# Patient Record
Sex: MALE | Race: WHITE | NOT HISPANIC OR LATINO | Employment: FULL TIME | URBAN - METROPOLITAN AREA
[De-identification: names, ages, dates, MRNs, and addresses within clinical notes are randomized per-mention and may not be internally consistent; named-entity substitution may affect disease eponyms.]

---

## 2017-01-24 ENCOUNTER — ALLSCRIPTS OFFICE VISIT (OUTPATIENT)
Dept: OTHER | Facility: OTHER | Age: 52
End: 2017-01-24

## 2017-03-21 ENCOUNTER — ALLSCRIPTS OFFICE VISIT (OUTPATIENT)
Dept: OTHER | Facility: OTHER | Age: 52
End: 2017-03-21

## 2017-06-27 ENCOUNTER — ALLSCRIPTS OFFICE VISIT (OUTPATIENT)
Dept: OTHER | Facility: OTHER | Age: 52
End: 2017-06-27

## 2018-01-05 DIAGNOSIS — E78.5 HYPERLIPIDEMIA: ICD-10-CM

## 2018-01-05 DIAGNOSIS — J45.909 UNCOMPLICATED ASTHMA: ICD-10-CM

## 2018-01-05 DIAGNOSIS — R03.0 ELEVATED BLOOD PRESSURE READING WITHOUT DIAGNOSIS OF HYPERTENSION: ICD-10-CM

## 2018-01-05 DIAGNOSIS — Z12.5 ENCOUNTER FOR SCREENING FOR MALIGNANT NEOPLASM OF PROSTATE: ICD-10-CM

## 2018-01-05 DIAGNOSIS — Z13.6 ENCOUNTER FOR SCREENING FOR CARDIOVASCULAR DISORDERS: ICD-10-CM

## 2018-01-10 NOTE — PROGRESS NOTES
Assessment   1  Encounter for preventive health examination (V70 0) (Z00 00)  2  Reactive airway disease (493 90) (J45 909)  3  Hyperlipidemia, mild (272 4) (E78 5)    Plan  Allergic rhinitis    · Continue: Nasacort Allergy 24HR 55 MCG/ACT Nasal Aerosol; two sprays in each nostril  q d  Health Maintenance, Hyperlipidemia, mild, Reactive airway disease    · Follow-up visit in 1 year Evaluation and Treatment  Follow-up  Status: Hold For -  Scheduling  Requested for: 24Jan2017  Reactive airway disease    · Renew: Symbicort 80-4 5 MCG/ACT Inhalation Aerosol; inhale 2 puffs twice daily  rinse  mouth after use   · Continue: Ventolin  (90 Base) MCG/ACT Inhalation Aerosol Solution; Inhale 2  puffs every 4-6 hours as needed for wheeze   · Follow-up visit in 6 weeks Evaluation and Treatment  Follow-up  Status: Hold For -  Scheduling  Requested for: 16XLG5679    Discussion/Summary  Prostate cancer screening: prostate cancer screening is current and prostate cancer screening is managed by Me  Testicular cancer screening: the risks and benefits of testicular cancer screening were discussed  Colorectal cancer screening: colorectal cancer screening is current, colonoscopy is needed every ten years, the next colonoscopy is due 2024 and colorectal cancer screening is managed by Elpidio Oreilly  The immunizations are up to date  He was advised to be evaluated by [facility] last summer  Advice and education were given regarding nutrition, aerobic exercise, weight bearing exercise and weight loss  Patient discussion: discussed with the patient  FEF 25-75 significant change post bronchodilator1        1 Amended By: Selene Yanez; Jan 24 2017 3:08 PM EST    Chief Complaint   1  Cold Symptoms  Patient is here today for his yearly HM but has some cold symptoms as well, he says they have been on and off for a month  History of Present Illness  HM, Adult Male: The patient is being seen for a health maintenance evaluation     General Health: The patient's health since the last visit is described as good  He has regular dental visits  He denies vision problems  He denies hearing loss  Immunizations status: up to date  Lifestyle:  He consumes a diverse and healthy diet  He does not have any weight concerns  He exercises regularly  He does not use tobacco  He denies alcohol use  He denies drug use  Screening: cancer screening reviewed and current  metabolic screening reviewed and current  risk screening reviewed and current  Asthma (Follow-Up): The patient is being seen for a routine clinic follow-up of asthma  The patient's long-term asthma pattern has been classified as mild persistent   The patient states he has been stable with his asthma control since the last visit  Asthma Control: Well controlled  Interval Symptoms:   Home Monitoring: The patient is not checking peak flow at home  Normal rescue inhaler use  Medications: the patient is not adherent with his medication regimen  He reports not understanding medication instructions and He had hoped that he could go off the symbicort  the patient complains of medication side effects  The patient complains of side effect from Mouth gets sore when using symbicort  Review of Systems    Constitutional: fever, feeling poorly and chills  Eyes: No complaints of eye pain, no red eyes, no discharge from eyes, no itchy eyes  ENT: no complaints of earache, no hearing loss, no nosebleeds, no nasal discharge, no sore throat, no hoarseness  Cardiovascular: No complaints of slow heart rate, no fast heart rate, no chest pain, no palpitations, no leg claudication, no lower extremity  Respiratory: shortness of breath and cough  Gastrointestinal: No complaints of abdominal pain, no constipation, no nausea or vomiting, no diarrhea or bloody stools  Genitourinary: No complaints of dysuria, no incontinence, no hesitancy, no nocturia, no genital lesion, no testicular pain     Musculoskeletal: No complaints of arthralgia, no myalgias, no joint swelling or stiffness, no limb pain or swelling  Integumentary: No complaints of skin rash or skin lesions, no itching, no skin wound, no dry skin  Neurological: No compliants of headache, no confusion, no convulsions, no numbness or tingling, no dizziness or fainting, no limb weakness, no difficulty walking  Active Problems   1  Abdominal pain (789 00) (R10 9)  2  Allergic rhinitis (477 9) (J30 9)  3  Bloating (787 3) (R14 0)  4  Borderline high blood pressure (796 2) (R03 0)  5  Colon cancer screening (V76 51) (Z12 11)  6  Constipation (564 00) (K59 00)  7  Diverticulosis (562 10) (K57 90)  8  Encounter for prostate cancer screening (V76 44) (Z12 5)  9  Encounter for screening for cardiovascular disorders (V81 2) (Z13 6)  10  Influenza vaccination given (V04 81) (Z23)  11  Lateral epicondylitis, unspecified laterality (726 32) (M77 10)  12  Reactive airway disease (493 90) (J45 909)  13  Shortness of breath (786 05) (R06 02)  14  Skin rash (782 1) (R21)  15  Subconjunctival hemorrhage of right eye (372 72) (H11 31)  16  Walking pneumonia (486) (J18 9)    Past Medical History    · History of Depression (311) (F32 9)    The past medical history was reviewed and updated today  Surgical History    · History of Appendectomy   · History of Surgery Vas Deferens Vasectomy    The surgical history was reviewed and updated today  Family History  Mother    · Family history of Anxiety Disorder NOS   · Family history of Family Health Status Of Mother - Alive  Father    · Family history of Cancer   · Family history of Family Health Status Of Father - Alive   · Family history of Hypertension (V17 49)  Family History    · Family history of Anxiety Disorder NOS   · Family history of Heart Disease (V17 49)   · Family history of Stroke Syndrome (V17 1)    The family history was reviewed and updated today         Social History    · Alcohol Use (History)   · Caffeine Use   · Never a smoker  The social history was reviewed and updated today  The social history was reviewed and is unchanged  Current Meds  1  Benadryl 25 MG TABS; Take one or two at bedtime; Therapy: 64Kna7832 to (Last Rx:28Apr2016) Ordered  2  Nasacort Allergy 24HR 55 MCG/ACT Nasal Aerosol; two sprays in each nostril q d; Therapy: 28Apr2016 to (Last Rx:28Apr2016)  Requested for: 15EME6015 Ordered  3  Symbicort 80-4 5 MCG/ACT Inhalation Aerosol; inhale 2 puffs twice daily  rinse mouth   after use; Therapy: 45NGW2753 to (Evaluate:92Fkv5778)  Requested for: 30SLJ3141; Last   Rx:64Qlj2615 Ordered  4  Ventolin  (90 Base) MCG/ACT Inhalation Aerosol Solution; Inhale 2 puffs every   4-6 hours as needed for wheeze; Therapy: 41UMM5666 to (Last Rx:04Mar2016)  Requested for: 71DKC3626 Ordered    Allergies   1  No Known Drug Allergies    Vitals   Recorded: 43XBL7467 12:53PM   Heart Rate 112   Respiration 18   Systolic 131   Diastolic 74   Weight 040 lb      Physical Exam    Constitutional   General appearance: No acute distress, well appearing and well nourished  Eyes   Conjunctiva and lids: No erythema, swelling or discharge  Pupils and irises: Equal, round, reactive to light  Ophthalmoscopic examination: Normal fundi and optic discs  Ears, Nose, Mouth, and Throat   Otoscopic examination: Tympanic membranes translucent with normal light reflex  Canals patent without erythema  Hearing: Normal     Nasal mucosa, septum, and turbinates: Normal without edema or erythema  Oropharynx: Normal with no erythema, edema, exudate or lesions  Neck   Thyroid: Normal, no thyromegaly  Pulmonary   Respiratory effort: No increased work of breathing or signs of respiratory distress  Auscultation of lungs: Clear to auscultation  Cardiovascular   Auscultation of heart: Normal rate and rhythm, normal S1 and S2, no murmurs         Results/Data  (1) CBC/PLT/DIFF 58BYC0643 08:24AM [facility] Number: [id number]     Test Name Result Flag Reference   WBC COUNT 15 77 Thousand/uL H 4 31-10 16   RBC COUNT 4 95 Million/uL  3 88-5 62   HEMOGLOBIN 16 0 g/dL  12 0-17 0   HEMATOCRIT 46 9 %  36 5-49 3   MCV 95 fL  82-98   MCH 32 3 pg  26 8-34 3   MCHC 34 1 g/dL  31 4-37 4   RDW 12 8 %  11 6-15 1   MPV 11 6 fL  8 9-12 7   PLATELET COUNT 010 Thousands/uL  149-390   nRBC AUTOMATED 0 /100 WBCs     NEUTROPHILS RELATIVE PERCENT 79 % H 43-75   LYMPHOCYTES RELATIVE PERCENT 11 % L 14-44   MONOCYTES RELATIVE PERCENT 8 %  4-12   EOSINOPHILS RELATIVE PERCENT 2 %  0-6   BASOPHILS RELATIVE PERCENT 0 %  0-1   NEUTROPHILS ABSOLUTE COUNT 12 33 Thousands/?L H 1 85-7 62   LYMPHOCYTES ABSOLUTE COUNT 1 80 Thousands/?L  0 60-4 47   MONOCYTES ABSOLUTE COUNT 1 20 Thousand/?L  0 17-1 22   EOSINOPHILS ABSOLUTE COUNT 0 37 Thousand/?L  0 00-0 61   BASOPHILS ABSOLUTE COUNT 0 03 Thousands/?L  0 00-0 10   - Patient Instructions: This bloodwork is non-fasting  Please drink two glasses of water morning of bloodwork  - Patient Instructions: This bloodwork is non-fasting  Please drink two glasses of water morning of bloodwork  (1) COMPREHENSIVE METABOLIC PANEL 25YKX5900 60:23OV Mayco Medina Order Number: [id number]     Test Name Result Flag Reference   GLUCOSE,RANDM 86 mg/dL     If the patient is fasting, the ADA then defines impaired fasting glucose as > 100 mg/dL and diabetes as > or equal to 123 mg/dL     SODIUM 139 mmol/L  136-145   POTASSIUM 4 3 mmol/L  3 5-5 3   CHLORIDE 103 mmol/L  100-108   CARBON DIOXIDE 30 mmol/L  21-32   ANION GAP (CALC) 6 mmol/L  4-13   BLOOD UREA NITROGEN 14 mg/dL  5-25   CREATININE 1 12 mg/dL  0 60-1 30   Standardized to IDMS reference method   CALCIUM 9 1 mg/dL  8 3-10 1   BILI, TOTAL 0 49 mg/dL  0 20-1 00   ALK PHOSPHATAS 99 U/L     ALT (SGPT) 32 U/L  12-78   AST(SGOT) 19 U/L  5-45   ALBUMIN 3 7 g/dL  3 5-5 0   TOTAL PROTEIN 7 9 g/dL  6 4-8 2   eGFR Non- >60 0 ml/min/1 73sq m   - Patient Instructions: This is a fasting blood test  Water, black tea or black coffee only after 9:00pm the night before test Drink 2 glasses of water the morning of test - Patient Instructions: This bloodwork is non-fasting  Please drink two glasses of   water morning of bloodwork  National Kidney Disease Education Program recommendations are as follows:  GFR calculation is accurate only with a steady state creatinine  Chronic Kidney disease less than 60 ml/min/1 73 sq  meters  Kidney failure less than 15 ml/min/1 73 sq  meters  (1) PSA (SCREEN) (Dx V76 44 Screen for Prostate Cancer) 56Zrv8299 08:24AM Justo Aguirre Order Number: [id number]     Test Name Result Flag Reference   PROSTATE SPECIFIC ANTIGEN 0 7 ng/mL  0 0-4 0   American Urological Association Guidelines define biochemical recurrence of prostate cancer as a detectable or rising PSA value post-radical prostatectomy that is greater than or equal to 0 2 ng/mL with a second confirmatory level of greater than or equal to 0 2 ng/mL  - Patient Instructions: This test is non-fasting  Please drink two glasses of water morning of bloodwork  - Patient Instructions: This test is non-fasting  Please drink two glasses of water morning of bloodwork  (1) TSH [id number] 08:24AM Justo Aguirre Order Number: [id number]     Test Name Result Flag Reference   TSH 1 470 uIU/mL  0 358-3 740   - Patient Instructions: This bloodwork is non-fasting  Please drink two glasses of water morning of bloodwork  - Patient Instructions: This is a fasting blood test  Water, black tea or black coffee only after 9:00pm the night before test Drink 2 glasses of water the morning of test - Patient Instructions: This bloodwork is non-fasting  Please drink two glasses of   water morning of bloodwork    Patients undergoing fluorescein dye angiography may retain small amounts of fluorescein in the body for 48-72 hours post procedure  Samples containing fluorescein can produce falsely depressed TSH values  If the patient had this procedure,a specimen should be resubmitted post fluorescein clearance  Health Management  Abdominal pain   COLONOSCOPY; every 5 years; Last 05Aug2014; Next Due: 05Aug2019; Active  Bloating   COLONOSCOPY; every 5 years; Last 05Aug2014; Next Due: 05Aug2019; Active  Constipation   COLONOSCOPY; every 5 years; Last 05Aug2014; Next Due: 05Aug2019;  Active    Signatures   Electronically signed by : SHIRLEY Hernandez ; Jan 24 2017  3:08PM EST                       (Author)

## 2018-01-12 VITALS
HEART RATE: 82 BPM | DIASTOLIC BLOOD PRESSURE: 84 MMHG | SYSTOLIC BLOOD PRESSURE: 136 MMHG | WEIGHT: 167.5 LBS | RESPIRATION RATE: 18 BRPM

## 2018-01-13 VITALS
RESPIRATION RATE: 18 BRPM | SYSTOLIC BLOOD PRESSURE: 122 MMHG | HEART RATE: 112 BPM | WEIGHT: 167 LBS | DIASTOLIC BLOOD PRESSURE: 74 MMHG

## 2018-01-14 VITALS
RESPIRATION RATE: 18 BRPM | HEART RATE: 88 BPM | DIASTOLIC BLOOD PRESSURE: 84 MMHG | SYSTOLIC BLOOD PRESSURE: 122 MMHG | WEIGHT: 165.38 LBS

## 2018-01-30 ENCOUNTER — OFFICE VISIT (OUTPATIENT)
Dept: FAMILY MEDICINE CLINIC | Facility: MEDICAL CENTER | Age: 53
End: 2018-01-30
Payer: COMMERCIAL

## 2018-01-30 VITALS
RESPIRATION RATE: 14 BRPM | HEART RATE: 84 BPM | SYSTOLIC BLOOD PRESSURE: 142 MMHG | WEIGHT: 166.38 LBS | BODY MASS INDEX: 23.82 KG/M2 | HEIGHT: 70 IN | DIASTOLIC BLOOD PRESSURE: 82 MMHG

## 2018-01-30 DIAGNOSIS — E78.00 PURE HYPERCHOLESTEROLEMIA: ICD-10-CM

## 2018-01-30 DIAGNOSIS — J45.20 MILD INTERMITTENT ASTHMA, UNSPECIFIED WHETHER COMPLICATED: ICD-10-CM

## 2018-01-30 DIAGNOSIS — Z00.00 HEALTH CARE MAINTENANCE: ICD-10-CM

## 2018-01-30 DIAGNOSIS — R03.0 BORDERLINE HIGH BLOOD PRESSURE: ICD-10-CM

## 2018-01-30 DIAGNOSIS — R07.9 CHEST PAIN, UNSPECIFIED TYPE: Primary | ICD-10-CM

## 2018-01-30 PROBLEM — E78.5 HYPERLIPIDEMIA, MILD: Status: ACTIVE | Noted: 2017-01-24

## 2018-01-30 PROCEDURE — 90732 PPSV23 VACC 2 YRS+ SUBQ/IM: CPT

## 2018-01-30 PROCEDURE — 90471 IMMUNIZATION ADMIN: CPT

## 2018-01-30 PROCEDURE — 99396 PREV VISIT EST AGE 40-64: CPT | Performed by: FAMILY MEDICINE

## 2018-01-30 PROCEDURE — 93000 ELECTROCARDIOGRAM COMPLETE: CPT | Performed by: FAMILY MEDICINE

## 2018-01-30 RX ORDER — DIPHENHYDRAMINE HCL 25 MG
TABLET ORAL
COMMUNITY
Start: 2016-04-28

## 2018-01-30 RX ORDER — TRIAMCINOLONE ACETONIDE 55 UG/1
SPRAY, METERED NASAL
COMMUNITY
Start: 2016-04-28

## 2018-01-30 RX ORDER — ALBUTEROL SULFATE 90 UG/1
2 AEROSOL, METERED RESPIRATORY (INHALATION)
COMMUNITY
Start: 2016-03-04 | End: 2020-02-05 | Stop reason: SDUPTHER

## 2018-01-30 NOTE — PROGRESS NOTES
Assessment/Plan:    No problem-specific Assessment & Plan notes found for this encounter  There are no diagnoses linked to this encounter  Subjective:      Patient ID: Maryjane Stringer is a 47 y o  male  Patient is here today for yearly health maintenance visit  Also to follow up on ongoing medical issues  He is 47years old no a in overall good health  He is an   He lives at home with his wife and three adult children  He has ongoing allergic rhinitis  He uses Nasacort daily and Benadryl p r n  He has noticed some problems with the Nasacort causing a bloody nose specially during the dry winter months  Otherwise there controlling his symptoms well  He has intermittent asthma  He does have several days of every month where he needs his Ventolin fairly regularly  We have tried  Inhaled steroids  However that did cause some problems with his hair falling out and he has been somewhat concerned about trying that again  He has also had borderline blood pressure  They run in the 140s over 80s  He has taken blood pressure readings at home  They are usually in the high 120s over about 80  He has had also borderline hyperlipidemia  He has had recent blood work  We are working to get those results on the chart now  he has had recent colonoscopy, when he was about 47years old  A repeat was recommended in five years  The following portions of the patient's history were reviewed and updated as appropriate:   He  has a past medical history of Depression  He  does not have any pertinent problems on file  He  has a past surgical history that includes Appendectomy and Vasectomy  His family history includes Anxiety disorder in his family and mother; Cancer in his father; Heart disease in his family; Hypertension in his father; Stroke in his family  He  reports that he has never smoked  He does not have any smokeless tobacco history on file   He reports that he drinks alcohol  His drug history is not on file  Current Outpatient Prescriptions   Medication Sig Dispense Refill    albuterol (VENTOLIN HFA) 90 mcg/act inhaler Inhale 2 puffs      diphenhydrAMINE (BENADRYL) 25 mg tablet Take by mouth      Triamcinolone Acetonide (NASACORT ALLERGY 24HR) 55 MCG/ACT AERO into each nostril       No current facility-administered medications for this visit  He is allergic to budesonide-formoterol fumarate       Review of Systems   Constitutional: Negative for activity change, fatigue and fever  HENT: Negative for congestion, ear discharge, ear pain, postnasal drip, rhinorrhea, sinus pain, sneezing and sore throat  Eyes: Negative for photophobia, pain, discharge and redness  Respiratory: Positive for wheezing (  Patient does get occasional wheezing, he does have asthma  He usually gets good relief with Ventolin  )  Negative for apnea, cough and shortness of breath  Cardiovascular: Positive for chest pain (HeHas occasional sharp chest pain it is just to the left of his upper sternal border  He is not sure that it is related any particular activity or stress, etc )  Negative for palpitations  Gastrointestinal: Negative for abdominal pain, blood in stool, constipation, diarrhea, nausea and vomiting  Endocrine: Negative for polydipsia, polyphagia and polyuria  Genitourinary: Negative for decreased urine volume, difficulty urinating, discharge, dysuria, frequency, penile pain and urgency  Musculoskeletal: Negative for arthralgias, gait problem, joint swelling and neck pain  Skin: Negative for color change and rash  Neurological: Negative for dizziness, tremors, seizures, weakness and headaches  Psychiatric/Behavioral: Negative for agitation and sleep disturbance  The patient is not nervous/anxious  Objective:     Physical Exam   Constitutional: He is oriented to person, place, and time  Vital signs are normal  He appears well-developed and well-nourished   He is cooperative  HENT:   Head: Normocephalic  Right Ear: External ear normal    Left Ear: External ear normal    Nose: Nose normal    Mouth/Throat: Oropharynx is clear and moist    Eyes: Conjunctivae, EOM and lids are normal  Pupils are equal, round, and reactive to light  Neck: Normal range of motion  Neck supple  Carotid bruit is not present  No thyromegaly present  Cardiovascular: Normal rate, regular rhythm, S1 normal, S2 normal, normal heart sounds, intact distal pulses and normal pulses  No murmur heard  Pulmonary/Chest: Effort normal and breath sounds normal  No respiratory distress  He has no wheezes  He has no rales  Abdominal: Soft  Normal appearance and bowel sounds are normal  He exhibits no mass  There is no hepatosplenomegaly  There is no tenderness  Musculoskeletal: Normal range of motion  Lymphadenopathy:     He has no cervical adenopathy  Neurological: He is alert and oriented to person, place, and time  He has normal strength and normal reflexes  No cranial nerve deficit or sensory deficit  Skin: Skin is warm, dry and intact  No rash noted  No pallor  Psychiatric: He has a normal mood and affect  His behavior is normal  Judgment and thought content normal  Cognition and memory are normal    Nursing note and vitals reviewed

## 2018-02-27 NOTE — PROGRESS NOTES
His blood work looks ok, except his cholesterol is high, high enough to consider meds     Or LF diet and exercise and recheck in 6 mos

## 2018-07-31 ENCOUNTER — OFFICE VISIT (OUTPATIENT)
Dept: FAMILY MEDICINE CLINIC | Facility: MEDICAL CENTER | Age: 53
End: 2018-07-31
Payer: COMMERCIAL

## 2018-07-31 VITALS
RESPIRATION RATE: 16 BRPM | WEIGHT: 167.2 LBS | HEART RATE: 68 BPM | DIASTOLIC BLOOD PRESSURE: 92 MMHG | BODY MASS INDEX: 23.99 KG/M2 | SYSTOLIC BLOOD PRESSURE: 132 MMHG

## 2018-07-31 DIAGNOSIS — E78.00 PURE HYPERCHOLESTEROLEMIA: ICD-10-CM

## 2018-07-31 DIAGNOSIS — I10 ESSENTIAL HYPERTENSION: Primary | ICD-10-CM

## 2018-07-31 PROCEDURE — 1036F TOBACCO NON-USER: CPT | Performed by: FAMILY MEDICINE

## 2018-07-31 PROCEDURE — 99213 OFFICE O/P EST LOW 20 MIN: CPT | Performed by: FAMILY MEDICINE

## 2018-07-31 RX ORDER — TRIAMTERENE AND HYDROCHLOROTHIAZIDE 37.5; 25 MG/1; MG/1
1 TABLET ORAL DAILY
Qty: 30 TABLET | Refills: 0 | Status: SHIPPED | OUTPATIENT
Start: 2018-07-31 | End: 2018-08-27 | Stop reason: SDUPTHER

## 2018-08-01 NOTE — ASSESSMENT & PLAN NOTE
We have seen a gradual increase in blood pressures with this patient  At this point I think it is reasonable to begin Dyazide  Will recheck his blood pressure in for six weeks  He does have a home blood pressure cuff and he will use it and bring and readings from it

## 2018-08-01 NOTE — PROGRESS NOTES
Assessment/Plan:    Essential hypertension  We have seen a gradual increase in blood pressures with this patient  At this point I think it is reasonable to begin Dyazide  Will recheck his blood pressure in for six weeks  He does have a home blood pressure cuff and he will use it and bring and readings from it  Pure hypercholesterolemia  Patient's last LDL was 160  He has been modifying his diet somewhat to try and limit saturated fats and increase fiber  Will continue with lifestyle changes and check another cholesterol in 4-6 months  Diagnoses and all orders for this visit:    Essential hypertension  -     triamterene-hydrochlorothiazide (MAXZIDE-25) 37 5-25 mg per tablet; Take 1 tablet by mouth daily  -     CBC and differential; Future    Pure hypercholesterolemia  -     CBC and differential; Future  -     Lipid Panel with Direct LDL reflex; Future          Subjective:      Patient ID: Adilene Sanchez is a 47 y o  male  Here for routine follow up of medical problems  Please see assesment and plan for discussion  The following portions of the patient's history were reviewed and updated as appropriate: allergies, current medications, past family history, past medical history, past social history, past surgical history and problem list     Review of Systems   Constitutional: Negative for activity change, fatigue and fever  HENT: Negative for congestion, ear discharge, ear pain, postnasal drip, rhinorrhea, sinus pain, sneezing and sore throat  Eyes: Negative for photophobia, pain, discharge and redness  Respiratory: Negative for apnea, cough, shortness of breath and wheezing  Cardiovascular: Negative for chest pain and palpitations  Gastrointestinal: Negative for abdominal pain, blood in stool, constipation, diarrhea, nausea and vomiting  Endocrine: Negative for polydipsia, polyphagia and polyuria     Genitourinary: Negative for decreased urine volume, difficulty urinating, discharge, dysuria, frequency, penile pain and urgency  Musculoskeletal: Negative for arthralgias, gait problem, joint swelling and neck pain  Skin: Negative for color change and rash  Neurological: Positive for headaches (He has been having some mild headaches, he is concerned it is related to his blood pressure  They do respond well to Tylenol or Advil )  Negative for dizziness, tremors, seizures and weakness  Psychiatric/Behavioral: Negative for agitation and sleep disturbance  The patient is not nervous/anxious  Objective:      /92   Pulse 68   Resp 16   Wt 75 8 kg (167 lb 3 2 oz)   BMI 23 99 kg/m²          Physical Exam   Constitutional: He is oriented to person, place, and time  Vital signs are normal  He appears well-developed and well-nourished  He is cooperative  HENT:   Head: Normocephalic  Right Ear: External ear normal    Left Ear: External ear normal    Nose: Nose normal    Mouth/Throat: Oropharynx is clear and moist    Eyes: Conjunctivae, EOM and lids are normal  Pupils are equal, round, and reactive to light  Neck: Normal range of motion  Neck supple  Carotid bruit is not present  No thyromegaly present  Cardiovascular: Normal rate, regular rhythm, S1 normal, S2 normal, normal heart sounds, intact distal pulses and normal pulses  No murmur heard  Pulmonary/Chest: Effort normal and breath sounds normal  No respiratory distress  He has no wheezes  He has no rales  Abdominal: Soft  Normal appearance and bowel sounds are normal  He exhibits no mass  There is no hepatosplenomegaly  There is no tenderness  Musculoskeletal: Normal range of motion  Lymphadenopathy:     He has no cervical adenopathy  Neurological: He is alert and oriented to person, place, and time  He has normal strength and normal reflexes  No cranial nerve deficit or sensory deficit  Skin: Skin is warm, dry and intact  No rash noted  No pallor     Psychiatric: He has a normal mood and affect  His behavior is normal  Judgment and thought content normal  Cognition and memory are normal    Nursing note and vitals reviewed

## 2018-08-01 NOTE — ASSESSMENT & PLAN NOTE
Patient's last LDL was 160  He has been modifying his diet somewhat to try and limit saturated fats and increase fiber  Will continue with lifestyle changes and check another cholesterol in 4-6 months

## 2018-08-27 DIAGNOSIS — I10 ESSENTIAL HYPERTENSION: ICD-10-CM

## 2018-08-28 RX ORDER — TRIAMTERENE AND HYDROCHLOROTHIAZIDE 37.5; 25 MG/1; MG/1
TABLET ORAL
Qty: 30 TABLET | Refills: 0 | Status: SHIPPED | OUTPATIENT
Start: 2018-08-28 | End: 2018-09-11 | Stop reason: SDUPTHER

## 2018-09-10 ENCOUNTER — TELEPHONE (OUTPATIENT)
Dept: FAMILY MEDICINE CLINIC | Facility: MEDICAL CENTER | Age: 53
End: 2018-09-10

## 2018-09-10 NOTE — TELEPHONE ENCOUNTER
Patient called our office asking if his appt for tomorrow is still needed since he went over his lab results with Dr Trinh Forget  Call back # [phone]  Routed to Dr Kinney to review and advise?

## 2018-09-11 ENCOUNTER — OFFICE VISIT (OUTPATIENT)
Dept: FAMILY MEDICINE CLINIC | Facility: MEDICAL CENTER | Age: 53
End: 2018-09-11
Payer: COMMERCIAL

## 2018-09-11 VITALS
SYSTOLIC BLOOD PRESSURE: 120 MMHG | HEART RATE: 73 BPM | OXYGEN SATURATION: 98 % | DIASTOLIC BLOOD PRESSURE: 78 MMHG | WEIGHT: 166 LBS | BODY MASS INDEX: 23.82 KG/M2

## 2018-09-11 DIAGNOSIS — Z13.29 SCREENING FOR THYROID DISORDER: ICD-10-CM

## 2018-09-11 DIAGNOSIS — Z12.5 SCREENING FOR PROSTATE CANCER: ICD-10-CM

## 2018-09-11 DIAGNOSIS — I10 ESSENTIAL HYPERTENSION: Primary | ICD-10-CM

## 2018-09-11 DIAGNOSIS — J45.20 MILD INTERMITTENT ASTHMA, UNSPECIFIED WHETHER COMPLICATED: ICD-10-CM

## 2018-09-11 DIAGNOSIS — E78.00 PURE HYPERCHOLESTEROLEMIA: ICD-10-CM

## 2018-09-11 PROCEDURE — 99213 OFFICE O/P EST LOW 20 MIN: CPT | Performed by: FAMILY MEDICINE

## 2018-09-11 PROCEDURE — 3074F SYST BP LT 130 MM HG: CPT | Performed by: FAMILY MEDICINE

## 2018-09-11 PROCEDURE — 3078F DIAST BP <80 MM HG: CPT | Performed by: FAMILY MEDICINE

## 2018-09-11 RX ORDER — TRIAMTERENE AND HYDROCHLOROTHIAZIDE 37.5; 25 MG/1; MG/1
1 TABLET ORAL DAILY
Qty: 90 TABLET | Refills: 3 | Status: SHIPPED | OUTPATIENT
Start: 2018-09-11 | End: 2019-08-30 | Stop reason: SDUPTHER

## 2018-09-12 NOTE — PROGRESS NOTES
Assessment/Plan:    Essential hypertension  Patient is here today for evaluation of hypertension  At last visit we started Dyazide  His blood pressure has responded very well to the Dyazide  Today it is 120/78  Continue Dyazide and we will recheck in several months  Diagnoses and all orders for this visit:    Essential hypertension  -     Comprehensive metabolic panel; Future  -     triamterene-hydrochlorothiazide (MAXZIDE-25) 37 5-25 mg per tablet; Take 1 tablet by mouth daily    Pure hypercholesterolemia  -     Lipid Panel with Direct LDL reflex; Future  -     Comprehensive metabolic panel; Future    Mild intermittent asthma, unspecified whether complicated  -     CBC and differential; Future    Screening for prostate cancer  -     PSA, Total Screen; Future    Screening for thyroid disorder  -     TSH, 3rd generation with Free T4 reflex; Future          Subjective:      Patient ID: Latanya Gleason is a 47 y o  male  Here for routine follow up of medical problems  Please see assesment and plan for discussion  The following portions of the patient's history were reviewed and updated as appropriate: allergies, current medications, past family history, past medical history, past social history, past surgical history and problem list     Review of Systems   Constitutional: Negative for activity change, fatigue and fever  HENT: Negative for congestion, ear discharge, ear pain, postnasal drip, rhinorrhea, sinus pain, sneezing and sore throat  Eyes: Negative for photophobia, pain, discharge and redness  Respiratory: Negative for apnea, cough, shortness of breath and wheezing  Cardiovascular: Negative for chest pain and palpitations  Gastrointestinal: Negative for abdominal pain, blood in stool, constipation, diarrhea, nausea and vomiting  Endocrine: Negative for polydipsia, polyphagia and polyuria     Genitourinary: Negative for decreased urine volume, difficulty urinating, discharge, dysuria, frequency, penile pain and urgency  Musculoskeletal: Negative for arthralgias, gait problem, joint swelling and neck pain  Skin: Negative for color change and rash  Neurological: Negative for dizziness, tremors, seizures, weakness and headaches  Psychiatric/Behavioral: Negative for agitation and sleep disturbance  The patient is not nervous/anxious  Objective:      /78 (BP Location: Left arm, Patient Position: Sitting, Cuff Size: Adult)   Pulse 73   Wt 75 3 kg (166 lb)   SpO2 98%   BMI 23 82 kg/m²          Physical Exam   Constitutional: He is oriented to person, place, and time  Vital signs are normal  He appears well-developed and well-nourished  He is cooperative  HENT:   Head: Normocephalic  Right Ear: External ear normal    Left Ear: External ear normal    Nose: Nose normal    Mouth/Throat: Oropharynx is clear and moist    Eyes: Conjunctivae, EOM and lids are normal  Pupils are equal, round, and reactive to light  Neck: Normal range of motion  Neck supple  Carotid bruit is not present  No thyromegaly present  Cardiovascular: Normal rate, regular rhythm, S1 normal, S2 normal, normal heart sounds, intact distal pulses and normal pulses  No murmur heard  Pulmonary/Chest: Effort normal and breath sounds normal  No respiratory distress  He has no wheezes  He has no rales  Abdominal: Soft  Normal appearance and bowel sounds are normal  He exhibits no mass  There is no hepatosplenomegaly  There is no tenderness  Musculoskeletal: Normal range of motion  Lymphadenopathy:     He has no cervical adenopathy  Neurological: He is alert and oriented to person, place, and time  He has normal strength and normal reflexes  No cranial nerve deficit or sensory deficit  Skin: Skin is warm, dry and intact  No rash noted  No pallor  Psychiatric: He has a normal mood and affect   His behavior is normal  Judgment and thought content normal  Cognition and memory are normal    Nursing note and vitals reviewed

## 2018-09-12 NOTE — ASSESSMENT & PLAN NOTE
Patient is here today for evaluation of hypertension  At last visit we started Dyazide  His blood pressure has responded very well to the Dyazide  Today it is 120/78  Continue Dyazide and we will recheck in several months

## 2018-09-29 DIAGNOSIS — I10 ESSENTIAL HYPERTENSION: ICD-10-CM

## 2018-10-01 RX ORDER — TRIAMTERENE AND HYDROCHLOROTHIAZIDE 37.5; 25 MG/1; MG/1
TABLET ORAL
Qty: 30 TABLET | Refills: 0 | OUTPATIENT
Start: 2018-10-01

## 2019-02-04 ENCOUNTER — OFFICE VISIT (OUTPATIENT)
Dept: FAMILY MEDICINE CLINIC | Facility: MEDICAL CENTER | Age: 54
End: 2019-02-04
Payer: COMMERCIAL

## 2019-02-04 VITALS
OXYGEN SATURATION: 97 % | BODY MASS INDEX: 24.62 KG/M2 | HEART RATE: 70 BPM | HEIGHT: 69 IN | SYSTOLIC BLOOD PRESSURE: 120 MMHG | WEIGHT: 166.2 LBS | DIASTOLIC BLOOD PRESSURE: 84 MMHG

## 2019-02-04 DIAGNOSIS — R20.0 NUMBNESS AND TINGLING IN LEFT HAND: ICD-10-CM

## 2019-02-04 DIAGNOSIS — E78.00 PURE HYPERCHOLESTEROLEMIA: ICD-10-CM

## 2019-02-04 DIAGNOSIS — J45.20 MILD INTERMITTENT ASTHMA, UNSPECIFIED WHETHER COMPLICATED: ICD-10-CM

## 2019-02-04 DIAGNOSIS — I10 ESSENTIAL HYPERTENSION: ICD-10-CM

## 2019-02-04 DIAGNOSIS — J30.1 ALLERGIC RHINITIS DUE TO POLLEN, UNSPECIFIED SEASONALITY: ICD-10-CM

## 2019-02-04 DIAGNOSIS — Z00.00 PREVENTATIVE HEALTH CARE: Primary | ICD-10-CM

## 2019-02-04 DIAGNOSIS — R20.2 NUMBNESS AND TINGLING IN LEFT HAND: ICD-10-CM

## 2019-02-04 PROCEDURE — 90471 IMMUNIZATION ADMIN: CPT

## 2019-02-04 PROCEDURE — 1036F TOBACCO NON-USER: CPT | Performed by: FAMILY MEDICINE

## 2019-02-04 PROCEDURE — 90670 PCV13 VACCINE IM: CPT

## 2019-02-04 PROCEDURE — 3008F BODY MASS INDEX DOCD: CPT | Performed by: FAMILY MEDICINE

## 2019-02-04 PROCEDURE — 99396 PREV VISIT EST AGE 40-64: CPT | Performed by: FAMILY MEDICINE

## 2019-02-04 PROCEDURE — 3079F DIAST BP 80-89 MM HG: CPT | Performed by: FAMILY MEDICINE

## 2019-02-04 PROCEDURE — 3074F SYST BP LT 130 MM HG: CPT | Performed by: FAMILY MEDICINE

## 2019-02-04 PROCEDURE — 99214 OFFICE O/P EST MOD 30 MIN: CPT | Performed by: FAMILY MEDICINE

## 2019-02-04 RX ORDER — ROSUVASTATIN CALCIUM 5 MG/1
5 TABLET, COATED ORAL DAILY
Qty: 30 TABLET | Refills: 5 | Status: SHIPPED | OUTPATIENT
Start: 2019-02-04 | End: 2019-02-21 | Stop reason: SDUPTHER

## 2019-02-05 NOTE — ASSESSMENT & PLAN NOTE
Patient continues to have good control of his blood pressure using Dyazide  Continue Dyazide, continue routine follow-up  Avoid salt and get exercise

## 2019-02-05 NOTE — ASSESSMENT & PLAN NOTE
Patient continues to run high LDL  His latest was 161  He does try to be dietary compliant  Years ago he tried red yeast rice  He stopped because of some joint pain    Will begin rosuvastatin 5 mg  Recheck lipid profile and liver enzymes in 6-8 weeks

## 2019-02-05 NOTE — ASSESSMENT & PLAN NOTE
Patient complains of ongoing tingling paresthesias and numbness in his left hand  It is in the median nerve distribution, however Phalen's and Tinel signs were negative  Patient is concerned that it might be related to some cervical radiculopathy years ago  Will check EMG

## 2019-02-21 DIAGNOSIS — E78.00 PURE HYPERCHOLESTEROLEMIA: ICD-10-CM

## 2019-02-22 RX ORDER — ROSUVASTATIN CALCIUM 5 MG/1
5 TABLET, COATED ORAL DAILY
Qty: 90 TABLET | Refills: 2 | Status: SHIPPED | OUTPATIENT
Start: 2019-02-22 | End: 2019-10-23 | Stop reason: ALTCHOICE

## 2019-03-14 ENCOUNTER — HOSPITAL ENCOUNTER (OUTPATIENT)
Dept: NEUROLOGY | Facility: CLINIC | Age: 54
Discharge: HOME | End: 2019-03-14
Payer: COMMERCIAL

## 2019-03-14 DIAGNOSIS — R20.2 NUMBNESS AND TINGLING IN LEFT HAND: ICD-10-CM

## 2019-03-14 DIAGNOSIS — R20.0 NUMBNESS AND TINGLING IN LEFT HAND: ICD-10-CM

## 2019-03-14 PROCEDURE — 95886 MUSC TEST DONE W/N TEST COMP: CPT | Performed by: PHYSICAL MEDICINE & REHABILITATION

## 2019-03-14 PROCEDURE — 95909 NRV CNDJ TST 5-6 STUDIES: CPT | Performed by: PHYSICAL MEDICINE & REHABILITATION

## 2019-03-14 NOTE — PROCEDURES
[facility]   [facility] , [address]   [phone]        Name: Javid Chapa  Patient ID: [id number]   Age: 46 Years 2 Months  YOB: 1965   Account Number:    Gender: Male   Technologist:    Date of Exam: 3/14/2019 08:01   Referring Physician: Anita Eller MD  Temperature: 31 9   Examining Physician: Alf Ramos MD  Height:                 Patient History:   46 y o male with chronic left shoulder/neck pain radiating to the shoulder blade, forearm numbness and tingling radiating to digits 1-3  Referred for CTS vs radiculopathy evaluation  5/5 bilateral UE throughout  negative roy         MNC      Nerve / Sites Muscle Latency Ref  Amplitude Ref  Duration Rel Amp Distance Lat Diff Velocity Ref  ms ms mV mV ms % mm ms m/s m/s   L Median - APB      Wrist APB 3 65 ?4 20 5 5 ?4 0 6 93 100 70         Elbow APB 7 76  6 2  7 34 114 235 4 11 57 ?50   L Ulnar - ADM      Wrist ADM 2 81 ?3 30 9 7 ?5 0 7 86 100 70         B  Elbow ADM 6 46  9 7  7 97 100 215 3 65 59 ?50      A  Elbow ADM 8 54  7 5  7 50 76 6 105 2 08 50 ?49            5 73         SNC      Nerve / Sites Rec  Site Onset Lat Peak Lat Ref  Amp Ref  Distance Peak Diff Ref  Velocity Ref  ms ms ms µV µV mm ms ms m/s m/s   L Median - Digit II (Antidromic)      Wrist Dig II 2 45 3 33 ?3 50 37 5 ?10 0 130   53 ?50   L Ulnar - Digit V (Antidromic)      Wrist Dig V 1 98 2 71 ?3 10 18 2 ?10 0 110   56 ?50   L Radial - Anatomical snuff box (Forearm)      Forearm Wrist 1 56 2 29 ?2 90 15 6 ?10 0 100   64 ?50   L Median, Ulnar - Transcarpal comparison      Median Palm Wrist 1 51 1 98 ?2 20 70 7 ?50 0 80   53       Ulnar Palm Wrist 1 46 2 14 ?2 20 8 8 ?12 0 80   55            -0 16 ?0 40         EMG         Needle EMG Examination     Insertional Spontaneous MUAP   Muscle Nerve Roots Activity Fib PSW Fasc Dur  Amp Poly Config Recruitment   L   Deltoid Axillary C5-C6 Normal None None None Normal Normal None Normal Normal   L  Biceps brachii Musculocutaneous C5-C6 Normal None None None Normal Normal None Normal Normal   L  Triceps brachii Radial C6-C8 Normal None None None 1+ 1+ None Normal Decr  L  Pronator teres Median C6-C7 Normal None None None Normal Normal None Normal Normal   L  First dorsal interosseous Ulnar C8-T1 Normal None None None Normal Normal None Normal Normal   L  Abductor pollicis brevis Median E8-M3 Normal None None None Normal Normal None Normal Normal   L  Trapezius Accessory (spinal) C3-C4 Normal None None None Normal Normal None Normal Normal   L  Rhomboid major Dorsal scapular C5- Normal None None None Normal Normal None Normal Normal   L  Extensor indicis proprius Radial C7-C8 Normal None None None Normal Normal None Normal Normal         Findings:   Motor:  Left median compound motor action potential (CMAP) demonstrated normal distal latency, normal amplitude, and normal conduction velocity across the forearm  Left ulnar compound motor action potential (CMAP) demonstrated normal distal latency, normal amplitude, and normal conduction velocity across the elbow and forearm  Sensory:  Left median sensory nerve action potential (SNAP) demonstrated normal amplitude and normal peak latency  Left ulnar sensory nerve action potential (SNAP) demonstrated normal amplitude and normal peak latency  Left radial sensory nerve action potential (SNAP) demonstrated normal amplitude and normal peak latency  Left median and ulnar sensory nerve action potential (SNAP) comparison study to the wrist (transcarpal) demonstrated normal latency difference  EMG:  A disposable monopolar needle was used to study selected muscles in the left upper extremity including the deltoid, biceps, triceps, pronator teres, first dorsal interosseous, extensor indicis proprius, rhomboid major, trapezius, and abductor pollicis brevis   The left triceps demonstrated decreased recruitment and abnormal MUAP morphology  All other muscles tested demonstrated normal insertional activity, no abnormal spontaneous activity, and normal volitional motor unit action potentials  Impression:   1  There is electrodiagnostic evidence of possible chronic left C7 radiculopathy, without ongoing denervation findings  2  There is no electrodiagnostic evidence of a left median, ulnar, or radial mononeuropathy  3  There is no electrodiagnostic evidence of a left brachial plexopathy           ___________________________  Feli Crenshaw MD

## 2019-04-09 ENCOUNTER — OFFICE VISIT (OUTPATIENT)
Dept: FAMILY MEDICINE CLINIC | Facility: MEDICAL CENTER | Age: 54
End: 2019-04-09
Payer: COMMERCIAL

## 2019-04-09 VITALS
DIASTOLIC BLOOD PRESSURE: 90 MMHG | BODY MASS INDEX: 25.1 KG/M2 | WEIGHT: 170 LBS | HEART RATE: 62 BPM | RESPIRATION RATE: 16 BRPM | SYSTOLIC BLOOD PRESSURE: 146 MMHG

## 2019-04-09 DIAGNOSIS — M54.12 CERVICAL RADICULOPATHY AT C7: Primary | ICD-10-CM

## 2019-04-09 DIAGNOSIS — S33.6XXA SACROILIAC (LIGAMENT) SPRAIN, INITIAL ENCOUNTER: ICD-10-CM

## 2019-04-09 PROCEDURE — 99213 OFFICE O/P EST LOW 20 MIN: CPT | Performed by: FAMILY MEDICINE

## 2019-04-09 RX ORDER — METHYLPREDNISOLONE 4 MG/1
TABLET ORAL
Qty: 21 EACH | Refills: 0 | Status: SHIPPED | OUTPATIENT
Start: 2019-04-09 | End: 2019-06-20 | Stop reason: ALTCHOICE

## 2019-04-26 ENCOUNTER — HOSPITAL ENCOUNTER (OUTPATIENT)
Dept: RADIOLOGY | Facility: HOSPITAL | Age: 54
Discharge: HOME | End: 2019-04-26
Payer: COMMERCIAL

## 2019-04-26 DIAGNOSIS — M54.12 CERVICAL RADICULOPATHY AT C7: ICD-10-CM

## 2019-04-26 PROCEDURE — 72141 MRI NECK SPINE W/O DYE: CPT

## 2019-06-04 ENCOUNTER — TELEPHONE (OUTPATIENT)
Dept: FAMILY MEDICINE CLINIC | Facility: MEDICAL CENTER | Age: 54
End: 2019-06-04

## 2019-06-07 DIAGNOSIS — M54.12 CERVICAL RADICULOPATHY AT C7: Primary | ICD-10-CM

## 2019-06-20 ENCOUNTER — OFFICE VISIT (OUTPATIENT)
Dept: NEUROSURGERY | Facility: CLINIC | Age: 54
End: 2019-06-20
Payer: COMMERCIAL

## 2019-06-20 VITALS
BODY MASS INDEX: 24.73 KG/M2 | HEART RATE: 69 BPM | WEIGHT: 167 LBS | TEMPERATURE: 98.2 F | DIASTOLIC BLOOD PRESSURE: 80 MMHG | HEIGHT: 69 IN | SYSTOLIC BLOOD PRESSURE: 123 MMHG

## 2019-06-20 DIAGNOSIS — M54.12 RADICULOPATHY, CERVICAL: Primary | ICD-10-CM

## 2019-06-20 DIAGNOSIS — M54.12 CERVICAL RADICULOPATHY AT C7: ICD-10-CM

## 2019-06-20 DIAGNOSIS — M54.2 CERVICALGIA: ICD-10-CM

## 2019-06-20 PROCEDURE — 99203 OFFICE O/P NEW LOW 30 MIN: CPT | Performed by: NEUROLOGICAL SURGERY

## 2019-06-20 RX ORDER — DIPHENOXYLATE HYDROCHLORIDE AND ATROPINE SULFATE 2.5; .025 MG/1; MG/1
1 TABLET ORAL DAILY
COMMUNITY

## 2019-07-10 ENCOUNTER — EVALUATION (OUTPATIENT)
Dept: PHYSICAL THERAPY | Facility: CLINIC | Age: 54
End: 2019-07-10
Payer: COMMERCIAL

## 2019-07-10 DIAGNOSIS — M54.12 RADICULOPATHY, CERVICAL: Primary | ICD-10-CM

## 2019-07-10 DIAGNOSIS — M54.2 CERVICALGIA: ICD-10-CM

## 2019-07-10 PROCEDURE — 97162 PT EVAL MOD COMPLEX 30 MIN: CPT | Performed by: PHYSICAL THERAPIST

## 2019-07-10 NOTE — PROGRESS NOTES
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  He had therapy then and pain had gone away and the tingling had remained  Then in the last 6 months the tingling and numbness increased  He felt decompression therapy was helpful  He does get HA for his whole life and dizziness in the last year  He thinks it may be related to BP  He had an EMG which he states had no significant findings and MRI which showed "Decreased disc height C5-C6 and C6-C7  Potentially significant left C3-6-7 foraminal stenosis, correlate for left C7 radiculitis  "       Neuro signs: tingling and numbness into L arm dorsal surface of arm into 1st 3 digits  Red flags: none  Occupation: - seated 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      Pain  At best pain ratin  At worst pain ratin  Location: L neck and upper trap  Quality: neck pain but numbness/discomfort constant  Relieving factors: sleeping, overhead elevation  Aggravating factors: sitting aggravates, worse at end of the day    Social Support  Lives with his wife and his son      Patient Goals  Patient goals for therapy: That his numbness will be gone- it has never fully went away  STGs  1  Decrease pain by 20% in 2-4 weeks  2  Improve cervical ROM by 10 degrees in 2-4 weeks  3  Improve L UE strength by 1/3 grade in 2-4 weeks  LTGs  1  Decrease pain by 60% in 6-8 weeks  2  Improve walking tolerance to >30 minutes in 6-8 weeks  3  Perform job activities without pain in 6-8 weeks          Objective Measurements:    Observation: L shoulder flex decreased scap control on descent  Sensation: L 1-3 digits more decreased sensation, 4-5 digit decreased sensation  Reflexes: WFL except C7 B 1+  Myotomes: WNL  Neck ROM:  Ext 30 improved  Flex 65 pain  R 72  L 83  DNF c OP caused some pulsing in arm  Alar lig:-  Transverse lig:-  Spurlings:-  Compression: -Distraction:-    Shoulder ROM: WFL  Shoulder strength: WNL        Mid trap: R 4/5  L 4-/5  Low trap: R 4/5 L 4-/5  DNF endurance: not tested      Assessment:    Sarthak Delgadillo is a pleasant 46 y o  male who presents with referring diagnosis  No further referral appears necessary at this time based upon examination results  The primary movement impairment diagnosis is cervical flexion hypomobility treated by traction classification resulting in pathoanatomical symptoms of L cervical radiculopathy and limiting his ability to perform activities s bothersome pain  Impairments include:  1) Decreased cervical flexion  2) Decreased scapular strength   3) Postural deficits     Etiologic factors include: insidious flare up of 6 months duration  Negative prognostic indicators:chroncity  Positive prognostic indicators: good motivation  Please contact me if you have any further questions or recommendations  Thank you very much for the kind referral         Plan  Patient would benefit from:Skilled physical therapy  Planned therapy interventions: mechanical traction, manual therapy, neuromuscular re-education, stretching, strengthening, therapeutic activities, therapeutic exercise, patient education, home exercise program, and activity modification  Frequency: 2x week  Duration in weeks: 6  Treatment plan discussed with: patient       Subjective: See IE      Objective: See treatment diary below      Assessment: Tolerated session well      Plan: Patient's main goal is to reduce numbness in arm    Precautions: HTN  Dx: Cervical flexion hypomobility- radiculopathy c traction classification      Daily Treatment Diary       Manuals 7/10                                                      Exercise Diary         Thoracic ext over chair 10x :05        Seated DNF         Prone ys face pillow         Prone ts- face pillow         UBE retro         Assess DNF endurance         Cervical extension 10x        LTR c UE         DNF c lift                                                                                                                     Modalities

## 2019-07-15 ENCOUNTER — OFFICE VISIT (OUTPATIENT)
Dept: PHYSICAL THERAPY | Facility: CLINIC | Age: 54
End: 2019-07-15
Payer: COMMERCIAL

## 2019-07-15 DIAGNOSIS — M54.2 CERVICALGIA: ICD-10-CM

## 2019-07-15 DIAGNOSIS — M54.12 RADICULOPATHY, CERVICAL: Primary | ICD-10-CM

## 2019-07-15 PROCEDURE — 97110 THERAPEUTIC EXERCISES: CPT

## 2019-07-15 PROCEDURE — 97012 MECHANICAL TRACTION THERAPY: CPT

## 2019-07-15 NOTE — PROGRESS NOTES
Daily Note     Today's date: 7/15/2019  Patient name: Nelson Ruiz  : 1965  MRN: [id number]  Referring provider: Bianca Segal PA-C  Dx:   Encounter Diagnosis     ICD-10-CM    1  Radiculopathy, cervical M54 12    2  Cervicalgia M54 2                   Subjective: Patient states that he has not been compliant with HEP but overall is having less pain and radiating parasthesia today  Objective: See treatment diary below      Assessment: Tolerated treatment fair  Patient exhibited good technique with therapeutic exercises  Trialed traction today  Check response at [facility]      Plan: Continue per plan of care        Plan: Patient's main goal is to reduce numbness in arm    Precautions: HTN  Dx: Cervical flexion hypomobility- radiculopathy c traction classification      Daily Treatment Diary       Manuals 7/10 7/15                                                     Exercise Diary         Thoracic ext over chair 10x :05 :05 x 10        Seated DNF  :05 x 10        Prone ys face pillow  X 10       Prone ts- face pillow  X 10        UBE retro  4 min        Assess DNF endurance         Cervical extension 10x 10x       LTR c UE         DNF c lift  pain                                                                                                                   Modalities         Traction- static  20# 4 step up/dpown

## 2019-07-17 ENCOUNTER — OFFICE VISIT (OUTPATIENT)
Dept: PHYSICAL THERAPY | Facility: CLINIC | Age: 54
End: 2019-07-17
Payer: COMMERCIAL

## 2019-07-17 DIAGNOSIS — M54.12 RADICULOPATHY, CERVICAL: Primary | ICD-10-CM

## 2019-07-17 DIAGNOSIS — M54.2 CERVICALGIA: ICD-10-CM

## 2019-07-17 PROCEDURE — 97110 THERAPEUTIC EXERCISES: CPT

## 2019-07-17 PROCEDURE — 97112 NEUROMUSCULAR REEDUCATION: CPT

## 2019-07-17 PROCEDURE — 97012 MECHANICAL TRACTION THERAPY: CPT

## 2019-07-17 NOTE — PROGRESS NOTES
Daily Note     Today's date: 2019  Patient name: Dayne Kawasaki  : 1965  MRN: [id number]  Referring provider: Tanmay Tran PA-C  Dx:   Encounter Diagnosis     ICD-10-CM    1  Radiculopathy, cervical M54 12    2  Cervicalgia M54 2        Start Time: 3164  Stop Time: 3779  Total time in clinic (min): 64 minutes    Subjective: Patient reports that he feels like PT is helping, but after last session he had to help his son with this car  Notes numbness and tingling that radiates from this neck to his fingers  Objective: See treatment diary below      Assessment: Tolerated treatment fair  No increase in radicular symptoms this session  Patient exhibited good technique with therapeutic exercises  Plan: Continue per plan of care        Plan: Patient's main goal is to reduce numbness in arm    Precautions: HTN  Dx: Cervical flexion hypomobility- radiculopathy c traction classification      Daily Treatment Diary       Manuals 7/10 7/15 07/17                                                    Exercise Diary         Thoracic ext over chair 10x :05 :05 x 10  :05 x10      Seated DNF  :05 x 10  2x10       Prone ys face pillow  X 10 2x10      Prone ts- face pillow  X 10  2x10      UBE retro  4 min  6 min       Assess DNF endurance         Cervical extension 10x 10x 10x       LTR c opp UE   10x      DNF c lift  pain nv                                                                                                                  Modalities         Traction- static  20# 4 step up/dpown 24#  4 steps up/down

## 2019-07-24 ENCOUNTER — OFFICE VISIT (OUTPATIENT)
Dept: PHYSICAL THERAPY | Facility: CLINIC | Age: 54
End: 2019-07-24
Payer: COMMERCIAL

## 2019-07-24 DIAGNOSIS — M54.12 RADICULOPATHY, CERVICAL: Primary | ICD-10-CM

## 2019-07-24 DIAGNOSIS — M54.2 CERVICALGIA: ICD-10-CM

## 2019-07-24 PROCEDURE — 97012 MECHANICAL TRACTION THERAPY: CPT

## 2019-07-24 PROCEDURE — 97110 THERAPEUTIC EXERCISES: CPT

## 2019-07-26 ENCOUNTER — OFFICE VISIT (OUTPATIENT)
Dept: PHYSICAL THERAPY | Facility: CLINIC | Age: 54
End: 2019-07-26
Payer: COMMERCIAL

## 2019-07-26 DIAGNOSIS — M54.2 CERVICALGIA: ICD-10-CM

## 2019-07-26 DIAGNOSIS — M54.12 RADICULOPATHY, CERVICAL: Primary | ICD-10-CM

## 2019-07-26 PROCEDURE — 97012 MECHANICAL TRACTION THERAPY: CPT | Performed by: PHYSICAL THERAPIST

## 2019-07-26 PROCEDURE — 97112 NEUROMUSCULAR REEDUCATION: CPT | Performed by: PHYSICAL THERAPIST

## 2019-07-26 PROCEDURE — 97110 THERAPEUTIC EXERCISES: CPT | Performed by: PHYSICAL THERAPIST

## 2019-07-26 NOTE — PROGRESS NOTES
Daily Note     Today's date: 2019  Patient name: Taylor Ontiveros  : 1965  MRN: [id number]  Referring provider: Adriano Interiano PA-C  Dx:   Encounter Diagnosis     ICD-10-CM    1  Radiculopathy, cervical M54 12    2  Cervicalgia M54 2                   Subjective: He states he has been feeling pretty good lately  He states the numbness has been the same but the pain is less  Objective: See treatment diary below      Assessment: Tolerated treatment well  Patient would benefit from continued PT  FOTO performed today and showed moderate improvement still has difficulty looking up and lifting boxes  Plan: Continue per plan of care        Plan: Patient's main goal is to reduce numbness in arm    Precautions: HTN  Dx: Cervical flexion hypomobility- radiculopathy c traction classification      Daily Treatment Diary       Manuals 7/10 7/15 07/17 7/24 7/26                                                  Exercise Diary         Thoracic ext over chair 10x :05 :05 x 10  :05 x10 :05 x 10  :05 x10    Seated DNF  :05 x 10  2x10  2 x 10  2x10    Prone ys face in table  X 10 2x10 2 x 10 2x10    Prone ts- face in table  X 10  2x10 2 x 10  2x10    UBE retro  4 min  6 min  6 min  6'    Assess DNF endurance         Cervical extension 10x 10x 10x  10x 10x    LTR c opp UE   10x X 10  x10    DNF c lift  pain nv  10x :05                                                                                                                Modalities         Traction- static  20# 4 step up/dpown 24#  4 steps up/down 25# 4 step up/down 10 min #25 3 step up/down 10'

## 2019-07-29 ENCOUNTER — OFFICE VISIT (OUTPATIENT)
Dept: PHYSICAL THERAPY | Facility: CLINIC | Age: 54
End: 2019-07-29
Payer: COMMERCIAL

## 2019-07-29 DIAGNOSIS — M54.12 RADICULOPATHY, CERVICAL: Primary | ICD-10-CM

## 2019-07-29 DIAGNOSIS — M54.2 CERVICALGIA: ICD-10-CM

## 2019-07-29 PROCEDURE — 97012 MECHANICAL TRACTION THERAPY: CPT | Performed by: PHYSICAL THERAPIST

## 2019-07-29 NOTE — PROGRESS NOTES
Daily Note     Today's date: 2019  Patient name: Emilia Martinez  : 1965  MRN: [id number]  Referring provider: Sherwin Smalls PA-C  Dx:   Encounter Diagnosis     ICD-10-CM    1  Radiculopathy, cervical M54 12    2  Cervicalgia M54 2                   Subjective:He states he is continuing to feel pretty good with tingling still in fingers  His pain in L cervical spine is dulling away  Objective: See treatment diary below      Assessment: Tolerated treatment well  Patient would benefit from continued PT  He tolerated exercises well will add in C-ext PROM nv      Plan: Continue per plan of care        Plan: Patient's main goal is to reduce numbness in arm    Precautions: HTN  Dx: Cervical flexion hypomobility- radiculopathy c traction classification      Daily Treatment Diary       Manuals 7/10 7/15 07/17 7/24 7/26 7/29   C ext prom nv c retraction                                              Exercise Diary         Thoracic ext over chair 10x :05 :05 x 10  :05 x10 :05 x 10  :05 x10 10x :05   Seated DNF  :05 x 10  2x10  2 x 10  2x10 2x10   Prone ys face in table  X 10 2x10 2 x 10 2x10 2x10   Prone ts- face in table  X 10  2x10 2 x 10  2x10 2x10   UBE retro  4 min  6 min  6 min  6' 6'   Assess DNF endurance         Cervical extension 10x 10x 10x  10x 10x 10x   LTR c opp UE   10x X 10  x10 10x :05   DNF c lift  pain nv  10x :05 10x :05   GTB row/LPD      2x10                                                                                                      Modalities         Traction- static  20# 4 step up/dpown 24#  4 steps up/down 25# 4 step up/down 10 min #25 3 step up/down 10' #25 2 step  11'

## 2019-07-31 ENCOUNTER — OFFICE VISIT (OUTPATIENT)
Dept: PHYSICAL THERAPY | Facility: CLINIC | Age: 54
End: 2019-07-31
Payer: COMMERCIAL

## 2019-07-31 DIAGNOSIS — M54.12 RADICULOPATHY, CERVICAL: Primary | ICD-10-CM

## 2019-07-31 DIAGNOSIS — M54.2 CERVICALGIA: ICD-10-CM

## 2019-07-31 PROCEDURE — 97012 MECHANICAL TRACTION THERAPY: CPT

## 2019-07-31 PROCEDURE — 97112 NEUROMUSCULAR REEDUCATION: CPT

## 2019-07-31 PROCEDURE — 97110 THERAPEUTIC EXERCISES: CPT

## 2019-07-31 NOTE — PROGRESS NOTES
Daily Note     Today's date: 2019  Patient name: Talha Kebede  : 1965  MRN: [id number]  Referring provider: Jian Fong PA-C  Dx:   Encounter Diagnosis     ICD-10-CM    1  Radiculopathy, cervical M54 12    2  Cervicalgia M54 2                   Subjective: Patient reports mild soreness from last visit  Denies any pain arriving to therapy today  Reports having tingling sensation in his left hand that appeared today, which diminished after a couple minutes  Objective: See treatment diary below      Assessment: Tolerated treatment well  Progressed patient through exercises, tolerated well with minimal fatigue  Introduced c ext PROM this visit, had increase sx's which diminished with continued PROM  Patient exhibited good technique with therapeutic exercises and would benefit from continued PT      Plan: Continue per plan of care  Progress treatment as tolerated         Plan: Patient's main goal is to reduce numbness in arm    Precautions: HTN  Dx: Cervical flexion hypomobility- radiculopathy c traction classification      Daily Treatment Diary       Manuals 7/31 7/15 07/17 7/24 7/26 7/29   C ext prom nv c retraction 5'                                             Exercise Diary         Thoracic ext over chair :05 x10  :05 x 10  :05 x10 :05 x 10  :05 x10 10x :05   Seated DNF 2x 12 :05 x 10  2x10  2 x 10  2x10 2x10   Prone ys face in table 2x10 X 10 2x10 2 x 10 2x10 2x10   Prone ts- face in table 2x10 X 10  2x10 2 x 10  2x10 2x10   UBE retro 6 min 4 min  6 min  6 min  6' 6'   Assess DNF endurance         Cervical extension 12x 10x 10x  10x 10x 10x   LTR c opp UE x10  10x X 10  x10 10x :05   DNF c lift 10x:05 pain nv  10x :05 10x :05   GTB row/LPD 2x10     2x10                                                                                                      Modalities         Traction- static #25  2 step  11' 20# 4 step up/dpown 24#  4 steps up/down 25# 4 step up/down 10 min #25 3 step up/down 10' #25 2 step  11'

## 2019-08-05 ENCOUNTER — OFFICE VISIT (OUTPATIENT)
Dept: PHYSICAL THERAPY | Facility: CLINIC | Age: 54
End: 2019-08-05
Payer: COMMERCIAL

## 2019-08-05 DIAGNOSIS — M54.2 CERVICALGIA: ICD-10-CM

## 2019-08-05 DIAGNOSIS — M54.12 RADICULOPATHY, CERVICAL: Primary | ICD-10-CM

## 2019-08-05 PROCEDURE — 97110 THERAPEUTIC EXERCISES: CPT | Performed by: PHYSICAL THERAPIST

## 2019-08-05 PROCEDURE — 97012 MECHANICAL TRACTION THERAPY: CPT | Performed by: PHYSICAL THERAPIST

## 2019-08-05 PROCEDURE — 97112 NEUROMUSCULAR REEDUCATION: CPT | Performed by: PHYSICAL THERAPIST

## 2019-08-05 NOTE — PROGRESS NOTES
Daily Note     Today's date: 2019  Patient name: Adilene Sanchez  : 1965  MRN: [id number]  Referring provider: Lennox Foyer, PA-C  Dx:   Encounter Diagnosis     ICD-10-CM    1  Radiculopathy, cervical M54 12    2  Cervicalgia M54 2                   Subjective: Patient reports he hardly has pain at night  However he still does have numbness in hands  Objective: See treatment diary below      Assessment: Tolerated treatment well  He still had decreased C-extension ROM  Distraction slightly relieved pain c ROM  Plan: Continue per plan of care  Progress treatment as tolerated         Plan: Patient's main goal is to reduce numbness in arm    Precautions: HTN  Dx: Cervical flexion hypomobility- radiculopathy c traction classification      Daily Treatment Diary       Manuals    C ext prom nv c retraction 5' 8'                                            Exercise Diary         Thoracic ext over chair :05 x10  :05 x 10  :05 x10 :05 x 10  :05 x10 10x :05            Prone ys face in table 2x10 2X 10 towel this visit 2x10 2 x 10 2x10 2x10   Prone ts- face in table 2x10 2X 10 towel this visit 2x10 2 x 10  2x10 2x10   UBE retro 6 min 6 min  6 min  6 min  6' 6'   RTB wall walks  7x       Cervical extension 12x 10x 10x  10x 10x 10x   LTR c opp UE x10 10x 10x X 10  x10 10x :05   DNF c lift 10x:05 10x :05 nv  10x :05 10x :05   GTB row/LPD 2x10 2x10    2x10                                                                                                      Modalities         Traction- static #25  2 step  11' 25# 2 step up/dpown 24#  4 steps up/down 25# 4 step up/down 10 min #25 3 step up/down 10' #25 2 step  11'

## 2019-08-07 ENCOUNTER — APPOINTMENT (OUTPATIENT)
Dept: PHYSICAL THERAPY | Facility: CLINIC | Age: 54
End: 2019-08-07
Payer: COMMERCIAL

## 2019-08-12 ENCOUNTER — OFFICE VISIT (OUTPATIENT)
Dept: PHYSICAL THERAPY | Facility: CLINIC | Age: 54
End: 2019-08-12
Payer: COMMERCIAL

## 2019-08-12 DIAGNOSIS — M54.2 CERVICALGIA: ICD-10-CM

## 2019-08-12 DIAGNOSIS — M54.12 RADICULOPATHY, CERVICAL: Primary | ICD-10-CM

## 2019-08-12 PROCEDURE — 97110 THERAPEUTIC EXERCISES: CPT | Performed by: PHYSICAL THERAPIST

## 2019-08-12 PROCEDURE — 97112 NEUROMUSCULAR REEDUCATION: CPT | Performed by: PHYSICAL THERAPIST

## 2019-08-12 PROCEDURE — 97012 MECHANICAL TRACTION THERAPY: CPT | Performed by: PHYSICAL THERAPIST

## 2019-08-12 PROCEDURE — 97140 MANUAL THERAPY 1/> REGIONS: CPT | Performed by: PHYSICAL THERAPIST

## 2019-08-14 ENCOUNTER — OFFICE VISIT (OUTPATIENT)
Dept: PHYSICAL THERAPY | Facility: CLINIC | Age: 54
End: 2019-08-14
Payer: COMMERCIAL

## 2019-08-14 DIAGNOSIS — M54.12 RADICULOPATHY, CERVICAL: Primary | ICD-10-CM

## 2019-08-14 DIAGNOSIS — M54.2 CERVICALGIA: ICD-10-CM

## 2019-08-14 PROCEDURE — 97140 MANUAL THERAPY 1/> REGIONS: CPT

## 2019-08-14 PROCEDURE — 97012 MECHANICAL TRACTION THERAPY: CPT

## 2019-08-14 PROCEDURE — 97112 NEUROMUSCULAR REEDUCATION: CPT

## 2019-08-14 PROCEDURE — 97110 THERAPEUTIC EXERCISES: CPT

## 2019-08-14 NOTE — PROGRESS NOTES
Daily Note     Today's date: 2019  Patient name: Long Baez  : 1965  MRN: [id number]  Referring provider: Raulito Castillo PA-C  Dx:   Encounter Diagnosis     ICD-10-CM    1  Radiculopathy, cervical M54 12    2  Cervicalgia M54 2        Start Time: 1700  Stop Time: 1800  Total time in clinic (min): 60 minutes    Subjective: Patient notes a headache during last traction session  He notes that pain followed for a few days but today it's diminished  I asked Brenna Romero, PT if traction was appropriate for today  He states, "Yes, but if the symptoms come on lets stop traction"  Objective: See treatment diary below    Assessment: No adverse symptoms t/o the entire treatment session  Patient required min verbal cueing for proper form  He seems to be responding well to the current PT POC  Mild LUE numbness relief  Plan: Continue per plan of care        Plan: Patient's main goal is to reduce numbness in arm    Precautions: HTN  Dx: Cervical flexion hypomobility- radiculopathy c traction classification    Daily Treatment Diary     Manuals    C ext prom nv c retraction 5' 8' 8' 8'                                          Exercise Diary         Thoracic ext over chair :05 x10  :05 x 10  10x5" 10x5" :05 x10 10x :05            Prone ys face in table 2x10 2X 10 towel this visit 2x10 2x10 2x10 2x10   Prone ts- face in table 2x10 2X 10 towel this visit 2x10 2x10 2x10 2x10   UBE retro 6 min 6 min  6 min 6 min 6' 6'   RTB wall walks  7x       Cervical extension 12x 10x 10x 10x 10x 10x   LTR c opp UE x10 10x 10x 10x :05 x10 10x :05   DNF c lift 10x:05 10x :05 10x5" 10x5" 10x :05 10x :05   GTB row/LPD 2x10 2x10 BTB 2x10 BTB   2x10  2x10                                                                                                      Modalities         Traction- static #25  2 step  11' 25# 2 step up/dpown  25#  2 step up/down #25 3 step up/down 10' #25 2 step  11'

## 2019-08-15 NOTE — PROGRESS NOTES
Assessment/Plan:    Numbness and tingling in left hand  Patient complains of ongoing tingling paresthesias and numbness in his left hand  It is in the median nerve distribution, however Phalen's and Tinel signs were negative  Patient is concerned that it might be related to some cervical radiculopathy years ago  Will check EMG  Pure hypercholesterolemia  Patient continues to run high LDL  His latest was 161  He does try to be dietary compliant  Years ago he tried red yeast rice  He stopped because of some joint pain    Will begin rosuvastatin 5 mg  Recheck lipid profile and liver enzymes in 6-8 weeks  Essential hypertension  Patient continues to have good control of his blood pressure using Dyazide  Continue Dyazide, continue routine follow-up  Avoid salt and get exercise  Diagnoses and all orders for this visit:    Preventative health care    Numbness and tingling in left hand  -     EMG 1 Limb; Future    Mild intermittent asthma, unspecified whether complicated  -     PNEUMOCOCCAL CONJUGATE VACCINE 13-VALENT GREATER THAN 6 MONTHS    Allergic rhinitis due to pollen, unspecified seasonality    Essential hypertension    Pure hypercholesterolemia  -     rosuvastatin (CRESTOR) 5 mg tablet; Take 1 tablet (5 mg total) by mouth daily  -     Lipid Panel with Direct LDL reflex; Future  -     ALT; Future  -     AST; Future          Subjective:      Patient ID: Steve Chavez is a 46 y o  male  Here for routine follow up of medical problems  Please see assesment and plan for discussion  Patient is also here for annual preventative maintenance visit  He is  he has two adult children one of whom lives with him  He works as an   He does not smoke or abuse alcohol  He is up-to-date with colonoscopy, he will be due this fall  His PSA testing is up-to-date also      Past medical history, past surgical history, family medical history, social history, and medication list were all reviewed  Hypertension   Pertinent negatives include no chest pain, headaches, neck pain, palpitations or shortness of breath  The following portions of the patient's history were reviewed and updated as appropriate: allergies, current medications, past family history, past medical history, past social history, past surgical history and problem list     Review of Systems   Constitutional: Negative for activity change, fatigue and fever  HENT: Negative for congestion, ear discharge, ear pain, postnasal drip, rhinorrhea, sinus pain, sneezing and sore throat  Eyes: Negative for photophobia, pain, discharge and redness  Respiratory: Negative for apnea, cough, shortness of breath and wheezing  Cardiovascular: Negative for chest pain and palpitations  Gastrointestinal: Negative for abdominal pain, blood in stool, constipation, diarrhea, nausea and vomiting  Endocrine: Negative for polydipsia, polyphagia and polyuria  Genitourinary: Negative for decreased urine volume, difficulty urinating, discharge, dysuria, frequency, penile pain and urgency  Musculoskeletal: Negative for arthralgias, gait problem, joint swelling and neck pain  Skin: Negative for color change and rash  Neurological: Negative for dizziness, tremors, seizures, weakness and headaches  Psychiatric/Behavioral: Negative for agitation and sleep disturbance  The patient is not nervous/anxious  Objective:      /84   Pulse 70   Ht 5' 9" (1 753 m)   Wt 75 4 kg (166 lb 3 2 oz)   SpO2 97%   BMI 24 54 kg/m²          Physical Exam   Constitutional: He is oriented to person, place, and time  Vital signs are normal  He appears well-developed and well-nourished  He is cooperative  HENT:   Head: Normocephalic  Right Ear: External ear normal    Left Ear: External ear normal    Nose: Nose normal    Mouth/Throat: Oropharynx is clear and moist    Eyes: Pupils are equal, round, and reactive to light  Conjunctivae, EOM and lids are normal    Neck: Normal range of motion  Neck supple  Carotid bruit is not present  No thyromegaly present  Cardiovascular: Normal rate, regular rhythm, S1 normal, S2 normal, normal heart sounds, intact distal pulses and normal pulses  No murmur heard  Pulmonary/Chest: Effort normal and breath sounds normal  No respiratory distress  He has no wheezes  He has no rales  Abdominal: Soft  Normal appearance and bowel sounds are normal  He exhibits no mass  There is no hepatosplenomegaly  There is no tenderness  Musculoskeletal: Normal range of motion  Lymphadenopathy:     He has no cervical adenopathy  Neurological: He is alert and oriented to person, place, and time  He has normal strength and normal reflexes  No cranial nerve deficit or sensory deficit  Skin: Skin is warm, dry and intact  No rash noted  No pallor  Psychiatric: He has a normal mood and affect  His behavior is normal  Judgment and thought content normal  Cognition and memory are normal    Nursing note and vitals reviewed  Parent/Patient

## 2019-08-19 ENCOUNTER — EVALUATION (OUTPATIENT)
Dept: PHYSICAL THERAPY | Facility: CLINIC | Age: 54
End: 2019-08-19
Payer: COMMERCIAL

## 2019-08-19 DIAGNOSIS — M54.2 CERVICALGIA: ICD-10-CM

## 2019-08-19 DIAGNOSIS — M54.12 RADICULOPATHY, CERVICAL: Primary | ICD-10-CM

## 2019-08-19 PROCEDURE — 97012 MECHANICAL TRACTION THERAPY: CPT

## 2019-08-19 NOTE — PROGRESS NOTES
Daily Note     Today's date: 2019  Patient name: Gustavo Gutiérrez  : 1965  MRN: [id number]  Referring provider: Wil Simon PA-C  Dx:   Encounter Diagnosis     ICD-10-CM    1  Radiculopathy, cervical M54 12    2  Cervicalgia M54 2                   Subjective: Refer to RE      Objective: See treatment diary below      Assessment: Tolerated treatment fair  Patient would benefit from continued PT      Plan: Continue per plan of care        Precautions: HTN  Dx: Cervical flexion hypomobility- radiculopathy c traction classification    Daily Treatment Diary     Manuals  8    C ext prom nv c retraction 5' 8' 8' 8' 5                                         Exercise Diary         Thoracic ext over chair :05 x10  :05 x 10  10x5" 10x5" 5"   x 10              Prone ys face in table 2x10 2X 10 towel this visit 2x10 2x10 2 x 10     Prone ts- face in table 2x10 2X 10 towel this visit 2x10 2x10 2 x 10     UBE retro 6 min 6 min  6 min 6 min 6 min     RTB wall walks  7x       Cervical extension 12x 10x 10x 10x 10    LTR c opp UE x10 10x 10x 10x :05 :05x10    DNF c lift 10x:05 10x :05 10x5" 10x5" 5"x 10     GTB row/LPD 2x10 2x10 BTB 2x10 BTB   2x10 BTB 2 x 10                                                                                                        Modalities         Traction- static #25  2 step  11' 25# 2 step up/dpown  25#  2 step up/down 25# 2 step up/down

## 2019-08-19 NOTE — PROGRESS NOTES
PT Re- Evaluation    Today's date: 2019  Patient name: Leonela Bright  : 1965  MRN: [id number]  Referring provider: Maria De Jesus Siegel PA-C  Dx:   Encounter Diagnosis     ICD-10-CM    1  Radiculopathy, cervical M54 12    2  Cervicalgia M54 2            Subjective Evaluation     History of Present Illness    Patient states he feels about 50% better  He states he does get soreness  He states he has achiness in his neck with working at home  He still has tingling into fingers  He does get a bit of HA         Neuro signs: tingling and numbness into L arm dorsal surface of arm into 1st 3 digits  Red flags: none  Occupation: - seated 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      Pain  At best pain ratin  At worst pain ratin  Location: L neck and upper trap  Quality: neck pain but numbness/discomfort constant  Relieving factors: sleeping, overhead elevation  Aggravating factors: sitting aggravates, worse at end of the day,     Social Support  Lives with his wife and his son      Patient Goals  Patient goals for therapy: That his numbness will be gone- it has never fully went away  STGs  1  Decrease pain by 20% in 2-4 weeks  - not met  2  Improve cervical ROM by 10 degrees in 2-4 weeks  -met  3  Improve L UE strength by 1/3 grade in 2-4 weeks  - met      LTGs  1  Decrease pain by 60% in 6-8 weeks  2  Improve working overhead tolerance to >30 minutes in 6-8 weeks  3  Perform job activities without pain in 6-8 weeks  Objective Measurements:    Observation: L shoulder flex decreased scap control on descent  Sensation: L 1-3 digits more decreased sensation, 4-5 digit decreased sensation  Reflexes: WFL except C7 L 1+  Myotomes: WNL  Neck ROM:  Ext 48  Flex 60   R 79  L 71  DNF c OP caused some pulsing in arm    Spurlings:-  Compression: -Distraction:- (NT)    Shoulder ROM: WFL  Shoulder strength:  WNL        Mid trap: R 4+/5  L 4/5  Low trap: R 4+/5 L 4/5  DNF endurance: 28 18 sec c mild pain      Assessment:    Dayne Kawasaki has demonstrated overall improvement in cervical ROM, strength, and function  Currently, he has made steady progress towards his goals, but continues to remain limited with scapular strength, full cervical ROM for lifting and overhead activity  At this time, skilled physical therapy is warranted to address the remaining impairments and aide in return to independent HEP  He demonstrates good motivation and compliance c HEP  Plan  Patient would benefit from:Skilled physical therapy  Planned therapy interventions: mechanical traction, manual therapy, neuromuscular re-education, stretching, strengthening, therapeutic activities, therapeutic exercise, patient education, home exercise program, and activity modification      Frequency: 2x week  Duration in weeks:4-5  Treatment plan discussed with: patient

## 2019-08-21 ENCOUNTER — OFFICE VISIT (OUTPATIENT)
Dept: PHYSICAL THERAPY | Facility: CLINIC | Age: 54
End: 2019-08-21
Payer: COMMERCIAL

## 2019-08-21 DIAGNOSIS — M54.2 CERVICALGIA: ICD-10-CM

## 2019-08-21 DIAGNOSIS — M54.12 RADICULOPATHY, CERVICAL: Primary | ICD-10-CM

## 2019-08-21 PROCEDURE — 97012 MECHANICAL TRACTION THERAPY: CPT

## 2019-08-21 PROCEDURE — 97112 NEUROMUSCULAR REEDUCATION: CPT

## 2019-08-21 PROCEDURE — 97110 THERAPEUTIC EXERCISES: CPT

## 2019-08-21 NOTE — PROGRESS NOTES
Daily Note     Today's date: 2019  Patient name: Jennifer Mancuso  : 1965  MRN: [id number]  Referring provider: Mary Lay PA-C  Dx:   Encounter Diagnosis     ICD-10-CM    1  Radiculopathy, cervical M54 12    2  Cervicalgia M54 2        Start Time: 1233          Subjective: Pt reports feeling "a little sore" in cervical spine prior to start of session with numbness in L hand/lower UE  "The numbness is always there "  Notes he has been a little more active the past week, doing minor demolition at his house, which he thinks may be the cause for this increase of pain  Pt notes discomfort in c/s experienced prior to starting felt better post session  Objective: See treatment diary below      Assessment: Tolerated treatment well  Slight pain in L c/s noted during manuals, likely from slight guarding/assistance from pt  Pain resolved once pt relaxed  Continues to respond well to mechanical traction, with slight decrease of symptoms noted from pt  Patient would benefit from continued PT to further improve function with reduced frequency of symptoms  Plan: Continue per plan of care        Precautions: HTN  Dx: Cervical flexion hypomobility- radiculopathy c traction classification    Daily Treatment Diary     Manuals  8   C ext prom nv c retraction 5' 8' 8' 8' 5 5'                                        Exercise Diary         Thoracic ext over chair :05 x10  :05 x 10  10x5" 10x5" 5"   x 10  5"x10             Prone ys face in table 2x10 2X 10 towel this visit 2x10 2x10 2 x 10  2x10   Prone ts- face in table 2x10 2X 10 towel this visit 2x10 2x10 2 x 10  2x10   UBE retro 6 min 6 min  6 min 6 min 6 min  6 min   RTB wall walks  7x       Cervical extension 12x 10x 10x 10x 10 x10   LTR c opp UE x10 10x 10x 10x :05 :05x10 5"x10   DNF c lift 10x:05 10x :05 10x5" 10x5" 5"x 10  5"x10   GTB row/LPD 2x10 2x10 BTB 2x10 BTB   2x10 BTB 2 x 10  BTB   2 x 10 Modalities         Traction- static #25  2 step  11' 25# 2 step up/dpown  25#  2 step up/down 25# 2 step up/down 25#   2 step up/down  11'

## 2019-08-26 ENCOUNTER — OFFICE VISIT (OUTPATIENT)
Dept: PHYSICAL THERAPY | Facility: CLINIC | Age: 54
End: 2019-08-26
Payer: COMMERCIAL

## 2019-08-26 DIAGNOSIS — M54.12 RADICULOPATHY, CERVICAL: Primary | ICD-10-CM

## 2019-08-26 DIAGNOSIS — M54.2 CERVICALGIA: ICD-10-CM

## 2019-08-26 PROCEDURE — 97112 NEUROMUSCULAR REEDUCATION: CPT | Performed by: PHYSICAL THERAPIST

## 2019-08-26 PROCEDURE — 97012 MECHANICAL TRACTION THERAPY: CPT | Performed by: PHYSICAL THERAPIST

## 2019-08-26 PROCEDURE — 97140 MANUAL THERAPY 1/> REGIONS: CPT | Performed by: PHYSICAL THERAPIST

## 2019-08-26 PROCEDURE — 97110 THERAPEUTIC EXERCISES: CPT | Performed by: PHYSICAL THERAPIST

## 2019-08-26 NOTE — PROGRESS NOTES
Daily Note     Today's date: 2019  Patient name: Augustus Arias  : 1965  MRN: [id number]  Referring provider: Luis Mckeon PA-C  Dx:   Encounter Diagnosis     ICD-10-CM    1  Radiculopathy, cervical M54 12    2  Cervicalgia M54 2                   Subjective: Pt reports he is feeling pretty good today but his tingling is about the same as is expected  Objective: See treatment diary below    Assessment: Tolerated treatment well  PROM WFL today  Plan: Continue per plan of care        Precautions: HTN  Dx: Cervical flexion hypomobility- radiculopathy c traction classification    Daily Treatment Diary     Manuals  8   C ext prom nv c retraction 8' 8' 8' 8' 5 5'                                        Exercise Diary         Thoracic ext over chair :05 x10  :05 x 10  10x5" 10x5" 5"   x 10  5"x10             Prone ys towel for face 2x10 2X 10 towel this visit 2x10 2x10 2 x 10  2x10   Prone ts- towel for face 2x10 2X 10 towel this visit 2x10 2x10 2 x 10  2x10   UBE fwd/back 3/3 min 6 min  6 min 6 min 6 min  6 min   RTB wall walks 7x 7x       Cervical extension 10x 10x 10x 10x 10 x10   LTR c opp UE x10 10x 10x 10x :05 :05x10 5"x10   DNF c lift 10x:05 10x :05 10x5" 10x5" 5"x 10  5"x10   BTB row/LPD 2x10 2x10 BTB 2x10 BTB   2x10 BTB 2 x 10  BTB   2 x 10                                                                                                       Modalities         Traction- static #25  2 step  11' 25# 2 step up/dpown  25#  2 step up/down 25# 2 step up/down 25#   2 step up/down  11'

## 2019-08-28 ENCOUNTER — OFFICE VISIT (OUTPATIENT)
Dept: PHYSICAL THERAPY | Facility: CLINIC | Age: 54
End: 2019-08-28
Payer: COMMERCIAL

## 2019-08-28 DIAGNOSIS — M54.12 RADICULOPATHY, CERVICAL: Primary | ICD-10-CM

## 2019-08-28 DIAGNOSIS — M54.2 CERVICALGIA: ICD-10-CM

## 2019-08-28 PROCEDURE — 97012 MECHANICAL TRACTION THERAPY: CPT

## 2019-08-28 PROCEDURE — 97110 THERAPEUTIC EXERCISES: CPT

## 2019-08-28 PROCEDURE — 97140 MANUAL THERAPY 1/> REGIONS: CPT

## 2019-08-28 PROCEDURE — 97112 NEUROMUSCULAR REEDUCATION: CPT

## 2019-08-28 NOTE — PROGRESS NOTES
Daily Note     Today's date: 2019  Patient name: Leonela Bright  : 1965  MRN: [id number]  Referring provider: Maria De Jesus Siegel PA-C  Dx:   Encounter Diagnosis     ICD-10-CM    1  Radiculopathy, cervical M54 12    2  Cervicalgia M54 2        Start Time: 6409  Stop Time: 1700  Total time in clinic (min): 65 minutes    Subjective: Pt reports that he does not have pain today in his neck, denies pain with cervical active ROM  Believes PT is helping keep his pain away and that his numbness is less  Objective: See treatment diary below    Assessment: Tolerated treatment well  No pain throughout  Able to increase reps to pt tolerance  Plan: Continue per plan of care        Precautions: HTN  Dx: Cervical flexion hypomobility- radiculopathy c traction classification    Daily Treatment Diary     Manuals    C ext prom nv c retraction 8' 8' LK  8' 5 5'                                        Exercise Diary         Thoracic ext over chair :05 x10  :05x10  10x5" 5"   x 10  5"x10             Prone ys towel for face 2x10 3x10   2x10 2 x 10  2x10   Prone ts- towel for face 2x10 3x10   2x10 2 x 10  2x10   UBE fwd/back 3/3 min 3'/3'   6 min 6 min  6 min   RTB wall walks 7x 7x       Cervical extension 10x 10x  10x 10 x10   LTR c opp UE x10 x10  10x :05 :05x10 5"x10   DNF c lift 10x:05 10x :05  10x5" 5"x 10  5"x10   BTB row/LPD 2x10 2x10  BTB   2x10 BTB 2 x 10  BTB   2 x 10                                                                                                       Modalities         Traction- static #25  2 step  11' #25  2 step  11'  25#  2 step up/down 25# 2 step up/down 25#   2 step up/down  6'

## 2019-08-30 DIAGNOSIS — I10 ESSENTIAL HYPERTENSION: ICD-10-CM

## 2019-08-30 RX ORDER — TRIAMTERENE AND HYDROCHLOROTHIAZIDE 37.5; 25 MG/1; MG/1
1 TABLET ORAL DAILY
Qty: 90 TABLET | Refills: 3 | Status: SHIPPED | OUTPATIENT
Start: 2019-08-30 | End: 2020-08-07

## 2019-09-03 ENCOUNTER — OFFICE VISIT (OUTPATIENT)
Dept: PHYSICAL THERAPY | Facility: CLINIC | Age: 54
End: 2019-09-03
Payer: COMMERCIAL

## 2019-09-03 DIAGNOSIS — M54.12 RADICULOPATHY, CERVICAL: Primary | ICD-10-CM

## 2019-09-03 DIAGNOSIS — M54.2 CERVICALGIA: ICD-10-CM

## 2019-09-03 PROCEDURE — 97110 THERAPEUTIC EXERCISES: CPT

## 2019-09-03 PROCEDURE — 97140 MANUAL THERAPY 1/> REGIONS: CPT

## 2019-09-03 PROCEDURE — 97112 NEUROMUSCULAR REEDUCATION: CPT

## 2019-09-03 PROCEDURE — 97012 MECHANICAL TRACTION THERAPY: CPT

## 2019-09-03 NOTE — PROGRESS NOTES
Daily Note     Today's date: 9/3/2019  Patient name: Long Baez  : 1965  MRN: [id number]  Referring provider: Raulito Castillo PA-C  Dx:   Encounter Diagnosis     ICD-10-CM    1  Radiculopathy, cervical M54 12    2  Cervicalgia M54 2        Start Time: 1600  Stop Time: 1347  Total time in clinic (min): 65 minutes    Subjective: Pt reports that he had no pain after work today, but did note fatigue in his cervical neck muscles  Objective: See treatment diary below  Pt will do prone Y's and T's for his HEP  Assessment: Tolerated treatment well  Fatigues with prone Y's this session  Cervical extension pain-free this session  Continues with relief from traction  FOTO score improved this session  Plan: Continue per plan of care        Precautions: HTN  Dx: Cervical flexion hypomobility- radiculopathy c traction classification    Daily Treatment Diary     Manuals    C ext prom nv c retraction 8' 8' LK 8' LK  5 5'                                        Exercise Diary         Thoracic ext over chair :05 x10  :05x10 :05x10  5"   x 10  5"x10             Prone ys towel for face 2x10 3x10  3x10  2 x 10  2x10   Prone ts- towel for face 2x10 3x10  3x10  2 x 10  2x10   UBE fwd/back 3/3 min 3'/3'  3'/3'  6 min  6 min   RTB wall walks 7x 7x 7x      Cervical extension 10x 10x 10x  10 x10   LTR c opp UE x10 x10 x10  :05x10 5"x10   DNF c lift 10x:05 10x :05 15x :05  5"x 10  5"x10   BTB row/LPD 2x10 2x10 2x10  BTB 2 x 10  BTB   2 x 10                                                                                                       Modalities         Traction- static #25  2 step  11' #25  2 step  11' 25#  2 step   11'  25# 2 step up/down 25#   2 step up/down  6'

## 2019-09-06 ENCOUNTER — OFFICE VISIT (OUTPATIENT)
Dept: PHYSICAL THERAPY | Facility: CLINIC | Age: 54
End: 2019-09-06
Payer: COMMERCIAL

## 2019-09-06 DIAGNOSIS — M54.2 CERVICALGIA: ICD-10-CM

## 2019-09-06 DIAGNOSIS — M54.12 RADICULOPATHY, CERVICAL: Primary | ICD-10-CM

## 2019-09-06 PROCEDURE — 97110 THERAPEUTIC EXERCISES: CPT | Performed by: PHYSICAL THERAPIST

## 2019-09-06 PROCEDURE — 97112 NEUROMUSCULAR REEDUCATION: CPT | Performed by: PHYSICAL THERAPIST

## 2019-09-06 PROCEDURE — 97012 MECHANICAL TRACTION THERAPY: CPT | Performed by: PHYSICAL THERAPIST

## 2019-09-06 NOTE — PROGRESS NOTES
Daily Note     Today's date: 2019  Patient name: Tracee Smith  : 1965  MRN: [id number]  Referring provider: Norman Mcgregor PA-C  Dx:   Encounter Diagnosis     ICD-10-CM    1  Radiculopathy, cervical M54 12    2  Cervicalgia M54 2                   Subjective: Pt reports that he has continued numbness as he always has  No changes since last visit  Objective: See treatment diary below  Pt will do prone Y's and T's for his HEP  Assessment: Tolerated treatment well  Demonstrated fatigue with Prone Y's and T's  Progressed rows today which he tolerated well  Continues to report improvement following cervical traction  Would benefit from continued PT  Plan: Continue per plan of care        Precautions: HTN  Dx: Cervical flexion hypomobility- radiculopathy c traction classification    Daily Treatment Diary     Manuals      C ext prom nv c retraction 8' 8' LK 8' LK 8' DD                                          Exercise Diary         Thoracic ext over chair :05 x10  :05x10 :05x10 :05x10              Prone y's towel for face 2x10 3x10  3x10 3x10     Prone t's- towel for face 2x10 3x10  3x10 3x10     UBE fwd/back 3/3 min 3'/3'  3'/3' 3'/3'     RTB wall walks 7x 7x 7x      Cervical extension 10x 10x 10x 10x     LTR c opp UE x10 x10 x10 x10     DNF c lift 10x:05 10x :05 15x :05 15x :05     BTB row/LPD 2x10 2x10 2x10 Blk for rows, kely for LPD  2x10 ea                                                                                                        Modalities         Traction- static #25  2 step  11 #25  2 step  #  2 step   #  2 step

## 2019-09-09 ENCOUNTER — OFFICE VISIT (OUTPATIENT)
Dept: PHYSICAL THERAPY | Facility: CLINIC | Age: 54
End: 2019-09-09
Payer: COMMERCIAL

## 2019-09-09 DIAGNOSIS — M54.2 CERVICALGIA: ICD-10-CM

## 2019-09-09 DIAGNOSIS — M54.12 RADICULOPATHY, CERVICAL: Primary | ICD-10-CM

## 2019-09-09 PROCEDURE — 97140 MANUAL THERAPY 1/> REGIONS: CPT | Performed by: PHYSICAL THERAPIST

## 2019-09-09 PROCEDURE — 97012 MECHANICAL TRACTION THERAPY: CPT | Performed by: PHYSICAL THERAPIST

## 2019-09-09 PROCEDURE — 97110 THERAPEUTIC EXERCISES: CPT | Performed by: PHYSICAL THERAPIST

## 2019-09-09 NOTE — PROGRESS NOTES
Daily Note     Today's date: 2019  Patient name: Gustavo Gutiérrez  : 1965  MRN: [id number]  Referring provider: Wil Simon PA-C  Dx:   Encounter Diagnosis     ICD-10-CM    1  Radiculopathy, cervical M54 12    2  Cervicalgia M54 2                   Subjective: Patient stated no significant pain prior to treatment session  Objective: See treatment diary below  Pt will do prone Y's and T's for his HEP  Assessment: Patient performed exercises without c/o pain; positive response to manual intervention and mechanical traction  Plan: Continue per plan of care        Precautions: HTN  Dx: Cervical flexion hypomobility- radiculopathy c traction classification    Daily Treatment Diary     Manuals     C ext prom nv c retraction 8' 8' LK 8' LK 8' DD KK                                         Exercise Diary         Thoracic ext over chair :05 x10  :05x10 :05x10 :05x10 :05x10             Prone y's towel for face 2x10 3x10  3x10 3x10 3x10    Prone t's- towel for face 2x10 3x10  3x10 3x10 3x10    UBE fwd/back 3/3 min 3'/3'  3'/3' 3'/3' 3'/3'    RTB wall walks 7x 7x 7x      Cervical extension 10x 10x 10x 10x 10x    LTR c opp UE x10 x10 x10 x10 x10    DNF c lift 10x:05 10x :05 15x :05 15x :05 15x :05    BTB row/LPD 2x10 2x10 2x10 Blk for rows, kely for LPD  2x10 ea Blk for rows, kely for LPD  2x10 ea                                                                                                       Modalities         Traction- static #25  2 step  11 #25  2 step  #  2 step   #  2 step   #  2 step

## 2019-09-11 ENCOUNTER — OFFICE VISIT (OUTPATIENT)
Dept: PHYSICAL THERAPY | Facility: CLINIC | Age: 54
End: 2019-09-11
Payer: COMMERCIAL

## 2019-09-11 DIAGNOSIS — M54.2 CERVICALGIA: ICD-10-CM

## 2019-09-11 DIAGNOSIS — M54.12 RADICULOPATHY, CERVICAL: Primary | ICD-10-CM

## 2019-09-11 PROCEDURE — 97012 MECHANICAL TRACTION THERAPY: CPT

## 2019-09-11 PROCEDURE — 97140 MANUAL THERAPY 1/> REGIONS: CPT | Performed by: PHYSICAL THERAPIST

## 2019-09-11 PROCEDURE — 97110 THERAPEUTIC EXERCISES: CPT

## 2019-09-11 NOTE — PROGRESS NOTES
Daily Note     Today's date: 2019  Patient name: Leonela Bright  : 1965  MRN: [id number]  Referring provider: Maria De Jesus Siegel PA-C  Dx:   Encounter Diagnosis     ICD-10-CM    1  Radiculopathy, cervical M54 12    2  Cervicalgia M54 2        Start Time: 7495  Stop Time: 7728  Total time in clinic (min): 65 minutes    Subjective: Patient reports strength and range improvements since starting PT  Objective: See treatment diary below  DPT WA   Sensation: L 1-3 digits more decreased sensation, 4-5 digit decreased sensation  Reflexes: WFL except C7 L 1+  Myotomes: WNL  Neck ROM:  Ext 55  Flex   61  R  63  L 71 79  DNF WFL  Mid trap: R 4/5 L 4+/5  Low trap: R 4+/5 L 4+/5   R SB painful but improved c R rib inf mobilization and R C4-C5 SG     Assessment: Patient tolerated session well  Educated in self 1st rib mob with good tolerance  Updated HEP and issued bands       Plan: Pt will follow up as needed      Precautions: HTN  Dx: Cervical flexion hypomobility- radiculopathy c traction classification    Daily Treatment Diary     Manuals    C ext prom nv c retraction 8' 8' LK 8' LK 8' DD KK    R inf rib       WA   R c4-c5 side glides      WA   Assessment       WA  20'             Exercise Diary         Thoracic ext over chair :05 x10  :05x10 :05x10 :05x10 :05x10 :05x10            Prone y's towel for face 2x10 3x10  3x10 3x10 3x10 HEP   Prone t's- towel for face 2x10 3x10  3x10 3x10 3x10 HEP   UBE fwd/back 3/3 min 3'/3'  3'/3' 3'/3' 3'/3' 3'/3'   RTB wall walks 7x 7x 7x      Cervical extension 10x 10x 10x 10x 10x 10x   LTR c opp UE x10 x10 x10 x10 x10    DNF c lift 10x:05 10x :05 15x :05 15x :05 15x :05    BTB row/LPD 2x10 2x10 2x10 Blk for rows, kely for LPD  2x10 ea Blk for rows, kely for LPD  2x10 ea    R inf rib mob with green strap  With R side bend       10x   R inf rib mob with green strap  With R rotation       10x Modalities         Traction- static #25  2 step  11' #25  2 step  11' 25#  2 step   11' 25#  2 step   11' 25#  2 step   11' 25#  2 step   11'

## 2019-10-04 NOTE — PROGRESS NOTES
Discharge Note  Sarthak Delgadillo has made good functional progress with physical therapy  he was educated and updated in his home exercise program  Collette Lords will be discharged from formal therapy due to independence in HEP but will follow up as needed

## 2019-10-23 ENCOUNTER — OFFICE VISIT (OUTPATIENT)
Dept: FAMILY MEDICINE CLINIC | Facility: MEDICAL CENTER | Age: 54
End: 2019-10-23
Payer: COMMERCIAL

## 2019-10-23 VITALS
DIASTOLIC BLOOD PRESSURE: 82 MMHG | WEIGHT: 170 LBS | HEART RATE: 74 BPM | BODY MASS INDEX: 25.1 KG/M2 | SYSTOLIC BLOOD PRESSURE: 138 MMHG

## 2019-10-23 DIAGNOSIS — Z12.5 SCREENING FOR PROSTATE CANCER: ICD-10-CM

## 2019-10-23 DIAGNOSIS — J45.20 MILD INTERMITTENT ASTHMA, UNSPECIFIED WHETHER COMPLICATED: ICD-10-CM

## 2019-10-23 DIAGNOSIS — I10 ESSENTIAL HYPERTENSION: ICD-10-CM

## 2019-10-23 DIAGNOSIS — Z13.29 SCREENING FOR THYROID DISORDER: ICD-10-CM

## 2019-10-23 DIAGNOSIS — Z13.1 SCREENING FOR DIABETES MELLITUS: ICD-10-CM

## 2019-10-23 DIAGNOSIS — F43.23 ADJUSTMENT REACTION WITH ANXIETY AND DEPRESSION: ICD-10-CM

## 2019-10-23 DIAGNOSIS — M79.10 MYALGIA: Primary | ICD-10-CM

## 2019-10-23 DIAGNOSIS — E78.00 PURE HYPERCHOLESTEROLEMIA: ICD-10-CM

## 2019-10-23 DIAGNOSIS — E78.5 HYPERLIPIDEMIA, UNSPECIFIED HYPERLIPIDEMIA TYPE: ICD-10-CM

## 2019-10-23 DIAGNOSIS — J30.1 ALLERGIC RHINITIS DUE TO POLLEN, UNSPECIFIED SEASONALITY: ICD-10-CM

## 2019-10-23 PROCEDURE — 99215 OFFICE O/P EST HI 40 MIN: CPT | Performed by: FAMILY MEDICINE

## 2019-10-23 RX ORDER — ESCITALOPRAM OXALATE 5 MG/1
5 TABLET ORAL DAILY
Qty: 30 TABLET | Refills: 5 | Status: SHIPPED | OUTPATIENT
Start: 2019-10-23 | End: 2019-12-03 | Stop reason: SDUPTHER

## 2019-11-11 ENCOUNTER — OFFICE VISIT (OUTPATIENT)
Dept: FAMILY MEDICINE CLINIC | Facility: MEDICAL CENTER | Age: 54
End: 2019-11-11
Payer: COMMERCIAL

## 2019-11-11 VITALS
WEIGHT: 169 LBS | HEART RATE: 64 BPM | DIASTOLIC BLOOD PRESSURE: 76 MMHG | BODY MASS INDEX: 25.03 KG/M2 | HEIGHT: 69 IN | SYSTOLIC BLOOD PRESSURE: 124 MMHG | RESPIRATION RATE: 16 BRPM

## 2019-11-11 DIAGNOSIS — F41.1 GAD (GENERALIZED ANXIETY DISORDER): ICD-10-CM

## 2019-11-11 DIAGNOSIS — R11.2 NAUSEA AND VOMITING, INTRACTABILITY OF VOMITING NOT SPECIFIED, UNSPECIFIED VOMITING TYPE: Primary | ICD-10-CM

## 2019-11-11 DIAGNOSIS — I10 ESSENTIAL HYPERTENSION: ICD-10-CM

## 2019-11-11 PROCEDURE — 99214 OFFICE O/P EST MOD 30 MIN: CPT | Performed by: FAMILY MEDICINE

## 2019-11-11 RX ORDER — ONDANSETRON 4 MG/1
4 TABLET, ORALLY DISINTEGRATING ORAL EVERY 6 HOURS PRN
Qty: 20 TABLET | Refills: 1 | Status: SHIPPED | OUTPATIENT
Start: 2019-11-11 | End: 2020-02-05 | Stop reason: ALTCHOICE

## 2019-11-12 NOTE — PROGRESS NOTES
Patient is here for follow-up after starting Lexapro 5 mg  Overall he feels that his anxiety is improved  However he is having a significant amount of nausea and sometimes vomiting with the medication  We also reviewed his medication list     We also reviewed his screening blood work  All of which is essentially normal     He has hypertension and takes Dyazide 25 mg and tolerates that well and he has good blood pressure control  He has allergic rhinitis he currently uses an intranasal steroid spray and it is affective at controlling his symptoms  Past medical history, past surgical history, family medical history, social history, and medication list were all reviewed  Review of systems for GI  cardiac pulmonary and neurologic systems are all negative  ENT review of systems is also negative  /76 (BP Location: Left arm, Patient Position: Sitting, Cuff Size: Adult)   Pulse 64   Resp 16   Ht 5' 9" (1 753 m)   Wt 76 7 kg (169 lb)   BMI 24 96 kg/m²     HEENT examination is within normal limits no acute findings  Neck was supple  Chest clear  Cardiac exam revealed a regular rate and rhythm without murmur rub or gallop  Abdomen is soft and nontender  Will continue Lexapro  Will begin Zofran p r n  For nausea for the short term  Will have back in another month  If necessary will consider changing to another antidepressant, perhaps an SNRI

## 2019-11-18 NOTE — PROGRESS NOTES
Hypertension- patient currently taking Dyazide  He tolerates that well  He has a low-salt diet  Blood pressure is well controlled    Myalgias  Patient complains of pain in his muscles especially in the upper extremities and shoulder  No known history of injury  Will begin and check C reactive protein, CK  He also takes rosuvastatin which we will hold for now  Hyperlipidemia    Patient has been taking rosuvastatin  However it appears that he may not be tolerating it  Will check lipid profile after he has been off the rosuvastatin  Generalized anxiety    He is having issues at work  He is not confident with the long-term viability of his position  We have decided to begin Lexapro 5 mg  Will have back in a few weeks to observe response  Allergic rhinitis/asthma    Patient has rescue inhaler that he uses occasionally  He gets good relief  He uses Nasacort and p r n  Antihistamines for his allergic rhinitis and has good control  Past medical history, past surgical history, family medical history, social history, and medication list were all reviewed  Review of systems for GI  cardiac pulmonary and neurologic systems are all negative  ENT review of systems is also negative  As noted above he has miscellaneous myalgias and arthralgias  /82 (BP Location: Left arm, Patient Position: Sitting, Cuff Size: Adult)   Pulse 74   Wt 77 1 kg (170 lb)   BMI 25 10 kg/m²     HEENT examination is within normal limits no acute findings  Neck was supple  Chest clear  Cardiac exam revealed a regular rate and rhythm without murmur rub or gallop  Abdomen is soft and nontender  See above for assessment and plan for the patient's individual problems on the diagnosis list       Will be checking screening blood work as well as workup for myalgias

## 2019-12-03 ENCOUNTER — OFFICE VISIT (OUTPATIENT)
Dept: FAMILY MEDICINE CLINIC | Facility: MEDICAL CENTER | Age: 54
End: 2019-12-03
Payer: COMMERCIAL

## 2019-12-03 VITALS
SYSTOLIC BLOOD PRESSURE: 124 MMHG | WEIGHT: 168.8 LBS | HEART RATE: 72 BPM | RESPIRATION RATE: 16 BRPM | BODY MASS INDEX: 24.93 KG/M2 | DIASTOLIC BLOOD PRESSURE: 86 MMHG

## 2019-12-03 DIAGNOSIS — J31.0 RHINOSINUSITIS: Primary | ICD-10-CM

## 2019-12-03 DIAGNOSIS — F43.23 ADJUSTMENT REACTION WITH ANXIETY AND DEPRESSION: ICD-10-CM

## 2019-12-03 DIAGNOSIS — J32.9 RHINOSINUSITIS: Primary | ICD-10-CM

## 2019-12-03 PROCEDURE — 99214 OFFICE O/P EST MOD 30 MIN: CPT | Performed by: FAMILY MEDICINE

## 2019-12-03 PROCEDURE — 1036F TOBACCO NON-USER: CPT | Performed by: FAMILY MEDICINE

## 2019-12-03 RX ORDER — IPRATROPIUM BROMIDE 42 UG/1
2 SPRAY, METERED NASAL 3 TIMES DAILY
Qty: 15 ML | Refills: 1 | Status: SHIPPED | OUTPATIENT
Start: 2019-12-03 | End: 2020-02-05 | Stop reason: ALTCHOICE

## 2019-12-03 RX ORDER — ESCITALOPRAM OXALATE 5 MG/1
5 TABLET ORAL DAILY
Qty: 90 TABLET | Refills: 3 | Status: SHIPPED | OUTPATIENT
Start: 2019-12-03 | End: 2020-11-03

## 2019-12-03 NOTE — PROGRESS NOTES
We started Lexapro about a month or so ago for anxiety  He feels that it is working fairly well for his anxiety  At his last visit he complained bitterly of some nausea and stomach upset  I provided him with a prescription for Zofran and suggested that he start taking his medication with the evening meal     That has pretty much done it for him, GI symptoms are now gone  He still feels that the Lexapro at the current doses working well  No suicidal ideation, etc    He has cold symptoms today, runny nose scratchy throat cough  He has had it for about 5-6 days  No high fevers  /86 (Cuff Size: Standard)   Pulse 72   Resp 16   Wt 76 6 kg (168 lb 12 8 oz)   BMI 24 93 kg/m²     ENT was normal except for red nasal turbinates and postnasal drip  Some lymphoid hyperplasia in the pharynx  Mild cervical lymphadenopathy chest was completely clear to percussion auscultation cardiac exam was normal    Continue Lexapro    Will try p r n  Atrovent nasal spray and OTC symptom relievers  Recheck in about three months

## 2020-02-05 ENCOUNTER — OFFICE VISIT (OUTPATIENT)
Dept: FAMILY MEDICINE CLINIC | Facility: MEDICAL CENTER | Age: 55
End: 2020-02-05
Payer: COMMERCIAL

## 2020-02-05 VITALS
DIASTOLIC BLOOD PRESSURE: 80 MMHG | HEIGHT: 68 IN | BODY MASS INDEX: 25.58 KG/M2 | WEIGHT: 168.8 LBS | RESPIRATION RATE: 16 BRPM | HEART RATE: 80 BPM | SYSTOLIC BLOOD PRESSURE: 124 MMHG

## 2020-02-05 DIAGNOSIS — J45.20 MILD INTERMITTENT ASTHMA, UNSPECIFIED WHETHER COMPLICATED: ICD-10-CM

## 2020-02-05 DIAGNOSIS — E78.00 PURE HYPERCHOLESTEROLEMIA: ICD-10-CM

## 2020-02-05 DIAGNOSIS — Z12.11 SCREENING FOR MALIGNANT NEOPLASM OF COLON: Primary | ICD-10-CM

## 2020-02-05 PROCEDURE — 1036F TOBACCO NON-USER: CPT | Performed by: FAMILY MEDICINE

## 2020-02-05 PROCEDURE — 3079F DIAST BP 80-89 MM HG: CPT | Performed by: FAMILY MEDICINE

## 2020-02-05 PROCEDURE — 3074F SYST BP LT 130 MM HG: CPT | Performed by: FAMILY MEDICINE

## 2020-02-05 PROCEDURE — 3008F BODY MASS INDEX DOCD: CPT | Performed by: FAMILY MEDICINE

## 2020-02-05 PROCEDURE — 99203 OFFICE O/P NEW LOW 30 MIN: CPT | Performed by: FAMILY MEDICINE

## 2020-02-05 PROCEDURE — 99396 PREV VISIT EST AGE 40-64: CPT | Performed by: FAMILY MEDICINE

## 2020-02-05 RX ORDER — ALBUTEROL SULFATE 90 UG/1
2 AEROSOL, METERED RESPIRATORY (INHALATION) EVERY 6 HOURS PRN
Qty: 3 INHALER | Refills: 3 | Status: SHIPPED | OUTPATIENT
Start: 2020-02-05 | End: 2021-12-21 | Stop reason: SDUPTHER

## 2020-02-05 RX ORDER — ATORVASTATIN CALCIUM 20 MG/1
20 TABLET, FILM COATED ORAL DAILY
Qty: 30 TABLET | Refills: 5 | Status: SHIPPED | OUTPATIENT
Start: 2020-02-05 | End: 2020-08-05

## 2020-03-17 DIAGNOSIS — E78.00 PURE HYPERCHOLESTEROLEMIA: Primary | ICD-10-CM

## 2020-03-17 RX ORDER — EZETIMIBE 10 MG/1
10 TABLET ORAL DAILY
Qty: 90 TABLET | Refills: 1 | Status: SHIPPED | OUTPATIENT
Start: 2020-03-17 | End: 2020-04-13

## 2020-04-13 DIAGNOSIS — E78.00 PURE HYPERCHOLESTEROLEMIA: ICD-10-CM

## 2020-04-14 RX ORDER — EZETIMIBE 10 MG/1
10 TABLET ORAL DAILY
Qty: 90 TABLET | Refills: 1 | Status: SHIPPED | OUTPATIENT
Start: 2020-04-14 | End: 2020-08-07

## 2020-08-05 ENCOUNTER — OFFICE VISIT (OUTPATIENT)
Dept: FAMILY MEDICINE CLINIC | Facility: MEDICAL CENTER | Age: 55
End: 2020-08-05
Payer: COMMERCIAL

## 2020-08-05 VITALS
DIASTOLIC BLOOD PRESSURE: 70 MMHG | SYSTOLIC BLOOD PRESSURE: 122 MMHG | BODY MASS INDEX: 25.46 KG/M2 | WEIGHT: 168 LBS | HEART RATE: 68 BPM | TEMPERATURE: 97.8 F | RESPIRATION RATE: 16 BRPM | HEIGHT: 68 IN

## 2020-08-05 DIAGNOSIS — J45.20 MILD INTERMITTENT ASTHMA, UNSPECIFIED WHETHER COMPLICATED: Primary | ICD-10-CM

## 2020-08-05 DIAGNOSIS — I10 ESSENTIAL HYPERTENSION: ICD-10-CM

## 2020-08-05 DIAGNOSIS — E78.00 PURE HYPERCHOLESTEROLEMIA: ICD-10-CM

## 2020-08-05 DIAGNOSIS — F41.1 GAD (GENERALIZED ANXIETY DISORDER): ICD-10-CM

## 2020-08-05 PROCEDURE — 1036F TOBACCO NON-USER: CPT | Performed by: FAMILY MEDICINE

## 2020-08-05 PROCEDURE — 3074F SYST BP LT 130 MM HG: CPT | Performed by: FAMILY MEDICINE

## 2020-08-05 PROCEDURE — 99214 OFFICE O/P EST MOD 30 MIN: CPT | Performed by: FAMILY MEDICINE

## 2020-08-05 PROCEDURE — 3078F DIAST BP <80 MM HG: CPT | Performed by: FAMILY MEDICINE

## 2020-08-05 PROCEDURE — 3008F BODY MASS INDEX DOCD: CPT | Performed by: FAMILY MEDICINE

## 2020-08-05 NOTE — ASSESSMENT & PLAN NOTE
Currently well controlled with max side  He is tolerating the medication well    Continue management

## 2020-08-05 NOTE — ASSESSMENT & PLAN NOTE
Overall doing well  He does use an occasional dose of his rescue inhaler currently feeling well    Will renew his albuterol inhaler

## 2020-08-05 NOTE — PROGRESS NOTES
Assessment/Plan:    Mild intermittent asthma  Overall doing well  He does use an occasional dose of his rescue inhaler currently feeling well  Will renew his albuterol inhaler        Essential hypertension   Currently well controlled with max side  He is tolerating the medication well  Continue management        Pure hypercholesterolemia  Does not tolerate statins, taking Zetia    LDL dropped from 192-132  LF Diet  GABBY (generalized anxiety disorder)  Currently doing well with Lexapro 5 mg  Diagnoses and all orders for this visit:    Mild intermittent asthma, unspecified whether complicated    Pure hypercholesterolemia    GABBY (generalized anxiety disorder)    Essential hypertension        Subjective:      Patient ID: Chuy Fuentes is a 53 y o  male  HPI    The following portions of the patient's history were reviewed and updated as appropriate: allergies, current medications, past family history, past medical history, past social history, past surgical history and problem list     Review of Systems   Constitutional: Negative for activity change, fatigue and fever  HENT: Negative for congestion, ear discharge, ear pain, postnasal drip, rhinorrhea, sinus pain, sneezing and sore throat  Eyes: Negative for photophobia, pain, discharge and redness  Respiratory: Negative for apnea, cough, shortness of breath and wheezing  Cardiovascular: Negative for chest pain and palpitations  Gastrointestinal: Negative for abdominal pain, blood in stool, constipation, diarrhea, nausea and vomiting  Endocrine: Negative for polydipsia, polyphagia and polyuria  Genitourinary: Negative for decreased urine volume, difficulty urinating, discharge, dysuria, frequency, penile pain and urgency  Musculoskeletal: Negative for arthralgias, gait problem, joint swelling and neck pain  Skin: Negative for color change and rash  Neurological: Negative for dizziness, tremors, seizures, weakness and headaches  Psychiatric/Behavioral: Negative for agitation and sleep disturbance  The patient is not nervous/anxious  Objective:      /70 (BP Location: Left arm, Patient Position: Sitting, Cuff Size: Adult)   Pulse 68   Temp 97 8 °F (36 6 °C)   Resp 16   Ht 5' 8" (1 727 m)   Wt 76 2 kg (168 lb)   BMI 25 54 kg/m²          Physical Exam   Constitutional: He is oriented to person, place, and time  He appears well-developed  No distress  HENT:   Head: Normocephalic  Eyes: Pupils are equal, round, and reactive to light  Neck: Normal range of motion  Pulmonary/Chest: Effort normal  No respiratory distress  Neurological: He is alert and oriented to person, place, and time  Skin: He is not diaphoretic     Psychiatric: His behavior is normal  Judgment and thought content normal

## 2020-08-07 DIAGNOSIS — E78.00 PURE HYPERCHOLESTEROLEMIA: ICD-10-CM

## 2020-08-07 DIAGNOSIS — I10 ESSENTIAL HYPERTENSION: ICD-10-CM

## 2020-08-07 RX ORDER — TRIAMTERENE AND HYDROCHLOROTHIAZIDE 37.5; 25 MG/1; MG/1
1 TABLET ORAL DAILY
Qty: 90 TABLET | Refills: 3 | Status: SHIPPED | OUTPATIENT
Start: 2020-08-07 | End: 2021-09-20

## 2020-08-07 RX ORDER — EZETIMIBE 10 MG/1
TABLET ORAL
Qty: 90 TABLET | Refills: 3 | Status: SHIPPED | OUTPATIENT
Start: 2020-08-07 | End: 2021-09-20

## 2020-11-03 DIAGNOSIS — F43.23 ADJUSTMENT REACTION WITH ANXIETY AND DEPRESSION: ICD-10-CM

## 2020-11-03 RX ORDER — ESCITALOPRAM OXALATE 5 MG/1
TABLET ORAL
Qty: 90 TABLET | Refills: 3 | Status: SHIPPED | OUTPATIENT
Start: 2020-11-03 | End: 2022-01-03

## 2020-11-15 ENCOUNTER — HOSPITAL ENCOUNTER (EMERGENCY)
Facility: HOSPITAL | Age: 55
Discharge: HOME | End: 2020-11-15
Attending: EMERGENCY MEDICINE | Admitting: EMERGENCY MEDICINE
Payer: COMMERCIAL

## 2020-11-15 VITALS
TEMPERATURE: 98.2 F | WEIGHT: 171.4 LBS | SYSTOLIC BLOOD PRESSURE: 158 MMHG | DIASTOLIC BLOOD PRESSURE: 68 MMHG | OXYGEN SATURATION: 98 % | HEART RATE: 78 BPM | BODY MASS INDEX: 26.06 KG/M2 | RESPIRATION RATE: 16 BRPM

## 2020-11-15 DIAGNOSIS — S61.219A FINGER LACERATION: Primary | ICD-10-CM

## 2020-11-15 PROCEDURE — 99282 EMERGENCY DEPT VISIT SF MDM: CPT | Performed by: EMERGENCY MEDICINE

## 2020-11-15 PROCEDURE — 12001 RPR S/N/AX/GEN/TRNK 2.5CM/<: CPT | Performed by: EMERGENCY MEDICINE

## 2020-11-15 PROCEDURE — 90471 IMMUNIZATION ADMIN: CPT

## 2020-11-15 PROCEDURE — 90715 TDAP VACCINE 7 YRS/> IM: CPT | Performed by: EMERGENCY MEDICINE

## 2020-11-15 PROCEDURE — 99282 EMERGENCY DEPT VISIT SF MDM: CPT

## 2020-11-15 RX ORDER — GINSENG 100 MG
1 CAPSULE ORAL ONCE
Status: COMPLETED | OUTPATIENT
Start: 2020-11-15 | End: 2020-11-15

## 2020-11-15 RX ORDER — LIDOCAINE HYDROCHLORIDE AND EPINEPHRINE 10; 10 MG/ML; UG/ML
20 INJECTION, SOLUTION INFILTRATION; PERINEURAL ONCE
Status: COMPLETED | OUTPATIENT
Start: 2020-11-15 | End: 2020-11-15

## 2020-11-15 RX ADMIN — TETANUS TOXOID, REDUCED DIPHTHERIA TOXOID AND ACELLULAR PERTUSSIS VACCINE, ADSORBED 0.5 ML: 5; 2.5; 8; 8; 2.5 SUSPENSION INTRAMUSCULAR at 16:25

## 2020-11-15 RX ADMIN — BACITRACIN ZINC 1 SMALL APPLICATION: 500 OINTMENT TOPICAL at 16:43

## 2020-11-15 RX ADMIN — LIDOCAINE HYDROCHLORIDE AND EPINEPHRINE 20 ML: 10; 10 INJECTION, SOLUTION INFILTRATION; PERINEURAL at 16:20

## 2020-11-24 ENCOUNTER — OFFICE VISIT (OUTPATIENT)
Dept: URGENT CARE | Facility: CLINIC | Age: 55
End: 2020-11-24
Payer: COMMERCIAL

## 2020-11-24 VITALS
OXYGEN SATURATION: 99 % | HEIGHT: 69 IN | SYSTOLIC BLOOD PRESSURE: 135 MMHG | HEART RATE: 66 BPM | BODY MASS INDEX: 25.33 KG/M2 | DIASTOLIC BLOOD PRESSURE: 76 MMHG | TEMPERATURE: 98.9 F | RESPIRATION RATE: 16 BRPM | WEIGHT: 171 LBS

## 2020-11-24 DIAGNOSIS — Z48.02 ENCOUNTER FOR REMOVAL OF SUTURES: Primary | ICD-10-CM

## 2020-11-24 PROCEDURE — 99213 OFFICE O/P EST LOW 20 MIN: CPT | Performed by: NURSE PRACTITIONER

## 2021-01-27 ENCOUNTER — TELEPHONE (OUTPATIENT)
Dept: FAMILY MEDICINE CLINIC | Facility: MEDICAL CENTER | Age: 56
End: 2021-01-27

## 2021-01-27 DIAGNOSIS — E78.00 PURE HYPERCHOLESTEROLEMIA: ICD-10-CM

## 2021-01-27 DIAGNOSIS — Z11.4 SCREENING FOR HIV (HUMAN IMMUNODEFICIENCY VIRUS): ICD-10-CM

## 2021-01-27 DIAGNOSIS — I10 ESSENTIAL HYPERTENSION: ICD-10-CM

## 2021-01-27 DIAGNOSIS — Z11.59 NEED FOR HEPATITIS C SCREENING TEST: Primary | ICD-10-CM

## 2021-01-27 DIAGNOSIS — E55.9 VITAMIN D DEFICIENCY: ICD-10-CM

## 2021-01-27 DIAGNOSIS — Z12.5 SCREENING FOR PROSTATE CANCER: ICD-10-CM

## 2021-01-27 DIAGNOSIS — F43.23 ADJUSTMENT REACTION WITH ANXIETY AND DEPRESSION: ICD-10-CM

## 2021-03-10 DIAGNOSIS — Z23 ENCOUNTER FOR IMMUNIZATION: ICD-10-CM

## 2021-03-19 ENCOUNTER — IMMUNIZATIONS (OUTPATIENT)
Dept: FAMILY MEDICINE CLINIC | Facility: HOSPITAL | Age: 56
End: 2021-03-19

## 2021-03-19 DIAGNOSIS — Z23 ENCOUNTER FOR IMMUNIZATION: Primary | ICD-10-CM

## 2021-03-19 PROCEDURE — 91300 SARS-COV-2 / COVID-19 MRNA VACCINE (PFIZER-BIONTECH) 30 MCG: CPT

## 2021-03-19 PROCEDURE — 0001A SARS-COV-2 / COVID-19 MRNA VACCINE (PFIZER-BIONTECH) 30 MCG: CPT

## 2021-04-09 ENCOUNTER — TELEPHONE (OUTPATIENT)
Dept: FAMILY MEDICINE CLINIC | Facility: MEDICAL CENTER | Age: 56
End: 2021-04-09

## 2021-04-09 DIAGNOSIS — Z20.822 EXPOSURE TO COVID-19 VIRUS: ICD-10-CM

## 2021-04-09 DIAGNOSIS — Z20.822 EXPOSURE TO COVID-19 VIRUS: Primary | ICD-10-CM

## 2021-04-09 PROCEDURE — U0005 INFEC AGEN DETEC AMPLI PROBE: HCPCS | Performed by: FAMILY MEDICINE

## 2021-04-09 PROCEDURE — U0003 INFECTIOUS AGENT DETECTION BY NUCLEIC ACID (DNA OR RNA); SEVERE ACUTE RESPIRATORY SYNDROME CORONAVIRUS 2 (SARS-COV-2) (CORONAVIRUS DISEASE [COVID-19]), AMPLIFIED PROBE TECHNIQUE, MAKING USE OF HIGH THROUGHPUT TECHNOLOGIES AS DESCRIBED BY CMS-2020-01-R: HCPCS | Performed by: FAMILY MEDICINE

## 2021-04-09 NOTE — TELEPHONE ENCOUNTER
COVID order placed  Symptoms started approx 04/01  The SOB and cough are intermittent and not extreme  He will go for COVID test and treat symptoms OTC  He was advised of red flags and will quarantine

## 2021-04-09 NOTE — TELEPHONE ENCOUNTER
Pt said that he was exposed April 2, contact started showing symptoms 4/6 and tested positive today  Pt is having runny nose, post nasal drip with cough, sob  No fever or gi symptoms or loss of taste or smell  Pt said he usually gets hay fever this time of year and the symptoms are the same  Pt said the same with his son, Michell Almodovar (07/01/98)  They are both due for their second Covid shot on Monday  Please, advise pt

## 2021-04-11 LAB — SARS-COV-2 RNA RESP QL NAA+PROBE: POSITIVE

## 2021-04-12 ENCOUNTER — TELEPHONE (OUTPATIENT)
Dept: FAMILY MEDICINE CLINIC | Facility: MEDICAL CENTER | Age: 56
End: 2021-04-12

## 2021-04-12 NOTE — TELEPHONE ENCOUNTER
Pt left a vm that he saw via My Chart that he is covid positive, and wants to know what his instructions are, and when he can return to work

## 2021-04-12 NOTE — TELEPHONE ENCOUNTER
Triaged- cdc guidelines discussed- c/o mild covid symptoms  Denies fever, cough or SOB  Patient family members have not been quarantining from him  I explained to him the importance of this and when they should be tested since he states they were getting tested now, however they are not symptomatic   Told him they need to quarantine from him and then can get tested on the 5 th day after contact   Aware his Isolation ends after the 10 th day which will be 4/16/2021

## 2021-05-06 ENCOUNTER — IMMUNIZATIONS (OUTPATIENT)
Dept: FAMILY MEDICINE CLINIC | Facility: HOSPITAL | Age: 56
End: 2021-05-06

## 2021-05-06 DIAGNOSIS — Z23 ENCOUNTER FOR IMMUNIZATION: Primary | ICD-10-CM

## 2021-05-06 PROCEDURE — 91300 SARS-COV-2 / COVID-19 MRNA VACCINE (PFIZER-BIONTECH) 30 MCG: CPT

## 2021-05-06 PROCEDURE — 0002A SARS-COV-2 / COVID-19 MRNA VACCINE (PFIZER-BIONTECH) 30 MCG: CPT

## 2021-06-23 NOTE — PROGRESS NOTES
Assessment/Plan:      Diagnoses and all orders for this visit:    Screening for malignant neoplasm of colon  -     Ambulatory referral to Colorectal Surgery; Future    Pure hypercholesterolemia  -     atorvastatin (LIPITOR) 20 mg tablet; Take 1 tablet (20 mg total) by mouth daily  -     Lipid Panel with Direct LDL reflex; Future  -     ALT; Future  -     AST; Future    Mild intermittent asthma, unspecified whether complicated  -     albuterol (VENTOLIN HFA) 90 mcg/act inhaler; Inhale 2 puffs every 6 (six) hours as needed for wheezing          Subjective:     Patient ID: Harper Bishop is a 53 y o  male  Patient is a 53-year-old  He lives at home with his wife  He works full-time  He has three adult children  Asthma    Overall doing well  He does use an occasional dose of his rescue inhaler currently feeling well  Will renew his albuterol inhaler  HTN    Currently well controlled with max side  He is tolerating the medication well  Continue management  Hypercholesterol    Patient currently has LDL greater than 190  He needs a statin  He did not tolerate rosuvastatin  He is willing to try another statin  Will begin atorvastatin  Recheck lipid profile in about six weeks  GABBY    Currently doing well with Lexapro 5 mg  Past medical history, past surgical history, family medical history, social history, and medication list were all reviewed  Review of Systems   Constitutional: Negative for activity change, fatigue and fever  HENT: Negative for congestion, ear discharge, ear pain, postnasal drip, rhinorrhea, sinus pain, sneezing and sore throat  Eyes: Negative for photophobia, pain, discharge and redness  Respiratory: Negative for apnea, cough, shortness of breath and wheezing  Cardiovascular: Negative for chest pain and palpitations  Gastrointestinal: Negative for abdominal pain, blood in stool, constipation, diarrhea, nausea and vomiting     Endocrine: Negative for polydipsia, polyphagia and polyuria  Genitourinary: Negative for decreased urine volume, difficulty urinating, discharge, dysuria, frequency, penile pain and urgency  Musculoskeletal: Negative for arthralgias, gait problem, joint swelling and neck pain  Skin: Negative for color change and rash  Neurological: Negative for dizziness, tremors, seizures, weakness and headaches  Psychiatric/Behavioral: Negative for agitation and sleep disturbance  The patient is not nervous/anxious  Objective:    /80 (Cuff Size: Standard)   Pulse 80   Resp 16   Ht 5' 8" (1 727 m)   Wt 76 6 kg (168 lb 12 8 oz)   BMI 25 67 kg/m²        Physical Exam   Constitutional: He is oriented to person, place, and time  Vital signs are normal  He appears well-developed and well-nourished  He is cooperative  HENT:   Head: Normocephalic  Right Ear: External ear normal    Left Ear: External ear normal    Nose: Nose normal    Mouth/Throat: Oropharynx is clear and moist    Eyes: Pupils are equal, round, and reactive to light  Conjunctivae, EOM and lids are normal    Neck: Normal range of motion  Neck supple  Carotid bruit is not present  No thyromegaly present  Cardiovascular: Normal rate, regular rhythm, S1 normal, S2 normal, normal heart sounds, intact distal pulses and normal pulses  No murmur heard  Pulmonary/Chest: Effort normal and breath sounds normal  No respiratory distress  He has no wheezes  He has no rales  Abdominal: Soft  Normal appearance and bowel sounds are normal  He exhibits no mass  There is no hepatosplenomegaly  There is no tenderness  Musculoskeletal: Normal range of motion  Lymphadenopathy:     He has no cervical adenopathy  Neurological: He is alert and oriented to person, place, and time  He has normal strength and normal reflexes  No cranial nerve deficit or sensory deficit  Skin: Skin is warm, dry and intact  No rash noted  No pallor     Psychiatric: He has a normal mood and affect  His behavior is normal  Judgment and thought content normal  Cognition and memory are normal    Nursing note and vitals reviewed        See above for assessment and plan for the patient's individual problems on the diagnosis list  ACC/AHA stage D systolic heart failure

## 2021-08-05 ENCOUNTER — PATIENT MESSAGE (OUTPATIENT)
Dept: FAMILY MEDICINE CLINIC | Facility: MEDICAL CENTER | Age: 56
End: 2021-08-05

## 2021-08-06 NOTE — TELEPHONE ENCOUNTER
From: Latanya Gleason  To:  Gian Sampson MD  Sent: 8/5/2021 9:12 PM EDT  Subject: Pre-Procedure Testing Question    Just wanted to be sure that I need to get blood work done prior to my physical visit 8/20    If you want to message that to me I could print it and go    [facility]

## 2021-08-10 ENCOUNTER — PATIENT MESSAGE (OUTPATIENT)
Dept: FAMILY MEDICINE CLINIC | Facility: MEDICAL CENTER | Age: 56
End: 2021-08-10

## 2021-08-11 NOTE — TELEPHONE ENCOUNTER
From: Gustavo Gutiérrez  To: Beto Saleem MD  Sent: 8/10/2021 7:50 PM EDT  Subject: Pre-Procedure Testing Question    Amari Cline    Please mail me the documents I need to use a health network lab due to the insurance     Queta Tian      ----- Message -----   From:Charity Muse   Sent:8/6/2021 7:50 AM EDT   To:Sean Thomas   Subject:RE: Pre-Procedure Testing Question    Queta Tian,   If you use a [facility] lab they will have the orders on file for you, you do not need a slip  If you use another lab we can mail it to you or fax it to the lab  Please, let us know      [address] [place] [address]       ----- Message -----   From:Sean Thomas   Sent:8/5/2021 9:12 PM EDT   To:John Villeda MD   Subject:Pre-Procedure Testing Question    Just wanted to be sure that I need to get blood work done prior to my physical visit 8/20    If you want to message that to me I could print it and go    Queta Tian

## 2021-08-20 ENCOUNTER — OFFICE VISIT (OUTPATIENT)
Dept: FAMILY MEDICINE CLINIC | Facility: MEDICAL CENTER | Age: 56
End: 2021-08-20
Payer: COMMERCIAL

## 2021-08-20 VITALS
DIASTOLIC BLOOD PRESSURE: 80 MMHG | TEMPERATURE: 99.1 F | WEIGHT: 170.6 LBS | BODY MASS INDEX: 25.27 KG/M2 | HEIGHT: 69 IN | SYSTOLIC BLOOD PRESSURE: 144 MMHG | HEART RATE: 77 BPM | OXYGEN SATURATION: 98 %

## 2021-08-20 DIAGNOSIS — F41.1 GAD (GENERALIZED ANXIETY DISORDER): ICD-10-CM

## 2021-08-20 DIAGNOSIS — J45.20 MILD INTERMITTENT ASTHMA, UNSPECIFIED WHETHER COMPLICATED: ICD-10-CM

## 2021-08-20 DIAGNOSIS — I10 ESSENTIAL HYPERTENSION: Primary | ICD-10-CM

## 2021-08-20 DIAGNOSIS — J30.1 ALLERGIC RHINITIS DUE TO POLLEN, UNSPECIFIED SEASONALITY: ICD-10-CM

## 2021-08-20 DIAGNOSIS — E78.00 PURE HYPERCHOLESTEROLEMIA: ICD-10-CM

## 2021-08-20 PROBLEM — U09.9 POST-COVID SYNDROME: Status: ACTIVE | Noted: 2021-08-20

## 2021-08-20 PROCEDURE — 1036F TOBACCO NON-USER: CPT | Performed by: FAMILY MEDICINE

## 2021-08-20 PROCEDURE — 3725F SCREEN DEPRESSION PERFORMED: CPT | Performed by: FAMILY MEDICINE

## 2021-08-20 PROCEDURE — 3008F BODY MASS INDEX DOCD: CPT | Performed by: FAMILY MEDICINE

## 2021-08-20 PROCEDURE — 99396 PREV VISIT EST AGE 40-64: CPT | Performed by: FAMILY MEDICINE

## 2021-08-20 PROCEDURE — 99213 OFFICE O/P EST LOW 20 MIN: CPT | Performed by: FAMILY MEDICINE

## 2021-08-20 NOTE — PROGRESS NOTES
Assessment/Plan:    GABBY (generalized anxiety disorder)  Currently doing well with Lexapro 5 mg  Essential hypertension   Currently well controlled with max side  He is tolerating the medication well  Continue management        Pure hypercholesterolemia  Does not tolerate statins, taking Zetia    LDL dropped from 192-141  LF Diet  Mild intermittent asthma  Overall doing well  He does use an occasional dose of his rescue inhaler currently feeling well  Will renew his albuterol inhaler        Allergic rhinitis  Patient takes Nasacort every day and is doing its job well  Post-COVID syndrome  He had a mild COVID case in between his two injections  It was in April  He still complains of severe fatigue from time to time  Um will continue to power through it  Problem List Items Addressed This Visit        Respiratory    Allergic rhinitis     Patient takes Nasacort every day and is doing its job well  Mild intermittent asthma     Overall doing well  He does use an occasional dose of his rescue inhaler currently feeling well  Will renew his albuterol inhaler                Cardiovascular and Mediastinum    Essential hypertension - Primary      Currently well controlled with max side  He is tolerating the medication well  Continue management                Other    Pure hypercholesterolemia     Does not tolerate statins, taking Zetia    LDL dropped from 192-141  LF Diet  GABBY (generalized anxiety disorder)     Currently doing well with Lexapro 5 mg  Subjective:      Patient ID: Tamar Paiz is a 63 y o  male  This is a 67-year-old man  He is in overall good health  He has been my patient for the last 30-35 years  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, and this is an issue because he is concerned about his job  He is  and they have three adult children    The youngest one is still at home, he works at his father's work place  He is up-to-date with colonoscopy  He needs to go back in three years because there was a polyp  His PSA is also on today  The following portions of the patient's history were reviewed and updated as appropriate: allergies, current medications, past family history, past medical history, past social history, past surgical history and problem list     Review of Systems   Constitutional: Positive for fatigue  Negative for activity change and fever  HENT: Negative for congestion, ear discharge, ear pain, postnasal drip, rhinorrhea, sinus pain, sneezing and sore throat  Eyes: Negative for photophobia, pain, discharge and redness  Respiratory: Negative for apnea, cough, shortness of breath and wheezing  Cardiovascular: Negative for chest pain and palpitations  Gastrointestinal: Negative for abdominal pain, blood in stool, constipation, diarrhea, nausea and vomiting  Endocrine: Negative for polydipsia, polyphagia and polyuria  Genitourinary: Negative for decreased urine volume, difficulty urinating, discharge, dysuria, frequency, penile pain and urgency  Musculoskeletal: Negative for arthralgias, gait problem, joint swelling and neck pain  Skin: Negative for color change and rash  Neurological: Negative for dizziness, tremors, seizures, weakness and headaches  Psychiatric/Behavioral: Negative for agitation and sleep disturbance  The patient is not nervous/anxious  Objective:      /80 (BP Location: Left arm, Patient Position: Sitting, Cuff Size: Adult)   Pulse 77   Temp 99 1 °F (37 3 °C)   Ht 5' 9" (1 753 m)   Wt 77 4 kg (170 lb 9 6 oz)   SpO2 98%   BMI 25 19 kg/m²          Physical Exam  Vitals and nursing note reviewed  Constitutional:       Appearance: Normal appearance  He is well-developed  HENT:      Head: Normocephalic        Right Ear: External ear normal       Left Ear: External ear normal       Nose: Nose normal       Mouth/Throat: Mouth: Mucous membranes are moist    Eyes:      General: Lids are normal       Conjunctiva/sclera: Conjunctivae normal       Pupils: Pupils are equal, round, and reactive to light  Neck:      Thyroid: No thyromegaly  Vascular: No carotid bruit  Cardiovascular:      Rate and Rhythm: Normal rate and regular rhythm  Pulses: Normal pulses  Heart sounds: Normal heart sounds, S1 normal and S2 normal  No murmur heard  Pulmonary:      Effort: Pulmonary effort is normal  No respiratory distress  Breath sounds: Normal breath sounds  No wheezing or rales  Abdominal:      General: Bowel sounds are normal       Palpations: Abdomen is soft  There is no mass  Tenderness: There is no abdominal tenderness  Musculoskeletal:         General: Normal range of motion  Cervical back: Normal range of motion and neck supple  Lymphadenopathy:      Cervical: No cervical adenopathy  Skin:     General: Skin is warm and dry  Coloration: Skin is not pale  Findings: No rash  Neurological:      Mental Status: He is alert and oriented to person, place, and time  Cranial Nerves: No cranial nerve deficit  Sensory: No sensory deficit  Deep Tendon Reflexes: Reflexes are normal and symmetric  Psychiatric:         Behavior: Behavior normal  Behavior is cooperative  Thought Content:  Thought content normal          Judgment: Judgment normal

## 2021-08-20 NOTE — ASSESSMENT & PLAN NOTE
He had a mild COVID case in between his two injections  It was in April  He still complains of severe fatigue from time to time  Um will continue to power through it

## 2021-09-19 DIAGNOSIS — E78.00 PURE HYPERCHOLESTEROLEMIA: ICD-10-CM

## 2021-09-19 DIAGNOSIS — I10 ESSENTIAL HYPERTENSION: ICD-10-CM

## 2021-09-20 RX ORDER — EZETIMIBE 10 MG/1
TABLET ORAL
Qty: 90 TABLET | Refills: 3 | Status: SHIPPED | OUTPATIENT
Start: 2021-09-20

## 2021-09-20 RX ORDER — TRIAMTERENE AND HYDROCHLOROTHIAZIDE 37.5; 25 MG/1; MG/1
1 TABLET ORAL DAILY
Qty: 90 TABLET | Refills: 3 | Status: SHIPPED | OUTPATIENT
Start: 2021-09-20

## 2021-12-21 ENCOUNTER — TELEMEDICINE (OUTPATIENT)
Dept: FAMILY MEDICINE CLINIC | Facility: MEDICAL CENTER | Age: 56
End: 2021-12-21
Payer: COMMERCIAL

## 2021-12-21 VITALS — SYSTOLIC BLOOD PRESSURE: 148 MMHG | DIASTOLIC BLOOD PRESSURE: 95 MMHG

## 2021-12-21 DIAGNOSIS — J30.1 ALLERGIC RHINITIS DUE TO POLLEN, UNSPECIFIED SEASONALITY: ICD-10-CM

## 2021-12-21 DIAGNOSIS — J06.9 UPPER RESPIRATORY TRACT INFECTION, UNSPECIFIED TYPE: Primary | ICD-10-CM

## 2021-12-21 DIAGNOSIS — I10 ESSENTIAL HYPERTENSION: ICD-10-CM

## 2021-12-21 DIAGNOSIS — J45.20 MILD INTERMITTENT ASTHMA, UNSPECIFIED WHETHER COMPLICATED: ICD-10-CM

## 2021-12-21 DIAGNOSIS — U09.9 POST-COVID SYNDROME: ICD-10-CM

## 2021-12-21 PROCEDURE — 99215 OFFICE O/P EST HI 40 MIN: CPT | Performed by: NURSE PRACTITIONER

## 2021-12-21 PROCEDURE — 3080F DIAST BP >= 90 MM HG: CPT | Performed by: NURSE PRACTITIONER

## 2021-12-21 PROCEDURE — 3077F SYST BP >= 140 MM HG: CPT | Performed by: NURSE PRACTITIONER

## 2021-12-21 PROCEDURE — 1036F TOBACCO NON-USER: CPT | Performed by: NURSE PRACTITIONER

## 2021-12-21 RX ORDER — ALBUTEROL SULFATE 90 UG/1
2 AEROSOL, METERED RESPIRATORY (INHALATION) EVERY 6 HOURS PRN
Qty: 18 G | Refills: 0 | Status: SHIPPED | OUTPATIENT
Start: 2021-12-21 | End: 2022-03-21

## 2021-12-21 RX ORDER — PREDNISONE 20 MG/1
20 TABLET ORAL DAILY
Qty: 5 TABLET | Refills: 0 | Status: SHIPPED | OUTPATIENT
Start: 2021-12-21 | End: 2021-12-26

## 2021-12-21 RX ORDER — AZITHROMYCIN 250 MG/1
TABLET, FILM COATED ORAL
Qty: 6 TABLET | Refills: 0 | Status: SHIPPED | OUTPATIENT
Start: 2021-12-21 | End: 2021-12-26

## 2021-12-27 ENCOUNTER — APPOINTMENT (OUTPATIENT)
Dept: RADIOLOGY | Facility: MEDICAL CENTER | Age: 56
End: 2021-12-27
Payer: COMMERCIAL

## 2021-12-27 DIAGNOSIS — U09.9 POST-COVID SYNDROME: ICD-10-CM

## 2021-12-27 PROCEDURE — 71046 X-RAY EXAM CHEST 2 VIEWS: CPT

## 2022-01-01 DIAGNOSIS — F43.23 ADJUSTMENT REACTION WITH ANXIETY AND DEPRESSION: ICD-10-CM

## 2022-01-03 RX ORDER — ESCITALOPRAM OXALATE 5 MG/1
TABLET ORAL
Qty: 90 TABLET | Refills: 3 | Status: SHIPPED | OUTPATIENT
Start: 2022-01-03

## 2022-01-18 ENCOUNTER — CONSULT (OUTPATIENT)
Dept: PULMONOLOGY | Facility: CLINIC | Age: 57
End: 2022-01-18
Payer: COMMERCIAL

## 2022-01-18 VITALS
OXYGEN SATURATION: 96 % | HEART RATE: 67 BPM | BODY MASS INDEX: 25.45 KG/M2 | TEMPERATURE: 96.8 F | RESPIRATION RATE: 16 BRPM | SYSTOLIC BLOOD PRESSURE: 124 MMHG | WEIGHT: 177.8 LBS | HEIGHT: 70 IN | DIASTOLIC BLOOD PRESSURE: 90 MMHG

## 2022-01-18 DIAGNOSIS — U09.9 POST-COVID SYNDROME: ICD-10-CM

## 2022-01-18 DIAGNOSIS — R07.89 ATYPICAL CHEST PAIN: ICD-10-CM

## 2022-01-18 DIAGNOSIS — J45.20 MILD INTERMITTENT ASTHMA, UNSPECIFIED WHETHER COMPLICATED: Primary | ICD-10-CM

## 2022-01-18 PROBLEM — J06.9 UPPER RESPIRATORY TRACT INFECTION: Status: RESOLVED | Noted: 2021-12-21 | Resolved: 2022-01-18

## 2022-01-18 PROCEDURE — 99204 OFFICE O/P NEW MOD 45 MIN: CPT | Performed by: INTERNAL MEDICINE

## 2022-01-18 RX ORDER — LEVALBUTEROL TARTRATE 45 UG/1
1-2 AEROSOL, METERED ORAL EVERY 4 HOURS PRN
Qty: 45 G | Refills: 3 | Status: SHIPPED | OUTPATIENT
Start: 2022-01-18

## 2022-01-18 NOTE — ASSESSMENT & PLAN NOTE
Given the reproducible nature and point tenderness, suspect this is either costochondritis or intercostal muscle strain  Will observe with treatment of underlying condition

## 2022-01-18 NOTE — ASSESSMENT & PLAN NOTE
I suspect him is having worsening of his underlying reactive airway disease on account his infection with COVID-19      · Check PFTs  · Start Breo 200/25; sample given  · Patient will call within the next two weeks to let us know if Dayana Ford is significantly helpful for him  · Xopenex as above  · Plan on performing CT scan of the chest if no significant benefit from maintenance inhalers  · Follow-up in 6-8 weeks

## 2022-01-18 NOTE — ASSESSMENT & PLAN NOTE
Due to symptoms of tachycardia which limit the amount of times he is willing to take albuterol on a given day, I switched him to Xopenex

## 2022-01-18 NOTE — PROGRESS NOTES
Pulmonary Consultation   Merlin Fitch 61 y o  male MRN: [id number]  1/18/2022      Consulting physician:  Dr Cat Bhat  Assessment:    Mild intermittent asthma  Due to symptoms of tachycardia which limit the amount of times he is willing to take albuterol on a given day, I switched him to Xopenex  Post-COVID syndrome  I suspect him is having worsening of his underlying reactive airway disease on account his infection with COVID-19  · Check PFTs  · Start Breo 200/25; sample given  · Patient will call within the next two weeks to let us know if [facility] [place] significantly helpful for him  · Xopenex as above  · Plan on performing CT scan of the chest if no significant benefit from maintenance inhalers  · Follow-up in 6-8 weeks    Atypical chest pain  Given the reproducible nature and point tenderness, suspect this is either costochondritis or intercostal muscle strain  Will observe with treatment of underlying condition  Plan:    Diagnoses and all orders for this visit:    Mild intermittent asthma, unspecified whether complicated    Post-COVID syndrome  -     levalbuterol (Xopenex HFA) 45 mcg/act inhaler; Inhale 1-2 puffs every 4 (four) hours as needed for wheezing  -     Complete PFT with post bronchodilator; Future  -     fluticasone-vilanterol (Breo Ellipta) 200-25 MCG/INH inhaler; Inhale 1 puff daily Rinse mouth after use  Atypical chest pain    Return in about 6 weeks (around 3/1/2022)  History of Present Illness   HPI:  Merlin Fitch is a 61 y o  male who presents to establish care for worsening shortness of breath  Yadi Garber reports he started to have worsening symptoms over the summer after a diagnosis of COVID in April  He did not require hospitalization or treatment for that diagnosis  Over the summer, he noticed slightly worsening breathlessness  This is in the background of having a history of mild intermittent asthma for which he was on albuterol which he required very infrequently    He did previously require maintenance inhalers for a brief period of time in 2016 after he dealt with a bout of pneumonia, but he has not been on Symbicort for many years, which he has discontinued due to atypical side effects of hair loss  Johny Eagle has seasonal allergies that are worst in spring and winter, and he suspects this had a lot to do with why his symptoms are worse now than they were over the summer in the immediate post COVID phase  Presently, he notes shortness of breath with activity like walking 300-350m, and he has occasional coughing spells productive of green or clear mucus  He does not notice wheezing except occasionally when he is coughing  He also complains of diffuse chest pain, described as a burning with inhalation  He has focal point tenderness on the right parasternal border, which is reproducible with pressure  He reports he has been using his albuterol at least once per day, but limits his use on account of subjective palpitations  He was given a course of antibiotics and steroids which did not improve his symptoms until the last day of treatment; this improvement lasted approximately two days and then his symptoms recurred  His past history is were reviewed  He has no concerning past medical history  He has well-controlled mild intermittent asthma, hypertension, hypercholesterolemia  He had an appendectomy but no other major surgeries  He is a lifelong nonsmoker and works as a   He has a family history of asthma, but otherwise no concerning findings  Review of Systems   HENT: Positive for rhinorrhea  Respiratory: Positive for cough and shortness of breath  Negative for wheezing  Cardiovascular: Positive for chest pain  Allergic/Immunologic: Positive for environmental allergies  All other systems reviewed and are negative        Historical Information   Past Medical History:   Diagnosis Date    Asthma     Depression     Hypertension      Past Surgical History:   Procedure Laterality Date    APPENDECTOMY      VASECTOMY      VasDeferens     Family History   Problem Relation Age of Onset    Anxiety disorder Mother     Hypertension Father     Hyperlipidemia Father     Anxiety disorder Family     Heart disease Family     Stroke Family     No Known Problems Sister     No Known Problems Sister        Meds/Allergies     Current Outpatient Medications:     albuterol (Ventolin HFA) 90 mcg/act inhaler, Inhale 2 puffs every 6 (six) hours as needed for wheezing or shortness of breath (cough), Disp: 18 g, Rfl: 0    escitalopram (LEXAPRO) 5 mg tablet, TAKE 1 TABLET BY MOUTH  DAILY, Disp: 90 tablet, Rfl: 3    ezetimibe (ZETIA) 10 mg tablet, TAKE 1 TABLET BY MOUTH  DAILY, Disp: 90 tablet, Rfl: 3    multivitamin (THERAGRAN) TABS, Take 1 tablet by mouth daily, Disp: , Rfl:     Triamcinolone Acetonide (NASACORT ALLERGY 24HR) 55 MCG/ACT AERO, into each nostril, Disp: , Rfl:     triamterene-hydrochlorothiazide (MAXZIDE-25) 37 5-25 mg per tablet, TAKE 1 TABLET BY MOUTH  DAILY, Disp: 90 tablet, Rfl: 3    diphenhydrAMINE (BENADRYL) 25 mg tablet, Take by mouth (Patient not taking: Reported on 1/18/2022 ), Disp: , Rfl:     fluticasone-vilanterol (Breo Ellipta) 200-25 MCG/INH inhaler, Inhale 1 puff daily Rinse mouth after use , Disp: 28 blister, Rfl: 0    levalbuterol (Xopenex HFA) 45 mcg/act inhaler, Inhale 1-2 puffs every 4 (four) hours as needed for wheezing, Disp: 45 g, Rfl: 3  Allergies   Allergen Reactions    Budesonide-Formoterol Fumarate      Category: Adverse Reaction; Annotation - [id number]: Had a effluvium llike response to symbicort  He does do well with oral prednisone, fluticasone NS and short acting beta agonist (ventolin)    Rosuvastatin Myalgia    Seasonal Ic [Cholestatin]        Vitals: Blood pressure 124/90, pulse 67, temperature (!) 96 8 °F (36 °C), temperature source Tympanic, resp   rate 16, height 5' 10" (1 778 m), weight 80 6 kg (177 lb 12 8 oz), SpO2 96 %  Body mass index is 25 51 kg/m²  Oxygen Therapy  SpO2: 96 %  Oxygen Therapy: None (Room air)    Physical Exam  Physical Exam  Vitals reviewed  Constitutional:       General: He is not in acute distress  Appearance: Normal appearance  He is well-developed  He is not ill-appearing  HENT:      Head: Normocephalic and atraumatic  Eyes:      General: No scleral icterus  Conjunctiva/sclera: Conjunctivae normal    Neck:      Thyroid: No thyromegaly  Vascular: No JVD  Cardiovascular:      Rate and Rhythm: Normal rate and regular rhythm  Heart sounds: Normal heart sounds  No murmur heard  No friction rub  No gallop  Pulmonary:      Effort: Pulmonary effort is normal  No respiratory distress  Breath sounds: Normal breath sounds  No wheezing or rales  Musculoskeletal:      Cervical back: Neck supple  Right lower leg: No edema  Left lower leg: No edema  Skin:     General: Skin is warm and dry  Findings: No rash  Neurological:      General: No focal deficit present  Mental Status: He is alert and oriented to person, place, and time  Mental status is at baseline  Psychiatric:         Mood and Affect: Mood normal          Behavior: Behavior normal      Labs: I have personally reviewed pertinent lab results  Lab Results   Component Value Date    WBC 15 77 (H) 12/28/2016    HGB 16 0 12/28/2016    HCT 46 9 12/28/2016    MCV 95 12/28/2016     12/28/2016     Lab Results   Component Value Date    CALCIUM 9 1 12/28/2016    K 4 3 12/28/2016    CO2 30 12/28/2016     12/28/2016    BUN 14 12/28/2016    CREATININE 1 12 12/28/2016     No results found for: IGE  Lab Results   Component Value Date    ALT 32 12/28/2016    AST 19 12/28/2016    ALKPHOS 99 12/28/2016       Imaging and other studies: I have personally reviewed pertinent films in PACS  Normal chest x-ray  Pulmonary function testing:   Pending      EKG, Pathology, and Other Studies: I have personally reviewed pertinent reports  [facility]

## 2022-01-20 ENCOUNTER — OFFICE VISIT (OUTPATIENT)
Dept: CARDIOLOGY CLINIC | Facility: MEDICAL CENTER | Age: 57
End: 2022-01-20
Payer: COMMERCIAL

## 2022-01-20 VITALS
WEIGHT: 179 LBS | HEIGHT: 70 IN | HEART RATE: 68 BPM | DIASTOLIC BLOOD PRESSURE: 82 MMHG | BODY MASS INDEX: 25.62 KG/M2 | OXYGEN SATURATION: 96 % | SYSTOLIC BLOOD PRESSURE: 130 MMHG

## 2022-01-20 DIAGNOSIS — R00.2 PALPITATION: ICD-10-CM

## 2022-01-20 DIAGNOSIS — U09.9 POST-COVID SYNDROME: ICD-10-CM

## 2022-01-20 DIAGNOSIS — R07.2 PRECORDIAL PAIN: Primary | ICD-10-CM

## 2022-01-20 DIAGNOSIS — I10 ESSENTIAL HYPERTENSION: ICD-10-CM

## 2022-01-20 DIAGNOSIS — E78.00 PURE HYPERCHOLESTEROLEMIA: ICD-10-CM

## 2022-01-20 DIAGNOSIS — R06.00 DYSPNEA ON EXERTION: ICD-10-CM

## 2022-01-20 PROBLEM — R06.09 DYSPNEA ON EXERTION: Status: ACTIVE | Noted: 2022-01-20

## 2022-01-20 PROCEDURE — 1036F TOBACCO NON-USER: CPT | Performed by: INTERNAL MEDICINE

## 2022-01-20 PROCEDURE — 93000 ELECTROCARDIOGRAM COMPLETE: CPT | Performed by: INTERNAL MEDICINE

## 2022-01-20 PROCEDURE — 3008F BODY MASS INDEX DOCD: CPT | Performed by: INTERNAL MEDICINE

## 2022-01-20 PROCEDURE — 3079F DIAST BP 80-89 MM HG: CPT | Performed by: INTERNAL MEDICINE

## 2022-01-20 PROCEDURE — 3075F SYST BP GE 130 - 139MM HG: CPT | Performed by: INTERNAL MEDICINE

## 2022-01-20 PROCEDURE — 99205 OFFICE O/P NEW HI 60 MIN: CPT | Performed by: INTERNAL MEDICINE

## 2022-01-20 NOTE — PROGRESS NOTES
[facility] RADHA Garland [address] [place] [id number]  Phone#  [phone]  Fax#  [phone]  [facility] Consultation             NAME: Shelly Barragan  AGE: 61 y o  SEX: male   : 1965   MRN: [id number]    DATE: 2022  TIME: 9:12 AM    Assessment and plan:    1  Precordial pain  Assessment & Plan:  Chest pain probably noncardiac in origin  Reproducible tenderness in the right upper chest   ASCVD risk is 10%, over the next 10 years  Further risk stratification planned with stress testing  Orders:  -     POCT ECG  -     Echo complete w/ contrast if indicated; Future; Expected date: 2022  -     Stress test only, exercise; Future; Expected date: 2022    2  Post-COVID syndrome  Assessment & Plan:  Worsening shortness of breath post COVID-19 infection and now symptoms have worsened further after COVID-19 booster  Pulmonary notes reviewed  Concerns for post COVID worsening reactive airway disease  PFTs have been requested  Echocardiogram advised to evaluate LV function and rule out post COVID cardiac involvement  Orders:  -     Echo complete w/ contrast if indicated; Future; Expected date: 2022    3  Pure hypercholesterolemia  Assessment & Plan:  Previous LDL was 160 about 5 years ago  LDL in 2020 was about 132  Currently on Zetia  Advised close follow-up with primary provider, consider statins if LDL still elevated  4  Essential hypertension  Assessment & Plan:  BP Readings from Last 3 Encounters:   22 124/90   21 148/95   21 144/80       Home blood pressure monitoring  Continue current medications  Lifestyle modification including increased physical activity, low-salt diet rich in fruits and vegetables, avoidance of alcohol intake and maintaining healthy weight            5  Dyspnea on exertion  Assessment & Plan:  Probably a combination of reactive airway disease and post COVID syndrome  PFTs are pending  Continue bronchodilators as tolerated  Close pulmonary follow-up is planned  Echocardiogram planned to evaluate heart function and rule out significant valvular heart disease  Orders:  -     Echo complete w/ contrast if indicated; Future; Expected date: 01/20/2022    6  Palpitation  Assessment & Plan:  He reports episodes of fast heart rate after he uses the inhalers  These can last up to 5-10 minutes  No accompanying dizziness or syncope  Most likely sinus tachycardia but cannot rule out paroxysmal atrial fibrillation  These episodes are fairly infrequent  He also reports a 2nd sensation of for flip-flop feeling in his heart that occurs very infrequently  This last only for a couple of seconds with no other accompanying symptoms  I have advised if he has more frequent recurrence than a few times a month, we can obtain an event monitor  Also if he has symptoms without taking the inhaler, we will obtain a ZIO patch  Chief Complaint   Patient presents with    New Patient Visit     patient was on ventolin for SoB  Afterwards heart rate and palpitaitons went up    Chest Pain     pt states when treatment was happening       HPI:    Joelle Alvarado is a 61y o -year-old male who presents to the cardiology clinic for evaluation  He has a history of  bronchial asthma, dyslipidemia, hypertension, diagnosed to have COVID back in April of 2021, has been referred here for evaluation of chest pain and shortness of breath  Received COVID booster last month and since then reported increasing shortness of breath requiring more frequent use of his inhaler  He was seen by Dr Hector Walker earlier this week and there were concerns for post COVID syndrome with worsening reactive airway disease  He also reported chest pain, during his last pulmonary visit which was reproducible and felt to be possibly noncardiac  He is nonsmoker  He does not have diabetes      Current symptoms:  Reports recent worsening shortness of breath  Because of this he increased his inhalers  However he developed worsening palpitations, tachycardia and chest discomfort  Subsequently stopped his inhalers and these symptoms of palpitations and tachycardia improved but his dyspnea worsen  He also reports some headaches  Reports mild swelling  Reports fatigue and arthralgias  Chest pain was felt to be reproducible with point tenderness during his last pulmonary visit  Nevertheless we discussed his underlying cardiovascular risk factors and plan further workup  He also has a history of seasonal allergies  He also reports palpitations: He reports episodes of fast heart rate after he uses the inhalers  These can last up to 5-10 minutes  No accompanying dizziness or syncope  Most likely sinus tachycardia but cannot rule out paroxysmal atrial fibrillation  These episodes are fairly infrequent  He also reports a 2nd sensation of for flip-flop feeling in his heart that occurs very infrequently  This last only for a couple of seconds with no other accompanying symptoms  Reports longstanding dyslipidemia  No family history of premature CAD  He is on Zetia but not on statins  His 10 year ASCVD risk is about 10%  He also reports history of mitral valve prolapse diagnosed over 30 years ago  He has not had echo in many years  Cardiology Problem list:  Post-COVID syndrome-COVID positive 4/21  Bronchial asthma  Chest pain:  Probably noncardiac  ASCVD risk is 10%  MVP by history  Palpitations:  May need ZIO patch    Past history, family history, social history, current medications, vital signs, recent lab and imaging studies reviewed independently on this visit      BP Readings from Last 4 Encounters:   01/20/22 130/82   01/18/22 124/90   12/21/21 148/95   08/20/21 144/80      Wt Readings from Last 3 Encounters:   01/20/22 81 2 kg (179 lb)   01/18/22 80 6 kg (177 lb 12 8 oz)   08/20/21 77 4 kg (170 lb 9 6 oz)       Lab Results   Component Value Date    LDLCALC 160 (H) 12/28/2016     Lab Results   Component Value Date    CREATININE 1 12 12/28/2016          ALLERGIES:  Allergies   Allergen Reactions    Budesonide-Formoterol Fumarate      Category: Adverse Reaction; Annotation - [id number]: Had a effluvium llike response to symbicort  He does do well with oral prednisone, fluticasone NS and short acting beta agonist (ventolin)    Rosuvastatin Myalgia    Seasonal Ic [Cholestatin]          Current Outpatient Medications:     albuterol (Ventolin HFA) 90 mcg/act inhaler, Inhale 2 puffs every 6 (six) hours as needed for wheezing or shortness of breath (cough), Disp: 18 g, Rfl: 0    diphenhydrAMINE (BENADRYL) 25 mg tablet, Take by mouth  , Disp: , Rfl:     escitalopram (LEXAPRO) 5 mg tablet, TAKE 1 TABLET BY MOUTH  DAILY, Disp: 90 tablet, Rfl: 3    ezetimibe (ZETIA) 10 mg tablet, TAKE 1 TABLET BY MOUTH  DAILY, Disp: 90 tablet, Rfl: 3    fluticasone-vilanterol (Breo Ellipta) 200-25 MCG/INH inhaler, Inhale 1 puff daily Rinse mouth after use , Disp: 28 blister, Rfl: 0    levalbuterol (Xopenex HFA) 45 mcg/act inhaler, Inhale 1-2 puffs every 4 (four) hours as needed for wheezing, Disp: 45 g, Rfl: 3    multivitamin (THERAGRAN) TABS, Take 1 tablet by mouth daily, Disp: , Rfl:     Triamcinolone Acetonide (NASACORT ALLERGY 24HR) 55 MCG/ACT AERO, into each nostril, Disp: , Rfl:     triamterene-hydrochlorothiazide (MAXZIDE-25) 37 5-25 mg per tablet, TAKE 1 TABLET BY MOUTH  DAILY, Disp: 90 tablet, Rfl: 3      Review of Systems   Constitutional: Positive for fatigue  Negative for fever and unexpected weight change  HENT: Negative for congestion and trouble swallowing  Eyes: Negative for visual disturbance  Respiratory: Positive for shortness of breath  Negative for chest tightness and wheezing  Cardiovascular: Positive for chest pain and palpitations  Negative for leg swelling     Gastrointestinal: Negative for abdominal pain and blood in stool  Endocrine: Negative for polyuria  Genitourinary: Negative for hematuria  Musculoskeletal: Negative for arthralgias and myalgias  Skin: Negative for rash  Neurological: Positive for headaches  Negative for dizziness, tremors and weakness  Hematological: Does not bruise/bleed easily  Psychiatric/Behavioral: Negative for sleep disturbance  Past Medical History:   Diagnosis Date    Asthma     Depression     Hypertension        Past Surgical History:   Procedure Laterality Date    APPENDECTOMY      VASECTOMY      VasDeferens       Family History   Problem Relation Age of Onset    Anxiety disorder Mother     Hypertension Father     Hyperlipidemia Father     Anxiety disorder Family     Heart disease Family     Stroke Family     No Known Problems Sister     No Known Problems Sister        Social History   reports that he has never smoked  He has never used smokeless tobacco  He reports current alcohol use of about 7 0 standard drinks of alcohol per week  He reports that he does not use drugs  Objective:     Vitals:    01/20/22 0841   BP: 130/82   Pulse: 68   SpO2: 96%     Wt Readings from Last 3 Encounters:   01/20/22 81 2 kg (179 lb)   01/18/22 80 6 kg (177 lb 12 8 oz)   08/20/21 77 4 kg (170 lb 9 6 oz)     Pulse Readings from Last 3 Encounters:   01/20/22 68   01/18/22 67   08/20/21 77     BP Readings from Last 3 Encounters:   01/20/22 130/82   01/18/22 124/90   12/21/21 148/95         Physical Exam  Constitutional:       General: He is not in acute distress  HENT:      Head: Normocephalic  Eyes:      Conjunctiva/sclera: Conjunctivae normal    Neck:      Vascular: No carotid bruit  Cardiovascular:      Rate and Rhythm: Normal rate and regular rhythm  Heart sounds: S1 normal and S2 normal  No murmur heard  Pulmonary:      Effort: Pulmonary effort is normal       Breath sounds: Normal breath sounds  No wheezing     Chest: Comments: Tenderness noted in the right upper intercostal area  Abdominal:      General: Bowel sounds are normal  There is no distension  Musculoskeletal:      Right lower leg: No edema  Left lower leg: No edema  Skin:     General: Skin is warm  Neurological:      General: No focal deficit present  Psychiatric:         Mood and Affect: Mood normal          Pertinent Laboratory/Diagnostic Studies:    Laboratory studies reviewed personally by Yajaira Calixto MD    BMP:   Lab Results   Component Value Date    SODIUM 139 12/28/2016    K 4 3 12/28/2016     12/28/2016    CO2 30 12/28/2016    BUN 14 12/28/2016    CREATININE 1 12 12/28/2016    GLUC 86 12/28/2016    CALCIUM 9 1 12/28/2016     CBC:  Lab Results   Component Value Date    WBC 15 77 (H) 12/28/2016    RBC 4 95 12/28/2016    HGB 16 0 12/28/2016    HCT 46 9 12/28/2016    MCV 95 12/28/2016    MCH 32 3 12/28/2016    RDW 12 8 12/28/2016     12/28/2016     Coags:    Lipid Profile:   No results found for: CHOL  Lab Results   Component Value Date    HDL 47 12/28/2016     Lab Results   Component Value Date    LDLCALC 160 (H) 12/28/2016     Lab Results   Component Value Date    TRIG 138 12/28/2016      Lab Results   Component Value Date    MEH5YUVHZWTM 1 470 12/28/2016     Lab Results   Component Value Date    ALT 32 12/28/2016    AST 19 12/28/2016         Imaging Studies:   XR chest pa & lateral    Result Date: 12/31/2021  Narrative: CHEST INDICATION:   U09 9: Post covid-19 condition, unspecified  Covid positive in April 2021  COMPARISON:  Chest radiograph from 3/14/2016  EXAM PERFORMED/VIEWS:  XR CHEST PA & LATERAL FINDINGS: Cardiomediastinal silhouette appears unremarkable  The lungs are clear  No pneumothorax or pleural effusion  Osseous structures appear within normal limits for patient age  Impression: No acute cardiopulmonary disease   Workstation performed: [id number]           Cardiac testing:   EKG reviewed personally: normal EKG, normal sinus rhythm  Orders Placed This Encounter   Procedures    Stress test only, exercise    POCT ECG    Echo complete w/ contrast if indicated           Gopal Burgess MD, [facility] of the record may have been created with voice recognition software  Occasional wrong word or "sound a like" substitutions may have occurred due to the inherent limitations of voice recognition software  Read the chart carefully and recognize, using context, where substitutions have occurred  If you have any questions or concerns about the context, text or information contained within the body of this dictation, please contact myself, the provider, for further clarification

## 2022-01-20 NOTE — ASSESSMENT & PLAN NOTE
He reports episodes of fast heart rate after he uses the inhalers  These can last up to 5-10 minutes  No accompanying dizziness or syncope  Most likely sinus tachycardia but cannot rule out paroxysmal atrial fibrillation  These episodes are fairly infrequent  He also reports a 2nd sensation of for flip-flop feeling in his heart that occurs very infrequently  This last only for a couple of seconds with no other accompanying symptoms  I have advised if he has more frequent recurrence than a few times a month, we can obtain an event monitor  Also if he has symptoms without taking the inhaler, we will obtain a ZIO patch

## 2022-01-20 NOTE — ASSESSMENT & PLAN NOTE
BP Readings from Last 3 Encounters:   01/18/22 124/90   12/21/21 148/95   08/20/21 144/80       Home blood pressure monitoring  Continue current medications  Lifestyle modification including increased physical activity, low-salt diet rich in fruits and vegetables, avoidance of alcohol intake and maintaining healthy weight

## 2022-01-20 NOTE — LETTER
2022     Leidy Liao MD  [facility] [address]    Patient: Estle Cowden   YOB: 1965   Date of Visit: 2022       Dear Dr Chase Area: Thank you for referring Aylin Dowd to me for evaluation  Below are my notes for this consultation  If you have questions, please do not hesitate to call me  I look forward to following your patient along with you  Sincerely,        Flor Villatoro MD        CC: No Recipients  Flor Villatoro MD  2022  9:13 AM  Sign when Signing Visit   [facility] [address]  Phone#  [phone]  Fax#  [phone]  [facility] Office Consultation             NAME: Estle Cowden  AGE: 61 y o  SEX: male   : 1965   MRN: [id number]    DATE: 2022  TIME: 9:12 AM    Assessment and plan:    1  Precordial pain  Assessment & Plan:  Chest pain probably noncardiac in origin  Reproducible tenderness in the right upper chest   ASCVD risk is 10%, over the next 10 years  Further risk stratification planned with stress testing  Orders:  -     POCT ECG  -     Echo complete w/ contrast if indicated; Future; Expected date: 2022  -     Stress test only, exercise; Future; Expected date: 2022    2  Post-COVID syndrome  Assessment & Plan:  Worsening shortness of breath post COVID-19 infection and now symptoms have worsened further after COVID-19 booster  Pulmonary notes reviewed  Concerns for post COVID worsening reactive airway disease  PFTs have been requested  Echocardiogram advised to evaluate LV function and rule out post COVID cardiac involvement  Orders:  -     Echo complete w/ contrast if indicated; Future; Expected date: 2022    3  Pure hypercholesterolemia  Assessment & Plan:  Previous LDL was 160 about 5 years ago  LDL in 2020 was about 132  Currently on Zetia    Advised close follow-up with primary provider, consider statins if LDL still elevated  4  Essential hypertension  Assessment & Plan:  BP Readings from Last 3 Encounters:   01/18/22 124/90   12/21/21 148/95   08/20/21 144/80       Home blood pressure monitoring  Continue current medications  Lifestyle modification including increased physical activity, low-salt diet rich in fruits and vegetables, avoidance of alcohol intake and maintaining healthy weight  5  Dyspnea on exertion  Assessment & Plan:  Probably a combination of reactive airway disease and post COVID syndrome  PFTs are pending  Continue bronchodilators as tolerated  Close pulmonary follow-up is planned  Echocardiogram planned to evaluate heart function and rule out significant valvular heart disease  Orders:  -     Echo complete w/ contrast if indicated; Future; Expected date: 01/20/2022    6  Palpitation  Assessment & Plan:  He reports episodes of fast heart rate after he uses the inhalers  These can last up to 5-10 minutes  No accompanying dizziness or syncope  Most likely sinus tachycardia but cannot rule out paroxysmal atrial fibrillation  These episodes are fairly infrequent  He also reports a 2nd sensation of for flip-flop feeling in his heart that occurs very infrequently  This last only for a couple of seconds with no other accompanying symptoms  I have advised if he has more frequent recurrence than a few times a month, we can obtain an event monitor  Also if he has symptoms without taking the inhaler, we will obtain a ZIO patch  Chief Complaint   Patient presents with    New Patient Visit     patient was on ventolin for SoB  Afterwards heart rate and palpitaitons went up    Chest Pain     pt states when treatment was happening       HPI:    Shayla Brady is a 61y o -year-old male who presents to the cardiology clinic for evaluation   He has a history of  bronchial asthma, dyslipidemia, hypertension, diagnosed to have COVID back in April of 2021, has been referred here for evaluation of chest pain and shortness of breath  Received COVID booster last month and since then reported increasing shortness of breath requiring more frequent use of his inhaler  He was seen by Dr Momo Alegria earlier this week and there were concerns for post COVID syndrome with worsening reactive airway disease  He also reported chest pain, during his last pulmonary visit which was reproducible and felt to be possibly noncardiac  He is nonsmoker  He does not have diabetes  Current symptoms:  Reports recent worsening shortness of breath  Because of this he increased his inhalers  However he developed worsening palpitations, tachycardia and chest discomfort  Subsequently stopped his inhalers and these symptoms of palpitations and tachycardia improved but his dyspnea worsen  He also reports some headaches  Reports mild swelling  Reports fatigue and arthralgias  Chest pain was felt to be reproducible with point tenderness during his last pulmonary visit  Nevertheless we discussed his underlying cardiovascular risk factors and plan further workup  He also has a history of seasonal allergies  He also reports palpitations: He reports episodes of fast heart rate after he uses the inhalers  These can last up to 5-10 minutes  No accompanying dizziness or syncope  Most likely sinus tachycardia but cannot rule out paroxysmal atrial fibrillation  These episodes are fairly infrequent  He also reports a 2nd sensation of for flip-flop feeling in his heart that occurs very infrequently  This last only for a couple of seconds with no other accompanying symptoms  Reports longstanding dyslipidemia  No family history of premature CAD  He is on Zetia but not on statins  His 10 year ASCVD risk is about 10%  He also reports history of mitral valve prolapse diagnosed over 30 years ago  He has not had echo in many years          Cardiology Problem list:  Post-COVID syndrome-COVID positive 4/21  Bronchial asthma  Chest pain:  Probably noncardiac  ASCVD risk is 10%  MVP by history  Palpitations:  May need ZIO patch    Past history, family history, social history, current medications, vital signs, recent lab and imaging studies reviewed independently on this visit  BP Readings from Last 4 Encounters:   01/20/22 130/82   01/18/22 124/90   12/21/21 148/95   08/20/21 144/80      Wt Readings from Last 3 Encounters:   01/20/22 81 2 kg (179 lb)   01/18/22 80 6 kg (177 lb 12 8 oz)   08/20/21 77 4 kg (170 lb 9 6 oz)       Lab Results   Component Value Date    LDLCALC 160 (H) 12/28/2016     Lab Results   Component Value Date    CREATININE 1 12 12/28/2016          ALLERGIES:  Allergies   Allergen Reactions    Budesonide-Formoterol Fumarate      Category: Adverse Reaction; Annotation - [id number]: Had a effluvium llike response to symbicort   He does do well with oral prednisone, fluticasone NS and short acting beta agonist (ventolin)    Rosuvastatin Myalgia    Seasonal Ic [Cholestatin]          Current Outpatient Medications:     albuterol (Ventolin HFA) 90 mcg/act inhaler, Inhale 2 puffs every 6 (six) hours as needed for wheezing or shortness of breath (cough), Disp: 18 g, Rfl: 0    diphenhydrAMINE (BENADRYL) 25 mg tablet, Take by mouth  , Disp: , Rfl:     escitalopram (LEXAPRO) 5 mg tablet, TAKE 1 TABLET BY MOUTH  DAILY, Disp: 90 tablet, Rfl: 3    ezetimibe (ZETIA) 10 mg tablet, TAKE 1 TABLET BY MOUTH  DAILY, Disp: 90 tablet, Rfl: 3    fluticasone-vilanterol (Breo Ellipta) 200-25 MCG/INH inhaler, Inhale 1 puff daily Rinse mouth after use , Disp: 28 blister, Rfl: 0    levalbuterol (Xopenex HFA) 45 mcg/act inhaler, Inhale 1-2 puffs every 4 (four) hours as needed for wheezing, Disp: 45 g, Rfl: 3    multivitamin (THERAGRAN) TABS, Take 1 tablet by mouth daily, Disp: , Rfl:     Triamcinolone Acetonide (NASACORT ALLERGY 24HR) 55 MCG/ACT AERO, into each nostril, Disp: , Rfl:     triamterene-hydrochlorothiazide (MAXZIDE-25) 37 5-25 mg per tablet, TAKE 1 TABLET BY MOUTH  DAILY, Disp: 90 tablet, Rfl: 3      Review of Systems   Constitutional: Positive for fatigue  Negative for fever and unexpected weight change  HENT: Negative for congestion and trouble swallowing  Eyes: Negative for visual disturbance  Respiratory: Positive for shortness of breath  Negative for chest tightness and wheezing  Cardiovascular: Positive for chest pain and palpitations  Negative for leg swelling  Gastrointestinal: Negative for abdominal pain and blood in stool  Endocrine: Negative for polyuria  Genitourinary: Negative for hematuria  Musculoskeletal: Negative for arthralgias and myalgias  Skin: Negative for rash  Neurological: Positive for headaches  Negative for dizziness, tremors and weakness  Hematological: Does not bruise/bleed easily  Psychiatric/Behavioral: Negative for sleep disturbance  Past Medical History:   Diagnosis Date    Asthma     Depression     Hypertension        Past Surgical History:   Procedure Laterality Date    APPENDECTOMY      VASECTOMY      VasDeferens       Family History   Problem Relation Age of Onset    Anxiety disorder Mother     Hypertension Father     Hyperlipidemia Father     Anxiety disorder Family     Heart disease Family     Stroke Family     No Known Problems Sister     No Known Problems Sister        Social History   reports that he has never smoked  He has never used smokeless tobacco  He reports current alcohol use of about 7 0 standard drinks of alcohol per week  He reports that he does not use drugs         Objective:     Vitals:    01/20/22 0841   BP: 130/82   Pulse: 68   SpO2: 96%     Wt Readings from Last 3 Encounters:   01/20/22 81 2 kg (179 lb)   01/18/22 80 6 kg (177 lb 12 8 oz)   08/20/21 77 4 kg (170 lb 9 6 oz)     Pulse Readings from Last 3 Encounters:   01/20/22 68   01/18/22 67   08/20/21 77     BP Readings from Last 3 Encounters:   01/20/22 130/82   01/18/22 124/90   12/21/21 148/95         Physical Exam  Constitutional:       General: He is not in acute distress  HENT:      Head: Normocephalic  Eyes:      Conjunctiva/sclera: Conjunctivae normal    Neck:      Vascular: No carotid bruit  Cardiovascular:      Rate and Rhythm: Normal rate and regular rhythm  Heart sounds: S1 normal and S2 normal  No murmur heard  Pulmonary:      Effort: Pulmonary effort is normal       Breath sounds: Normal breath sounds  No wheezing  Chest:      Comments: Tenderness noted in the right upper intercostal area  Abdominal:      General: Bowel sounds are normal  There is no distension  Musculoskeletal:      Right lower leg: No edema  Left lower leg: No edema  Skin:     General: Skin is warm  Neurological:      General: No focal deficit present     Psychiatric:         Mood and Affect: Mood normal          Pertinent Laboratory/Diagnostic Studies:    Laboratory studies reviewed personally by Alexandra Moss MD    BMP:   Lab Results   Component Value Date    SODIUM 139 12/28/2016    K 4 3 12/28/2016     12/28/2016    CO2 30 12/28/2016    BUN 14 12/28/2016    CREATININE 1 12 12/28/2016    GLUC 86 12/28/2016    CALCIUM 9 1 12/28/2016     CBC:  Lab Results   Component Value Date    WBC 15 77 (H) 12/28/2016    RBC 4 95 12/28/2016    HGB 16 0 12/28/2016    HCT 46 9 12/28/2016    MCV 95 12/28/2016    MCH 32 3 12/28/2016    RDW 12 8 12/28/2016     12/28/2016     Coags:    Lipid Profile:   No results found for: CHOL  Lab Results   Component Value Date    HDL 47 12/28/2016     Lab Results   Component Value Date    LDLCALC 160 (H) 12/28/2016     Lab Results   Component Value Date    TRIG 138 12/28/2016      Lab Results   Component Value Date    [id number] 1 470 12/28/2016     Lab Results   Component Value Date    ALT 32 12/28/2016    AST 19 12/28/2016         Imaging Studies:   XR chest pa & lateral    Result Date: 12/31/2021  Narrative: CHEST INDICATION:   U09 9: Post covid-19 condition, unspecified  Covid positive in April 2021  COMPARISON:  Chest radiograph from 3/14/2016  EXAM PERFORMED/VIEWS:  XR CHEST PA & LATERAL FINDINGS: Cardiomediastinal silhouette appears unremarkable  The lungs are clear  No pneumothorax or pleural effusion  Osseous structures appear within normal limits for patient age  Impression: No acute cardiopulmonary disease  Workstation performed: [id number]           Cardiac testing:   EKG reviewed personally: normal EKG, normal sinus rhythm  Orders Placed This Encounter   Procedures    Stress test only, exercise    POCT ECG    Echo complete w/ contrast if indicated           Garrett Miranda MD, [facility] of the record may have been created with voice recognition software  Occasional wrong word or "sound a like" substitutions may have occurred due to the inherent limitations of voice recognition software  Read the chart carefully and recognize, using context, where substitutions have occurred  If you have any questions or concerns about the context, text or information contained within the body of this dictation, please contact myself, the provider, for further clarification

## 2022-01-20 NOTE — ASSESSMENT & PLAN NOTE
Chest pain probably noncardiac in origin  Reproducible tenderness in the right upper chest   ASCVD risk is 10%, over the next 10 years  Further risk stratification planned with stress testing

## 2022-01-20 NOTE — ASSESSMENT & PLAN NOTE
Previous LDL was 160 about 5 years ago  LDL in 2020 was about 132  Currently on Zetia  Advised close follow-up with primary provider, consider statins if LDL still elevated

## 2022-01-20 NOTE — ASSESSMENT & PLAN NOTE
Probably a combination of reactive airway disease and post COVID syndrome  PFTs are pending  Continue bronchodilators as tolerated  Close pulmonary follow-up is planned  Echocardiogram planned to evaluate heart function and rule out significant valvular heart disease

## 2022-01-20 NOTE — ASSESSMENT & PLAN NOTE
Worsening shortness of breath post COVID-19 infection and now symptoms have worsened further after COVID-19 booster  Pulmonary notes reviewed  Concerns for post COVID worsening reactive airway disease  PFTs have been requested  Echocardiogram advised to evaluate LV function and rule out post COVID cardiac involvement

## 2022-01-20 NOTE — PATIENT INSTRUCTIONS
Echocardiogram planned to evaluate heart function and rule out significant valvular heart disease  Stress testing planned for further cardiac risk assessment  Lipids with Dr Mark Vargas later this year  Consider statins if LDL still over 130  Continue current  medications  Continue modification of cardiac risk factors including control of blood glucose, cholesterol, blood pressure and body weight  Cardiac risk can be lowered with increase in physical activity, plant-based or Mediterranean style start diet, and avoidance of tobacco products  Report any worsening cardiac symptoms (chest pain,shortness of breath with exertion or fainting)  Keep follow-up as planned with primary care and other specialists  [address] diet: A heart-healthy eating plan    What is the Mediterranean diet? The Mediterranean diet is a way of eating based on the traditional cuisine of countries bordering the [facility]  While there is no single definition of the Mediterranean diet, it is typically high in vegetables, fruits, whole grains, beans, nut and seeds, and olive oil  The main components of Mediterranean diet include:    Daily consumption of vegetables, fruits, whole grains and healthy fats  Weekly intake of fish, poultry, beans and eggs  Moderate portions of dairy products  Limited intake of red meat  Other important elements of the Mediterranean diet are sharing meals with family and friends    Plant based, not meat based  The foundation of the Mediterranean diet is vegetables, fruits, herbs, nuts, beans and whole grains  Meals are built around these plant-based foods  Small amounts of dairy, poultry and eggs are also central to the Mediterranean Diet, as is seafood  In contrast, red meat is eaten only occasionally  Healthy fats  Healthy fats are a mainstay of the Mediterranean diet   They're eaten instead of less healthy fats, such as saturated and trans fats, which contribute to heart disease  Olive oil is the primary source of added fat in the Mediterranean diet  Olive oil provides monounsaturated fat, which has been found to lower total cholesterol and low-density lipoprotein (LDL or "bad") cholesterol levels  Nuts and seeds also contain monounsaturated fat  Fish are also important in the [address]  Fatty fish -- such as mackerel, herring, sardines, albacore tuna, salmon and lake trout -- are rich in omega-3 fatty acids, a type of polyunsaturated fat that may reduce inflammation in the body  Omega-3 fatty acids also help decrease triglycerides, reduce blood clotting, and decrease the risk of stroke and heart failure  Eating the [address] [facility] [address]  Interested in trying the [address]? These tips will help you get started:    Eat more fruits and vegetables  Aim for 7 to 10 servings a day of fruit and vegetables  Opt for whole grains  Switch to whole-grain bread, cereal and pasta  [place] with other whole grains  Use healthy fats  Try olive oil as a replacement for butter when cooking  Instead of putting butter or margarine on bread, try dipping it in flavored olive oil  Eat more seafood  Eat fish twice a week  Fresh or water-packed tuna, salmon, trout, mackerel and herring are healthy choices  Grilled fish tastes good and requires little cleanup  Avoid deep-fried fish  Reduce red meat  Substitute fish, poultry or beans for meat  If you eat meat, make sure it's lean and keep portions small  Spice it up  Herbs and spices boost flavor and lessen the need for salt  The Mediterranean diet is a delicious and healthy way to eat  Many people who switch to this style of eating say they'll never eat any other way      Source: http://www liudmila-villa org/

## 2022-01-21 ENCOUNTER — TELEPHONE (OUTPATIENT)
Dept: PULMONOLOGY | Facility: CLINIC | Age: 57
End: 2022-01-21

## 2022-01-21 DIAGNOSIS — U09.9 POST-COVID SYNDROME: ICD-10-CM

## 2022-01-21 NOTE — TELEPHONE ENCOUNTER
Will need prior auth, I did put in my note that he has tried and failed albuterol due to palpitations

## 2022-01-24 NOTE — TELEPHONE ENCOUNTER
Prior auth for levalbuterol started, pt aware  Will contact with determination  Pt also stated Wenceslao Lorenzo is working for him and he has not had any adverse affect  Pt would like script sent to [address]

## 2022-02-02 ENCOUNTER — HOSPITAL ENCOUNTER (OUTPATIENT)
Dept: NON INVASIVE DIAGNOSTICS | Facility: MEDICAL CENTER | Age: 57
Discharge: HOME | End: 2022-02-02
Payer: COMMERCIAL

## 2022-02-02 VITALS
SYSTOLIC BLOOD PRESSURE: 130 MMHG | WEIGHT: 179 LBS | HEART RATE: 61 BPM | DIASTOLIC BLOOD PRESSURE: 82 MMHG | HEIGHT: 70 IN | BODY MASS INDEX: 25.62 KG/M2

## 2022-02-02 DIAGNOSIS — R07.2 PRECORDIAL PAIN: ICD-10-CM

## 2022-02-02 DIAGNOSIS — U09.9 POST-COVID SYNDROME: ICD-10-CM

## 2022-02-02 DIAGNOSIS — R06.00 DYSPNEA ON EXERTION: ICD-10-CM

## 2022-02-02 LAB
AORTIC ROOT: 2.7 CM
APICAL FOUR CHAMBER EJECTION FRACTION: 60 %
ASCENDING AORTA: 3 CM (ref 2.03–3.04)
E WAVE DECELERATION TIME: 237 MS
FRACTIONAL SHORTENING: 34 % (ref 28–44)
INTERVENTRICULAR SEPTUM IN DIASTOLE (PARASTERNAL SHORT AXIS VIEW): 0.8 CM (ref 0.53–1)
LEFT ATRIUM AREA SYSTOLE SINGLE PLANE A4C: 13.2 CM2
LEFT ATRIUM SIZE: 2.8 CM
LEFT INTERNAL DIMENSION IN SYSTOLE: 2.7 CM (ref 2.1–4)
LEFT VENTRICULAR INTERNAL DIMENSION IN DIASTOLE: 4.1 CM (ref 4.83–7.19)
LEFT VENTRICULAR POSTERIOR WALL IN END DIASTOLE: 0.8 CM (ref 0.52–0.99)
LEFT VENTRICULAR STROKE VOLUME: 47 ML
MV E'TISSUE VEL-SEP: 9 CM/S
MV PEAK A VEL: 0.64 M/S
MV PEAK E VEL: 65 CM/S
MV STENOSIS PRESSURE HALF TIME: 0 MS
RIGHT ATRIUM AREA SYSTOLE A4C: 9.8 CM2
RIGHT VENTRICLE ID DIMENSION: 3.2 CM
SL CV LV EF: 60
SL CV PED ECHO LEFT VENTRICLE DIASTOLIC VOLUME (MOD BIPLANE) 2D: 73 ML
SL CV PED ECHO LEFT VENTRICLE SYSTOLIC VOLUME (MOD BIPLANE) 2D: 26 ML
Z-SCORE OF ASCENDING AORTA: 1.81
Z-SCORE OF INTERVENTRICULAR SEPTUM IN END DIASTOLE: 0.28
Z-SCORE OF LEFT VENTRICULAR DIMENSION IN END SYSTOLE: -3.62
Z-SCORE OF LEFT VENTRICULAR POSTERIOR WALL IN END DIASTOLE: 0.39

## 2022-02-02 PROCEDURE — 93306 TTE W/DOPPLER COMPLETE: CPT | Performed by: INTERNAL MEDICINE

## 2022-02-02 PROCEDURE — 93306 TTE W/DOPPLER COMPLETE: CPT

## 2022-02-02 NOTE — RESULT ENCOUNTER NOTE
ECHO reviewed:   Normal pumping strength of the heart muscle  Valves: No serious abnormalities seen  No fluid seen around the heart

## 2022-02-03 ENCOUNTER — HOSPITAL ENCOUNTER (OUTPATIENT)
Dept: PULMONOLOGY | Facility: HOSPITAL | Age: 57
Discharge: HOME | End: 2022-02-03
Attending: INTERNAL MEDICINE
Payer: COMMERCIAL

## 2022-02-03 DIAGNOSIS — U09.9 POST-COVID SYNDROME: ICD-10-CM

## 2022-02-03 PROCEDURE — 94060 EVALUATION OF WHEEZING: CPT

## 2022-02-03 PROCEDURE — 94729 DIFFUSING CAPACITY: CPT

## 2022-02-03 PROCEDURE — 94760 N-INVAS EAR/PLS OXIMETRY 1: CPT

## 2022-02-03 PROCEDURE — 94729 DIFFUSING CAPACITY: CPT | Performed by: INTERNAL MEDICINE

## 2022-02-03 PROCEDURE — 94726 PLETHYSMOGRAPHY LUNG VOLUMES: CPT

## 2022-02-03 PROCEDURE — 94060 EVALUATION OF WHEEZING: CPT | Performed by: INTERNAL MEDICINE

## 2022-02-03 PROCEDURE — 94726 PLETHYSMOGRAPHY LUNG VOLUMES: CPT | Performed by: INTERNAL MEDICINE

## 2022-02-03 RX ORDER — LEVALBUTEROL INHALATION SOLUTION 0.63 MG/3ML
0.63 SOLUTION RESPIRATORY (INHALATION) ONCE
Status: COMPLETED | OUTPATIENT
Start: 2022-02-03 | End: 2022-02-03

## 2022-02-03 RX ADMIN — LEVALBUTEROL INHALATION SOLUTION 0.63 MG: 0.63 SOLUTION RESPIRATORY (INHALATION) at 07:40

## 2022-03-21 ENCOUNTER — OFFICE VISIT (OUTPATIENT)
Dept: PULMONOLOGY | Facility: CLINIC | Age: 57
End: 2022-03-21
Payer: COMMERCIAL

## 2022-03-21 ENCOUNTER — HOSPITAL ENCOUNTER (OUTPATIENT)
Dept: NON INVASIVE DIAGNOSTICS | Facility: CLINIC | Age: 57
Discharge: HOME | End: 2022-03-21
Payer: COMMERCIAL

## 2022-03-21 VITALS
HEIGHT: 70 IN | HEART RATE: 86 BPM | RESPIRATION RATE: 16 BRPM | WEIGHT: 175 LBS | OXYGEN SATURATION: 97 % | TEMPERATURE: 98.8 F | BODY MASS INDEX: 25.05 KG/M2 | SYSTOLIC BLOOD PRESSURE: 144 MMHG | DIASTOLIC BLOOD PRESSURE: 96 MMHG

## 2022-03-21 DIAGNOSIS — J45.20 MILD INTERMITTENT ASTHMA, UNSPECIFIED WHETHER COMPLICATED: ICD-10-CM

## 2022-03-21 DIAGNOSIS — R07.2 PRECORDIAL PAIN: ICD-10-CM

## 2022-03-21 DIAGNOSIS — J98.4 RESTRICTIVE LUNG DISEASE: Primary | ICD-10-CM

## 2022-03-21 LAB
BASELINE ST DEPRESSION: 0 MM
MAX HR PERCENT: 90 %
RATE PRESSURE PRODUCT: NORMAL
SL CV STRESS RECOVERY BP: NORMAL MMHG
SL CV STRESS RECOVERY HR: 93 BPM
SL CV STRESS RECOVERY O2 SAT: 98 %
SL CV STRESS STAGE REACHED: 3
STRESS ANGINA INDEX: 0
STRESS BASELINE BP: NORMAL MMHG
STRESS BASELINE HR: 73 BPM
STRESS DUKE TREADMILL SCORE: 8
STRESS O2 SAT REST: 98 %
STRESS PEAK HR: 148 BPM
STRESS PERCENT HR: 90 %
STRESS POST ESTIMATED WORKLOAD: 10.1 METS
STRESS POST EXERCISE DUR MIN: 8 MIN
STRESS POST O2 SAT PEAK: 96 %
STRESS POST PEAK BP: 170 MMHG
STRESS ST DEPRESSION: 0 MM
STRESS TARGET HR: 148 BPM

## 2022-03-21 PROCEDURE — 3008F BODY MASS INDEX DOCD: CPT | Performed by: INTERNAL MEDICINE

## 2022-03-21 PROCEDURE — 3080F DIAST BP >= 90 MM HG: CPT | Performed by: INTERNAL MEDICINE

## 2022-03-21 PROCEDURE — 99214 OFFICE O/P EST MOD 30 MIN: CPT | Performed by: INTERNAL MEDICINE

## 2022-03-21 PROCEDURE — 93016 CV STRESS TEST SUPVJ ONLY: CPT | Performed by: INTERNAL MEDICINE

## 2022-03-21 PROCEDURE — 1036F TOBACCO NON-USER: CPT | Performed by: INTERNAL MEDICINE

## 2022-03-21 PROCEDURE — 93017 CV STRESS TEST TRACING ONLY: CPT

## 2022-03-21 PROCEDURE — 93018 CV STRESS TEST I&R ONLY: CPT | Performed by: INTERNAL MEDICINE

## 2022-03-21 PROCEDURE — 3077F SYST BP >= 140 MM HG: CPT | Performed by: INTERNAL MEDICINE

## 2022-03-21 NOTE — PROGRESS NOTES
Pulmonary Follow Up Note   Joycelyn Jewell 61 y o  male MRN: [id number]  3/21/2022    Assessment:    Restrictive lung disease  This was an unexpected finding on carlos's pulmonary function tests  I am more inclined to consider this a testing error than a true finding, but given that he has not fully recovered to baseline, we will proceed high-resolution CT scan if he is not improved back to normal by his next visit  Mild intermittent asthma  Will plan to continue him on Breo at least until the next appointment  May try to deescalate the strength of the Breo versus reduce to Arnuity if his symptoms are well controlled  Continue Xopenex as ordered  Plan:    Diagnoses and all orders for this visit:    Restrictive lung disease    Mild intermittent asthma, unspecified whether complicated   - continue Xopenex and Breo    Return in about 3 months (around 6/21/2022)  History of Present Illness   HPI:  Joycelyn Jewell is a 61 y o  male who presents for follow-up  He reports feeling dramatically better since the initiation of Breo  He does not report however that he is back to normal   He still has issues with some cough and shortness of breath  He has good relief with the Xopenex, and it does not cause the same palpitations that were caused by the albuterol  He reports he is only intermittently requiring this  Within the past week or two, he has developed a common cold which has increased his requirements for rescue medications and increased his coughing  He is just now beginning to recover from this      Answers for HPI/ROS submitted by the patient on 3/20/2022  Do you have difficulty breathing?: Yes  Chronicity: chronic  When did you first notice your symptoms?: more than 1 month ago  How often do your symptoms occur?: every several days  Since you first noticed this problem, how has it changed?: gradually improving  Do you have fatigue?: Yes  Which of the following makes your symptoms better?: steroid inhaler    Review of Systems   Constitutional: Positive for appetite change  HENT: Positive for postnasal drip  Respiratory: Positive for wheezing  Musculoskeletal: Positive for myalgias  All other systems reviewed and are negative  Historical Information   Past Medical History:   Diagnosis Date    Asthma     Depression     Hypertension      Past Surgical History:   Procedure Laterality Date    APPENDECTOMY      VASECTOMY      VasDeferens     Family History   Problem Relation Age of Onset    Anxiety disorder Mother     Hypertension Father     Hyperlipidemia Father     Anxiety disorder Family     Heart disease Family     Stroke Family     No Known Problems Sister     No Known Problems Sister        Meds/Allergies     Current Outpatient Medications:     diphenhydrAMINE (BENADRYL) 25 mg tablet, Take by mouth  , Disp: , Rfl:     escitalopram (LEXAPRO) 5 mg tablet, TAKE 1 TABLET BY MOUTH  DAILY, Disp: 90 tablet, Rfl: 3    ezetimibe (ZETIA) 10 mg tablet, TAKE 1 TABLET BY MOUTH  DAILY, Disp: 90 tablet, Rfl: 3    fluticasone-vilanterol (Breo Ellipta) 200-25 MCG/INH inhaler, Inhale 1 puff daily Rinse mouth after use , Disp: 180 blister, Rfl: 3    levalbuterol (Xopenex HFA) 45 mcg/act inhaler, Inhale 1-2 puffs every 4 (four) hours as needed for wheezing, Disp: 45 g, Rfl: 3    multivitamin (THERAGRAN) TABS, Take 1 tablet by mouth daily, Disp: , Rfl:     Triamcinolone Acetonide (NASACORT ALLERGY 24HR) 55 MCG/ACT AERO, into each nostril, Disp: , Rfl:     triamterene-hydrochlorothiazide (MAXZIDE-25) 37 5-25 mg per tablet, TAKE 1 TABLET BY MOUTH  DAILY, Disp: 90 tablet, Rfl: 3  Allergies   Allergen Reactions    Budesonide-Formoterol Fumarate      Category: Adverse Reaction; Annotation - [id number]: Had a effluvium llike response to symbicort   He does do well with oral prednisone, fluticasone NS and short acting beta agonist (ventolin)    Rosuvastatin Myalgia    Seasonal Ic [Cholestatin] Vitals: Blood pressure 144/96, pulse 86, temperature 98 8 °F (37 1 °C), temperature source Tympanic, resp  rate 16, height 5' 10" (1 778 m), weight 79 4 kg (175 lb), SpO2 97 %  Body mass index is 25 11 kg/m²  Oxygen Therapy  SpO2: 97 %  Oxygen Therapy: None (Room air)    Physical Exam  Physical Exam  Vitals reviewed  Constitutional:       General: He is not in acute distress  Appearance: Normal appearance  He is well-developed  He is not ill-appearing  HENT:      Head: Normocephalic and atraumatic  Eyes:      General: No scleral icterus  Conjunctiva/sclera: Conjunctivae normal    Neck:      Thyroid: No thyromegaly  Vascular: No JVD  Cardiovascular:      Rate and Rhythm: Normal rate and regular rhythm  Heart sounds: Normal heart sounds  No murmur heard  No friction rub  No gallop  Pulmonary:      Effort: Pulmonary effort is normal  No respiratory distress  Breath sounds: Normal breath sounds  No wheezing or rales  Musculoskeletal:      Cervical back: Neck supple  Right lower leg: No edema  Left lower leg: No edema  Skin:     General: Skin is warm and dry  Findings: No rash  Neurological:      General: No focal deficit present  Mental Status: He is alert and oriented to person, place, and time  Mental status is at baseline  Psychiatric:         Mood and Affect: Mood normal          Behavior: Behavior normal          Labs: I have personally reviewed pertinent lab results    Lab Results   Component Value Date    WBC 15 77 (H) 12/28/2016    HGB 16 0 12/28/2016    HCT 46 9 12/28/2016    MCV 95 12/28/2016     12/28/2016     Lab Results   Component Value Date    CALCIUM 9 1 12/28/2016    K 4 3 12/28/2016    CO2 30 12/28/2016     12/28/2016    BUN 14 12/28/2016    CREATININE 1 12 12/28/2016     No results found for: IGE  Lab Results   Component Value Date    ALT 32 12/28/2016    AST 19 12/28/2016    ALKPHOS 99 12/28/2016     Imaging and other studies: I have personally reviewed pertinent films in PACS    EKG, Pathology, and Other Studies: I have personally reviewed pertinent reports  Euna Goodpasture, M D [facility]

## 2022-03-21 NOTE — ASSESSMENT & PLAN NOTE
Will plan to continue him on Breo at least until the next appointment  May try to deescalate the strength of the Breo versus reduce to Arnuity if his symptoms are well controlled  Continue Xopenex as ordered

## 2022-03-21 NOTE — ASSESSMENT & PLAN NOTE
This was an unexpected finding on carlos's pulmonary function tests  I am more inclined to consider this a testing error than a true finding, but given that he has not fully recovered to baseline, we will proceed high-resolution CT scan if he is not improved back to normal by his next visit

## 2022-03-23 LAB
CHEST PAIN STATEMENT: NORMAL
MAX DIASTOLIC BP: 72 MMHG
MAX HEART RATE: 148 BPM
MAX PREDICTED HEART RATE: 163 BPM
MAX. SYSTOLIC BP: 170 MMHG
PROTOCOL NAME: NORMAL
REASON FOR TERMINATION: NORMAL
TARGET HR FORMULA: NORMAL
TEST INDICATION: NORMAL
TIME IN EXERCISE PHASE: NORMAL

## 2022-03-23 NOTE — RESULT ENCOUNTER NOTE
Low risk stress test:  Normal EKG response to stress  Good exercise capacity  Low likelihood of significant blockages in the heart arteries    Good prognosis based on this test   Please call us for any questions about this test    Please call patient with normal results on the stress test

## 2022-04-28 ENCOUNTER — OFFICE VISIT (OUTPATIENT)
Dept: CARDIOLOGY CLINIC | Facility: MEDICAL CENTER | Age: 57
End: 2022-04-28
Payer: COMMERCIAL

## 2022-04-28 VITALS
SYSTOLIC BLOOD PRESSURE: 130 MMHG | OXYGEN SATURATION: 97 % | BODY MASS INDEX: 25.05 KG/M2 | WEIGHT: 175 LBS | HEIGHT: 70 IN | DIASTOLIC BLOOD PRESSURE: 82 MMHG | HEART RATE: 75 BPM

## 2022-04-28 DIAGNOSIS — R00.2 PALPITATION: Primary | ICD-10-CM

## 2022-04-28 DIAGNOSIS — E78.00 PURE HYPERCHOLESTEROLEMIA: ICD-10-CM

## 2022-04-28 DIAGNOSIS — I10 ESSENTIAL HYPERTENSION: ICD-10-CM

## 2022-04-28 DIAGNOSIS — R07.2 PRECORDIAL PAIN: ICD-10-CM

## 2022-04-28 PROCEDURE — 3008F BODY MASS INDEX DOCD: CPT | Performed by: INTERNAL MEDICINE

## 2022-04-28 PROCEDURE — 3079F DIAST BP 80-89 MM HG: CPT | Performed by: INTERNAL MEDICINE

## 2022-04-28 PROCEDURE — 1036F TOBACCO NON-USER: CPT | Performed by: INTERNAL MEDICINE

## 2022-04-28 PROCEDURE — 99214 OFFICE O/P EST MOD 30 MIN: CPT | Performed by: INTERNAL MEDICINE

## 2022-04-28 PROCEDURE — 3075F SYST BP GE 130 - 139MM HG: CPT | Performed by: INTERNAL MEDICINE

## 2022-04-28 NOTE — PATIENT INSTRUCTIONS
Recent echo and stress test were normal   Report any worsening chest pain or palpitations  Continue current cardiac medications  Continue modification of cardiac risk factors including control of blood glucose, cholesterol, blood pressure and body weight  Cardiac risk can be lowered with increase in physical activity, plant-based or Mediterranean style start diet, and avoidance of tobacco products  Report any worsening cardiac symptoms (chest pain,shortness of breath with exertion or fainting)  Keep follow-up as planned with primary care and other specialists  [address] diet: A heart-healthy eating plan    What is the Mediterranean diet? The Mediterranean diet is a way of eating based on the traditional cuisine of countries bordering [facility]  While there is no single definition of the Mediterranean diet, it is typically high in vegetables, fruits, whole grains, beans, nut and seeds, and olive oil  The main components of Mediterranean diet include:    Daily consumption of vegetables, fruits, whole grains and healthy fats  Weekly intake of fish, poultry, beans and eggs  Moderate portions of dairy products  Limited intake of red meat  Other important elements of the Mediterranean diet are sharing meals with family and friends    Plant based, not meat based  The foundation of the Mediterranean diet is vegetables, fruits, herbs, nuts, beans and whole grains  Meals are built around these plant-based foods  Small amounts of dairy, poultry and eggs are also central to the Mediterranean Diet, as is seafood  In contrast, red meat is eaten only occasionally  Healthy fats  Healthy fats are a mainstay of the Mediterranean diet  They're eaten instead of less healthy fats, such as saturated and trans fats, which contribute to heart disease  Olive oil is the primary source of added fat in the Mediterranean diet   Olive oil provides monounsaturated fat, which has been found to lower total cholesterol and low-density lipoprotein (LDL or "bad") cholesterol levels  Nuts and seeds also contain monounsaturated fat  Fish are also important in the [address]  Fatty fish -- such as mackerel, herring, sardines, albacore tuna, salmon and lake trout -- are rich in omega-3 fatty acids, a type of polyunsaturated fat that may reduce inflammation in the body  Omega-3 fatty acids also help decrease triglycerides, reduce blood clotting, and decrease the risk of stroke and heart failure  Eating the [address]  Interested in trying the [address]? These tips will help you get started:    Eat more fruits and vegetables  Aim for 7 to 10 servings a day of fruit and vegetables  Opt for whole grains  Switch to whole-grain bread, cereal and pasta  Brandt with other whole grains  Use healthy fats  Try olive oil as a replacement for butter when cooking  Instead of putting butter or margarine on bread, try dipping it in flavored olive oil  Eat more seafood  Eat fish twice a week  Fresh or water-packed tuna, salmon, trout, mackerel and herring are healthy choices  Grilled fish tastes good and requires little cleanup  Avoid deep-fried fish  Reduce red meat  Substitute fish, poultry or beans for meat  If you eat meat, make sure it's lean and keep portions small  Spice it up  Herbs and spices boost flavor and lessen the need for salt  The Mediterranean diet is a delicious and healthy way to eat  Many people who switch to this style of eating say they'll never eat any other way      Source: http://www [employer] org/

## 2022-04-28 NOTE — ASSESSMENT & PLAN NOTE
Previously he had reported tachycardia after use of bronchodilators  Most likely sinus tachycardia but cannot rule out paroxysmal atrial fibrillation  Symptoms now resolved  Defer further tests for now  If he has more frequent recurrence than a few times a month, we can obtain an event monitor

## 2022-04-28 NOTE — PROGRESS NOTES
[facility] [address] [facility] [address] [address]  Phone#  [phone]  Fax#  [phone]  [facility] Cardiology Office Follow-up Visit             NAME: Lore Espinal  AGE: 61 y o  SEX: male   : 1965   MRN: [id number]    DATE: 2022  TIME: 1:00 PM    Assessment and plan:    Precordial pain  Chest pain felt to be noncardiac  Echo with no structural heart disease  Recent stress test showed no ischemic changes at 8 minutes with no clinical angina  Continue monitoring for worsening symptoms and risk factor modification  Palpitation  Previously he had reported tachycardia after use of bronchodilators  Most likely sinus tachycardia but cannot rule out paroxysmal atrial fibrillation  Symptoms now resolved  Defer further tests for now  If he has more frequent recurrence than a few times a month, we can obtain an event monitor  Essential hypertension  BP Readings from Last 3 Encounters:   22 130/82   22 144/96   22 130/82       Advised home blood pressure monitoring  Continue current medications  Lifestyle modification measures to help blood pressure control include:increased physical activity, low-salt diet rich in fruits and vegetables, avoidance of alcohol intake and maintaining healthy weight  Post-COVID syndrome  Improved now  Pure hypercholesterolemia  Managed by Dr Janae Calderon  Chief Complaint   Patient presents with    Follow-up     6 month f/up       HPI:    Lore Espinal is a 61y o -year-old male who presents to the cardiology clinic for follow up  Last evaluated in  with complaints of chest pain and post COVID syndrome  Echo showed no structural heart disease  Stress test was normal at 8 minutes with no ischemia or angina  He had reported palpitations as well and we had considered performing a event monitor    At the last visit he had reported exertional dyspnea for which she was on bronchodilators  He now comes in for a follow-up visit  He has no angina or dyspnea  Overall feeling better since last visit  Blood pressure is well controlled  Breo really helped dyspnea  No albuterol use  Dr Jaspal Patrick checks limits  No exertional angina  Cardiology Problem list:  Post-COVID syndrome-COVID positive 4/21 2/22:  Echo-EF 60, normal systolic and diastolic function  Valves normal   Bronchial asthma  Chest pain:  Probably noncardiac  ASCVD risk is 10%  Stress test 3/22- normal at 8 min  MVP by history  Palpitations:  May need ZIO patch    Past history, family history, social history, current medications, vital signs, recent lab and imaging studies and  prior cardiology studies reviewed independently on this visit  BP Readings from Last 4 Encounters:   04/28/22 130/82   03/21/22 144/96   02/02/22 130/82   01/20/22 130/82      Wt Readings from Last 3 Encounters:   04/28/22 79 4 kg (175 lb)   03/21/22 79 4 kg (175 lb)   02/02/22 81 2 kg (179 lb)       Lab Results   Component Value Date    LDLCALC 160 (H) 12/28/2016     Lab Results   Component Value Date    CREATININE 1 12 12/28/2016        ALLERGIES:  Allergies   Allergen Reactions    Budesonide-Formoterol Fumarate      Category: Adverse Reaction; Annotation - [id number]: Had a effluvium llike response to symbicort   He does do well with oral prednisone, fluticasone NS and short acting beta agonist (ventolin)    Rosuvastatin Myalgia    Seasonal Ic [Cholestatin]          Current Outpatient Medications:     diphenhydrAMINE (BENADRYL) 25 mg tablet, Take by mouth  , Disp: , Rfl:     escitalopram (LEXAPRO) 5 mg tablet, TAKE 1 TABLET BY MOUTH  DAILY, Disp: 90 tablet, Rfl: 3    ezetimibe (ZETIA) 10 mg tablet, TAKE 1 TABLET BY MOUTH  DAILY, Disp: 90 tablet, Rfl: 3    fluticasone-vilanterol (Breo Ellipta) 200-25 MCG/INH inhaler, Inhale 1 puff daily Rinse mouth after use , Disp: 180 blister, Rfl: 3    levalbuterol (Xopenex HFA) 45 mcg/act inhaler, Inhale 1-2 puffs every 4 (four) hours as needed for wheezing, Disp: 45 g, Rfl: 3    multivitamin (THERAGRAN) TABS, Take 1 tablet by mouth daily, Disp: , Rfl:     Triamcinolone Acetonide (NASACORT ALLERGY 24HR) 55 MCG/ACT AERO, into each nostril, Disp: , Rfl:     triamterene-hydrochlorothiazide (MAXZIDE-25) 37 5-25 mg per tablet, TAKE 1 TABLET BY MOUTH  DAILY, Disp: 90 tablet, Rfl: 3    Review of Systems   Constitutional: Negative for fatigue  Respiratory: Negative for chest tightness, shortness of breath and wheezing  Cardiovascular: Negative for chest pain, palpitations and leg swelling  Skin: Negative for rash  Neurological: Negative for syncope  Hematological: Does not bruise/bleed easily  Past Medical History:   Diagnosis Date    Asthma     Depression     Hypertension      Past Surgical History:   Procedure Laterality Date    APPENDECTOMY      VASECTOMY      VasDeferens     Family History   Problem Relation Age of Onset    Anxiety disorder Mother     Hypertension Father     Hyperlipidemia Father     Anxiety disorder Family     Heart disease Family     Stroke Family     No Known Problems Sister     No Known Problems Sister        Social History   reports that he has never smoked  He has never used smokeless tobacco  He reports current alcohol use of about 7 0 standard drinks of alcohol per week  He reports that he does not use drugs  Objective:     Vitals:    04/28/22 1247   BP: 130/82   Pulse: 75   SpO2: 97%     Wt Readings from Last 3 Encounters:   04/28/22 79 4 kg (175 lb)   03/21/22 79 4 kg (175 lb)   02/02/22 81 2 kg (179 lb)     Pulse Readings from Last 3 Encounters:   04/28/22 75   03/21/22 86   02/02/22 61     BP Readings from Last 3 Encounters:   04/28/22 130/82   03/21/22 144/96   02/02/22 130/82       Physical Exam  Constitutional:       General: He is not in acute distress  Cardiovascular:      Rate and Rhythm: Normal rate and regular rhythm        Heart sounds: S1 normal and S2 normal  No murmur heard  Pulmonary:      Breath sounds: No wheezing or rhonchi  Musculoskeletal:      Right lower leg: No edema  Left lower leg: No edema  Neurological:      Mental Status: Mental status is at baseline  Psychiatric:         Mood and Affect: Mood normal          Pertinent Laboratory/Diagnostic Studies:    Laboratory studies reviewed personally by Katelyn Lawson MD    BMP:   Lab Results   Component Value Date    SODIUM 139 12/28/2016    K 4 3 12/28/2016     12/28/2016    CO2 30 12/28/2016    BUN 14 12/28/2016    CREATININE 1 12 12/28/2016    GLUC 86 12/28/2016    CALCIUM 9 1 12/28/2016     CBC:  Lab Results   Component Value Date    WBC 15 77 (H) 12/28/2016    RBC 4 95 12/28/2016    HGB 16 0 12/28/2016    HCT 46 9 12/28/2016    MCV 95 12/28/2016    MCH 32 3 12/28/2016    RDW 12 8 12/28/2016     12/28/2016     Coags:    Lipid Profile:   No results found for: CHOL  Lab Results   Component Value Date    HDL 47 12/28/2016     Lab Results   Component Value Date    LDLCALC 160 (H) 12/28/2016     Lab Results   Component Value Date    TRIG 138 12/28/2016      Lab Results   Component Value Date    [id number] 1 470 12/28/2016     Lab Results   Component Value Date    ALT 32 12/28/2016    AST 19 12/28/2016       Imaging Studies:     No results found  Rommel Grover MD, [facility]    Portions of the record may have been created with voice recognition software  Occasional wrong word or "sound a like" substitutions may have occurred due to the inherent limitations of voice recognition software  Read the chart carefully and recognize, using context, where substitutions have occurred

## 2022-04-28 NOTE — ASSESSMENT & PLAN NOTE
Chest pain felt to be noncardiac  Echo with no structural heart disease  Recent stress test showed no ischemic changes at 8 minutes with no clinical angina  Continue monitoring for worsening symptoms and risk factor modification

## 2022-04-28 NOTE — ASSESSMENT & PLAN NOTE
BP Readings from Last 3 Encounters:   04/28/22 130/82   03/21/22 144/96   02/02/22 130/82       Advised home blood pressure monitoring  Continue current medications  Lifestyle modification measures to help blood pressure control include:increased physical activity, low-salt diet rich in fruits and vegetables, avoidance of alcohol intake and maintaining healthy weight

## 2022-06-29 ENCOUNTER — OFFICE VISIT (OUTPATIENT)
Dept: PULMONOLOGY | Facility: CLINIC | Age: 57
End: 2022-06-29
Payer: COMMERCIAL

## 2022-06-29 VITALS
HEART RATE: 80 BPM | DIASTOLIC BLOOD PRESSURE: 80 MMHG | HEIGHT: 69 IN | OXYGEN SATURATION: 97 % | BODY MASS INDEX: 25.33 KG/M2 | SYSTOLIC BLOOD PRESSURE: 140 MMHG | RESPIRATION RATE: 16 BRPM | WEIGHT: 171 LBS | TEMPERATURE: 96.6 F

## 2022-06-29 DIAGNOSIS — J45.20 MILD INTERMITTENT ASTHMA WITHOUT COMPLICATION: ICD-10-CM

## 2022-06-29 DIAGNOSIS — R06.83 SNORING: Primary | ICD-10-CM

## 2022-06-29 DIAGNOSIS — U09.9 POST-COVID SYNDROME: ICD-10-CM

## 2022-06-29 DIAGNOSIS — R06.81 WITNESSED EPISODE OF APNEA: ICD-10-CM

## 2022-06-29 PROBLEM — J98.4 RESTRICTIVE LUNG DISEASE: Status: RESOLVED | Noted: 2022-03-21 | Resolved: 2022-06-29

## 2022-06-29 PROCEDURE — 99214 OFFICE O/P EST MOD 30 MIN: CPT | Performed by: INTERNAL MEDICINE

## 2022-06-29 RX ORDER — FLUTICASONE FUROATE AND VILANTEROL 200; 25 UG/1; UG/1
1 POWDER RESPIRATORY (INHALATION) DAILY
Qty: 180 BLISTER | Refills: 0 | Status: SHIPPED | OUTPATIENT
Start: 2022-06-29 | End: 2022-06-29

## 2022-06-29 RX ORDER — FLUTICASONE FUROATE AND VILANTEROL 200; 25 UG/1; UG/1
1 POWDER RESPIRATORY (INHALATION) DAILY
Qty: 180 BLISTER | Refills: 0 | Status: SHIPPED | OUTPATIENT
Start: 2022-06-29 | End: 2023-06-24

## 2022-06-29 NOTE — ASSESSMENT & PLAN NOTE
Outside of discrete exacerbations, his symptoms are well controlled  Will try to aim for a year of stable symptoms before deescalating to lower dose Breo  Continue current medications

## 2022-06-29 NOTE — PROGRESS NOTES
Pulmonary Follow Up Note   Lily Randolph 61 y o  male MRN: [id number]  6/29/2022    Assessment:    Snoring  At home sleep study ordered  Mild intermittent asthma  Outside of discrete exacerbations, his symptoms are well controlled  Will try to aim for a year of stable symptoms before deescalating to lower dose Breo  Continue current medications  Plan:    Diagnoses and all orders for this visit:    Snoring  -     Home Study; Future    Witnessed episode of apnea  -     Home Study; Future    Post-COVID syndrome  -     fluticasone-vilanterol (Breo Ellipta) 200-25 MCG/INH inhaler; Inhale 1 puff daily Rinse mouth after use  Mild intermittent asthma without complication      Return in about 6 months (around 12/29/2022)  History of Present Illness   HPI:  Lily Randolph is a 61 y o  male who presents for routine follow-up  He has had no acute issues since his last appointment  He has recently developed an upper respiratory tract infection which has required him to use his Xopenex once or twice per day, but his symptoms remain well controlled on this usual therapy  He notes that winter months and fall months tend to be the most difficult time for his seasonal allergies  Lucía Carballo brought up that his wife notes that she hears him snore heavily and witnesses having apneic episodes at home, for this reason he is requesting a sleep study  He reports he is chronically fatigued, but he does not need naps during the day and does not fall asleep easily or have difficulty maintaining wakefulness while doing activities like driving  Review of Systems   Respiratory: Negative for cough, shortness of breath and wheezing  All other systems reviewed and are negative      Historical Information   Past Medical History:   Diagnosis Date    Asthma     Depression     Hypertension     Pneumonia 03/2016     Past Surgical History:   Procedure Laterality Date    APPENDECTOMY      VASECTOMY      VasDeferens Family History   Problem Relation Age of Onset    Anxiety disorder Mother     Hypertension Father     Hyperlipidemia Father     Anxiety disorder Family     Heart disease Family     Stroke Family     No Known Problems Sister     No Known Problems Sister        Meds/Allergies     Current Outpatient Medications:     diphenhydrAMINE (BENADRYL) 25 mg tablet, Take by mouth  , Disp: , Rfl:     escitalopram (LEXAPRO) 5 mg tablet, TAKE 1 TABLET BY MOUTH  DAILY, Disp: 90 tablet, Rfl: 3    ezetimibe (ZETIA) 10 mg tablet, TAKE 1 TABLET BY MOUTH  DAILY, Disp: 90 tablet, Rfl: 3    fluticasone-vilanterol (Breo Ellipta) 200-25 MCG/INH inhaler, Inhale 1 puff daily Rinse mouth after use , Disp: 180 blister, Rfl: 0    levalbuterol (Xopenex HFA) 45 mcg/act inhaler, Inhale 1-2 puffs every 4 (four) hours as needed for wheezing, Disp: 45 g, Rfl: 3    multivitamin (THERAGRAN) TABS, Take 1 tablet by mouth daily, Disp: , Rfl:     Triamcinolone Acetonide (Nasacort Allergy) 55 MCG/ACT nasal spray, into each nostril, Disp: , Rfl:     triamterene-hydrochlorothiazide (MAXZIDE-25) 37 5-25 mg per tablet, TAKE 1 TABLET BY MOUTH  DAILY, Disp: 90 tablet, Rfl: 3  Allergies   Allergen Reactions    Budesonide-Formoterol Fumarate      Category: Adverse Reaction; [facility] - [id number]: Had a effluvium llike response to symbicort  He does do well with oral prednisone, fluticasone NS and short acting beta agonist (ventolin)    Rosuvastatin Myalgia    Seasonal Ic [Cholestatin]        Vitals: Blood pressure 140/80, pulse 80, temperature (!) 96 6 °F (35 9 °C), temperature source Tympanic, resp  rate 16, height 5' 9" (1 753 m), weight 77 6 kg (171 lb), SpO2 97 %  Body mass index is 25 25 kg/m²  Oxygen Therapy  SpO2: 97 %  Oxygen Therapy: None (Room air)    Physical Exam  Physical Exam  Vitals reviewed  Constitutional:       General: He is not in acute distress  Appearance: Normal appearance  He is well-developed   He is not ill-appearing  HENT:      Head: Normocephalic and atraumatic  Eyes:      General: No scleral icterus  Conjunctiva/sclera: Conjunctivae normal    Neck:      Thyroid: No thyromegaly  Vascular: No JVD  Cardiovascular:      Rate and Rhythm: Normal rate and regular rhythm  Heart sounds: Normal heart sounds  No murmur heard  No friction rub  No gallop  Pulmonary:      Effort: Pulmonary effort is normal  No respiratory distress  Breath sounds: Normal breath sounds  No wheezing or rales  Musculoskeletal:      Cervical back: Neck supple  Right lower leg: No edema  Left lower leg: No edema  Skin:     General: Skin is warm and dry  Findings: No rash  Neurological:      General: No focal deficit present  Mental Status: He is alert and oriented to person, place, and time  Mental status is at baseline  Psychiatric:         Mood and Affect: Mood normal          Behavior: Behavior normal      Labs: I have personally reviewed pertinent lab results  Lab Results   Component Value Date    WBC 15 77 (H) 12/28/2016    HGB 16 0 12/28/2016    HCT 46 9 12/28/2016    MCV 95 12/28/2016     12/28/2016     Lab Results   Component Value Date    CALCIUM 9 1 12/28/2016    K 4 3 12/28/2016    CO2 30 12/28/2016     12/28/2016    BUN 14 12/28/2016    CREATININE 1 12 12/28/2016     No results found for: IGE  Lab Results   Component Value Date    ALT 32 12/28/2016    AST 19 12/28/2016    ALKPHOS 99 12/28/2016     Imaging and other studies: I have personally reviewed pertinent films in PACS    EKG, Pathology, and Other Studies: I have personally reviewed pertinent reports  [facility]

## 2022-08-30 DIAGNOSIS — U09.9 POST-COVID SYNDROME: ICD-10-CM

## 2022-09-06 ENCOUNTER — TELEPHONE (OUTPATIENT)
Dept: SLEEP CENTER | Facility: HOSPITAL | Age: 57
End: 2022-09-06

## 2022-09-06 NOTE — TELEPHONE ENCOUNTER
----- Message from Giuliana Franklin DO sent at 9/4/2022 12:11 PM EDT -----  approved  ----- Message -----  From: Kimberly Parham  Sent: 1/2/8205   9:06 AM EDT  To: Sleep Medicine [facility] Provider    This home sleep study needs approval      If approved please sign and return to clerical pool  If denied please include reasons why  Also provide alternative testing if warranted  Please sign and return to clerical pool

## 2022-09-12 ENCOUNTER — PATIENT MESSAGE (OUTPATIENT)
Dept: FAMILY MEDICINE CLINIC | Facility: MEDICAL CENTER | Age: 57
End: 2022-09-12

## 2022-09-12 DIAGNOSIS — E78.00 PURE HYPERCHOLESTEROLEMIA: ICD-10-CM

## 2022-09-12 DIAGNOSIS — Z12.5 SCREENING FOR PROSTATE CANCER: ICD-10-CM

## 2022-09-12 DIAGNOSIS — I10 ESSENTIAL HYPERTENSION: Primary | ICD-10-CM

## 2022-09-12 DIAGNOSIS — Z13.29 THYROID DISORDER SCREENING: ICD-10-CM

## 2022-09-19 ENCOUNTER — APPOINTMENT (OUTPATIENT)
Dept: LAB | Facility: MEDICAL CENTER | Age: 57
End: 2022-09-19
Payer: COMMERCIAL

## 2022-09-19 DIAGNOSIS — Z12.5 SCREENING FOR PROSTATE CANCER: ICD-10-CM

## 2022-09-19 DIAGNOSIS — I10 ESSENTIAL HYPERTENSION: ICD-10-CM

## 2022-09-19 DIAGNOSIS — Z13.29 THYROID DISORDER SCREENING: ICD-10-CM

## 2022-09-19 LAB
ALBUMIN SERPL BCP-MCNC: 4.1 G/DL (ref 3.5–5)
ALP SERPL-CCNC: 78 U/L (ref 46–116)
ALT SERPL W P-5'-P-CCNC: 38 U/L (ref 12–78)
ANION GAP SERPL CALCULATED.3IONS-SCNC: 6 MMOL/L (ref 4–13)
AST SERPL W P-5'-P-CCNC: 21 U/L (ref 5–45)
BASOPHILS # BLD AUTO: 0.04 THOUSANDS/ΜL (ref 0–0.1)
BASOPHILS NFR BLD AUTO: 1 % (ref 0–1)
BILIRUB SERPL-MCNC: 1 MG/DL (ref 0.2–1)
BUN SERPL-MCNC: 14 MG/DL (ref 5–25)
CALCIUM SERPL-MCNC: 9.9 MG/DL (ref 8.3–10.1)
CHLORIDE SERPL-SCNC: 104 MMOL/L (ref 96–108)
CHOLEST SERPL-MCNC: 221 MG/DL
CO2 SERPL-SCNC: 30 MMOL/L (ref 21–32)
CREAT SERPL-MCNC: 1.14 MG/DL (ref 0.6–1.3)
EOSINOPHIL # BLD AUTO: 0.1 THOUSAND/ΜL (ref 0–0.61)
EOSINOPHIL NFR BLD AUTO: 1 % (ref 0–6)
ERYTHROCYTE [DISTWIDTH] IN BLOOD BY AUTOMATED COUNT: 13 % (ref 11.6–15.1)
GFR SERPL CREATININE-BSD FRML MDRD: 70 ML/MIN/1.73SQ M
GLUCOSE P FAST SERPL-MCNC: 95 MG/DL (ref 65–99)
HCT VFR BLD AUTO: 50.1 % (ref 36.5–49.3)
HDLC SERPL-MCNC: 51 MG/DL
HGB BLD-MCNC: 16.4 G/DL (ref 12–17)
IMM GRANULOCYTES # BLD AUTO: 0.02 THOUSAND/UL (ref 0–0.2)
IMM GRANULOCYTES NFR BLD AUTO: 0 % (ref 0–2)
LDLC SERPL CALC-MCNC: 137 MG/DL (ref 0–100)
LYMPHOCYTES # BLD AUTO: 1.32 THOUSANDS/ΜL (ref 0.6–4.47)
LYMPHOCYTES NFR BLD AUTO: 18 % (ref 14–44)
MCH RBC QN AUTO: 33.3 PG (ref 26.8–34.3)
MCHC RBC AUTO-ENTMCNC: 32.7 G/DL (ref 31.4–37.4)
MCV RBC AUTO: 102 FL (ref 82–98)
MONOCYTES # BLD AUTO: 0.69 THOUSAND/ΜL (ref 0.17–1.22)
MONOCYTES NFR BLD AUTO: 9 % (ref 4–12)
NEUTROPHILS # BLD AUTO: 5.29 THOUSANDS/ΜL (ref 1.85–7.62)
NEUTS SEG NFR BLD AUTO: 71 % (ref 43–75)
NONHDLC SERPL-MCNC: 170 MG/DL
NRBC BLD AUTO-RTO: 0 /100 WBCS
PLATELET # BLD AUTO: 198 THOUSANDS/UL (ref 149–390)
PMV BLD AUTO: 10.4 FL (ref 8.9–12.7)
POTASSIUM SERPL-SCNC: 3.6 MMOL/L (ref 3.5–5.3)
PROT SERPL-MCNC: 7.8 G/DL (ref 6.4–8.4)
PSA SERPL-MCNC: 0.7 NG/ML (ref 0–4)
RBC # BLD AUTO: 4.93 MILLION/UL (ref 3.88–5.62)
SODIUM SERPL-SCNC: 140 MMOL/L (ref 135–147)
TRIGL SERPL-MCNC: 167 MG/DL
TSH SERPL DL<=0.05 MIU/L-ACNC: 1.31 UIU/ML (ref 0.45–4.5)
WBC # BLD AUTO: 7.46 THOUSAND/UL (ref 4.31–10.16)

## 2022-09-19 PROCEDURE — 85025 COMPLETE CBC W/AUTO DIFF WBC: CPT

## 2022-09-19 PROCEDURE — G0103 PSA SCREENING: HCPCS

## 2022-09-19 PROCEDURE — 80053 COMPREHEN METABOLIC PANEL: CPT

## 2022-09-19 PROCEDURE — 84443 ASSAY THYROID STIM HORMONE: CPT

## 2022-09-19 PROCEDURE — 36415 COLL VENOUS BLD VENIPUNCTURE: CPT

## 2022-09-19 PROCEDURE — 80061 LIPID PANEL: CPT

## 2022-09-21 ENCOUNTER — OFFICE VISIT (OUTPATIENT)
Dept: FAMILY MEDICINE CLINIC | Facility: MEDICAL CENTER | Age: 57
End: 2022-09-21
Payer: COMMERCIAL

## 2022-09-21 VITALS
BODY MASS INDEX: 25.71 KG/M2 | DIASTOLIC BLOOD PRESSURE: 86 MMHG | HEART RATE: 70 BPM | SYSTOLIC BLOOD PRESSURE: 140 MMHG | OXYGEN SATURATION: 97 % | WEIGHT: 173.6 LBS | HEIGHT: 69 IN

## 2022-09-21 DIAGNOSIS — E78.00 PURE HYPERCHOLESTEROLEMIA: ICD-10-CM

## 2022-09-21 DIAGNOSIS — Z11.4 SCREENING FOR HIV (HUMAN IMMUNODEFICIENCY VIRUS): ICD-10-CM

## 2022-09-21 DIAGNOSIS — Z23 IMMUNIZATION DUE: ICD-10-CM

## 2022-09-21 DIAGNOSIS — J30.1 ALLERGIC RHINITIS DUE TO POLLEN, UNSPECIFIED SEASONALITY: ICD-10-CM

## 2022-09-21 DIAGNOSIS — Z00.00 PREVENTATIVE HEALTH CARE: Primary | ICD-10-CM

## 2022-09-21 DIAGNOSIS — Z11.59 NEED FOR HEPATITIS C SCREENING TEST: ICD-10-CM

## 2022-09-21 DIAGNOSIS — U09.9 POST-COVID SYNDROME: ICD-10-CM

## 2022-09-21 DIAGNOSIS — I10 ESSENTIAL HYPERTENSION: ICD-10-CM

## 2022-09-21 DIAGNOSIS — F41.1 GAD (GENERALIZED ANXIETY DISORDER): ICD-10-CM

## 2022-09-21 DIAGNOSIS — J45.20 MILD INTERMITTENT ASTHMA WITHOUT COMPLICATION: ICD-10-CM

## 2022-09-21 PROBLEM — R07.2 PRECORDIAL PAIN: Status: RESOLVED | Noted: 2022-01-18 | Resolved: 2022-09-21

## 2022-09-21 PROCEDURE — 3077F SYST BP >= 140 MM HG: CPT | Performed by: FAMILY MEDICINE

## 2022-09-21 PROCEDURE — 3079F DIAST BP 80-89 MM HG: CPT | Performed by: FAMILY MEDICINE

## 2022-09-21 PROCEDURE — 99213 OFFICE O/P EST LOW 20 MIN: CPT | Performed by: FAMILY MEDICINE

## 2022-09-21 PROCEDURE — 3725F SCREEN DEPRESSION PERFORMED: CPT | Performed by: FAMILY MEDICINE

## 2022-09-21 PROCEDURE — 99396 PREV VISIT EST AGE 40-64: CPT | Performed by: FAMILY MEDICINE

## 2022-09-21 NOTE — ASSESSMENT & PLAN NOTE
It is bad this time of year  Ragweed  He is using Nasacort spray and Benadryl at night  It gives him enough relief of his symptoms  Continue Nasacort and Benadryl

## 2022-09-21 NOTE — ASSESSMENT & PLAN NOTE
He has had two pain symptoms as part of long haul COVID  One was his exacerbation of asthma, the other is fatigue  Some days it is better than other days  Lately more is better than not  Continue live his life, just power through the fatigue  Eventually I think it will resolves

## 2022-09-21 NOTE — ASSESSMENT & PLAN NOTE
He is taking Dyazide  It is working well  His blood pressure today is 140/86  Most of the time it runs lower than that  Continue Dyazide, continue low-salt diet  Also exercise and modest weight loss

## 2022-09-21 NOTE — ASSESSMENT & PLAN NOTE
His last LDL was 137  That was last week  He is taking Zetia  He cannot tolerate statins  Continue Zetia, continue low-fat diet, continue exercising

## 2022-09-21 NOTE — ASSESSMENT & PLAN NOTE
The patient is using a Breo Ellipta and p r n  Xopenex  He is doing pretty well at this time, occasionally he needed the rescue  He was set back with post COVID and earlier this year he had a worse time with  Continue inhalers, continue follow-up with Pulmonary

## 2022-09-21 NOTE — PROGRESS NOTES
ADULT ANNUAL [address]  [facility]    NAME: Safia Mcintyre  AGE: 61 y o  SEX: male  : 1965     DATE: 2022     Assessment and Plan:     Problem List Items Addressed This Visit        Respiratory    Allergic rhinitis     It is bad this time of year  Ragweed  He is using Nasacort spray and Benadryl at night  It gives him enough relief of his symptoms  Continue Nasacort and Benadryl  Mild intermittent asthma     The patient is using a Breo Ellipta and p r n  Xopenex  He is doing pretty well at this time, occasionally he needed the rescue  He was set back with post COVID and earlier this year he had a worse time with  Continue inhalers, continue follow-up with Pulmonary  Cardiovascular and Mediastinum    Essential hypertension     He is taking Dyazide  It is working well  His blood pressure today is 140/86  Most of the time it runs lower than that  Continue Dyazide, continue low-salt diet  Also exercise and modest weight loss  Other    Pure hypercholesterolemia     His last LDL was 137  That was last week  He is taking Zetia  He cannot tolerate statins  Continue Zetia, continue low-fat diet, continue exercising  GABBY (generalized anxiety disorder)     He is doing well  He is taking Lexapro 5 milligrams  He wonders if he should go off it,    I told him that he should probably not before winter, he actually agrees with me  We can revisit this in the spring at that time         Post-COVID syndrome     He has had two pain symptoms as part of long haul COVID  One was his exacerbation of asthma, the other is fatigue  Some days it is better than other days  Lately more is better than not  Continue live his life, just power through the fatigue  Eventually I think it will resolves             Other Visit Diagnoses     Preventative health care    -  Primary    Need for hepatitis C screening test        Screening for HIV (human immunodeficiency virus)        Immunization due              Immunizations and preventive care screenings were discussed with patient today  Appropriate education was printed on patient's after visit summary  Discussed risks and benefits of prostate cancer screening  We discussed the controversial history of PSA screening for prostate cancer in the [place] as well as the risk of over detection and over treatment of prostate cancer by way of PSA screening  The patient understands that PSA blood testing is an imperfect way to screen for prostate cancer and that elevated PSA levels in the blood may also be caused by infection, inflammation, prostatic trauma or manipulation, urological procedures, or by benign prostatic enlargement  The role of the digital rectal examination in prostate cancer screening was also discussed and I discussed with him that there is large interobserver variability in the findings of digital rectal examination  Counseling:  Exercise: the importance of regular exercise/physical activity was discussed  Recommend exercise 3-5 times per week for at least 30 minutes  No follow-ups on file  Chief Complaint:     Chief Complaint   Patient presents with    Annual Exam      History of Present Illness:     Adult Annual Physical   Patient here for a comprehensive physical exam  The patient reports no problems  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  He is  and lives with his wife and his 60-year-old son  He has an older daughter and son as well  He is up-to-date with colorectal cancer screening and PSA  Diet and Physical Activity  Diet/Nutrition: well balanced diet  Exercise: walking        Depression Screening  PHQ-2/9 Depression Screening    Little interest or pleasure in doing things: 0 - not at all  Feeling down, depressed, or hopeless: 0 - not at all  PHQ-2 Score: 0  PHQ-2 Interpretation: Negative depression screen       General Health  Sleep: sleeps well  Hearing: normal - bilateral   Vision: no vision problems  Dental: regular dental visits   Health  Symptoms include: none     Review of Systems:     Review of Systems   Constitutional: Positive for fatigue (  Long haul COVID)  Negative for activity change and fever  HENT: Negative for congestion, ear discharge, ear pain, postnasal drip, rhinorrhea, sinus pain, sneezing and sore throat  Eyes: Negative for photophobia, pain, discharge and redness  Respiratory: Positive for wheezing (Response to rescue inhaler  It is occasional)  Negative for apnea, cough and shortness of breath  Cardiovascular: Negative for chest pain and palpitations  Gastrointestinal: Negative for abdominal pain, blood in stool, constipation, diarrhea, nausea and vomiting  Endocrine: Negative for polydipsia, polyphagia and polyuria  Genitourinary: Negative for decreased urine volume, difficulty urinating, dysuria, frequency, penile discharge, penile pain and urgency  Musculoskeletal: Negative for arthralgias, gait problem, joint swelling and neck pain  Skin: Negative for color change and rash  Neurological: Negative for dizziness, tremors, seizures, weakness and headaches  Psychiatric/Behavioral: Negative for agitation and sleep disturbance  The patient is not nervous/anxious         Past Medical History:     Past Medical History:   Diagnosis Date    Allergic     Asthma     Depression     Diverticulitis of colon     Hypertension     Pneumonia 03/2016    Precordial pain 1/18/2022      Past Surgical History:     Past Surgical History:   Procedure Laterality Date    APPENDECTOMY      VASECTOMY      VasDeferens      Family History:     Family History   Problem Relation Age of Onset    Anxiety disorder Mother     Hypertension Father     Hyperlipidemia Father     No Known Problems Sister     No Known Problems Sister     No Known Problems Son     No Known Problems Son     No Known Problems Daughter     Anxiety disorder Family     Heart disease Family     Stroke Family       Social History:     Social History     Socioeconomic History    Marital status: /Civil Union     Spouse name: None    Number of children: None    Years of education: None    Highest education level: None   Occupational History    None   Tobacco Use    Smoking status: Never Smoker    Smokeless tobacco: Never Used   Vaping Use    Vaping Use: Never used   Substance and Sexual Activity    Alcohol use: Yes     Alcohol/week: 7 0 standard drinks     Types: 7 Glasses of wine per week     Comment: one glass wine per day    Drug use: Never    Sexual activity: Yes     Partners: Female     Birth control/protection: Male Sterilization   Other Topics Concern    None   Social History Narrative    Caffeine Use     Social Determinants of Health     Financial Resource Strain: Not on file   Food Insecurity: Not on file   Transportation Needs: Not on file   Physical Activity: Not on file   Stress: Not on file   Social Connections: Not on file   Intimate Partner Violence: Not on file   Housing Stability: Not on file      Current Medications:     Current Outpatient Medications   Medication Sig Dispense Refill    Breo Ellipta 200-25 MCG/INH inhaler USE 1 INHALATION BY MOUTH  DAILY    RINSE MOUTH AFTER   each 3    diphenhydrAMINE (BENADRYL) 25 mg tablet Take by mouth        escitalopram (LEXAPRO) 5 mg tablet TAKE 1 TABLET BY MOUTH  DAILY 90 tablet 3    ezetimibe (ZETIA) 10 mg tablet TAKE 1 TABLET BY MOUTH  DAILY 90 tablet 3    levalbuterol (Xopenex HFA) 45 mcg/act inhaler Inhale 1-2 puffs every 4 (four) hours as needed for wheezing 45 g 3    multivitamin (THERAGRAN) TABS Take 1 tablet by mouth daily      Triamcinolone Acetonide (Nasacort Allergy) 55 MCG/ACT nasal spray into each nostril      triamterene-hydrochlorothiazide (MAXZIDE-25) 37 5-25 mg per tablet TAKE 1 TABLET BY MOUTH  DAILY 90 tablet 3     No current facility-administered medications for this visit  Allergies: Allergies   Allergen Reactions    Budesonide-Formoterol Fumarate      Category: Adverse Reaction; [facility] - [id number]: Had a effluvium llike response to symbicort  He does do well with oral prednisone, fluticasone NS and short acting beta agonist (ventolin)    Rosuvastatin Myalgia    Seasonal Ic [Cholestatin]       Physical Exam:     /86 (BP Location: Left arm, Patient Position: Sitting, Cuff Size: Adult)   Pulse 70   Ht 5' 9" (1 753 m)   Wt 78 7 kg (173 lb 9 6 oz)   SpO2 97%   BMI 25 64 kg/m²     Physical Exam  Vitals and nursing note reviewed  Constitutional:       General: He is not in acute distress  Appearance: Normal appearance  He is well-developed  He is not ill-appearing  HENT:      Head: Normocephalic  Right Ear: Tympanic membrane, ear canal and external ear normal       Left Ear: Tympanic membrane, ear canal and external ear normal       Nose: Nose normal  No rhinorrhea  Mouth/Throat:      Mouth: Mucous membranes are moist       Pharynx: Uvula midline  No oropharyngeal exudate  Tonsils: No tonsillar exudate  Eyes:      General: Lids are normal       Conjunctiva/sclera: Conjunctivae normal       Pupils: Pupils are equal, round, and reactive to light  Neck:      Thyroid: No thyromegaly  Vascular: No carotid bruit  Trachea: Trachea normal    Cardiovascular:      Rate and Rhythm: Normal rate and regular rhythm  Pulses: Normal pulses  Heart sounds: Normal heart sounds, S1 normal and S2 normal  No murmur heard  Pulmonary:      Effort: Pulmonary effort is normal  No respiratory distress  Breath sounds: Normal breath sounds  Abdominal:      General: Bowel sounds are normal       Palpations: Abdomen is soft  Tenderness: There is no abdominal tenderness  Hernia: No hernia is present     Musculoskeletal: General: Normal range of motion  Cervical back: Normal range of motion and neck supple  Lymphadenopathy:      Cervical: No cervical adenopathy  Skin:     General: Skin is warm and dry  Neurological:      General: No focal deficit present  Mental Status: He is alert and oriented to person, place, and time  Cranial Nerves: No cranial nerve deficit  Sensory: No sensory deficit  Gait: Gait normal       Deep Tendon Reflexes: Reflexes are normal and symmetric  Psychiatric:         Speech: Speech normal          Behavior: Behavior normal  Behavior is cooperative  Thought Content:  Thought content normal           Manuel Cabral MD  [id number] [facility]

## 2022-09-21 NOTE — ASSESSMENT & PLAN NOTE
He is doing well  He is taking Lexapro 5 milligrams  He wonders if he should go off it,    I told him that he should probably not before winter, he actually agrees with me    We can revisit this in the spring at that time

## 2022-10-12 DIAGNOSIS — E78.00 PURE HYPERCHOLESTEROLEMIA: ICD-10-CM

## 2022-10-12 DIAGNOSIS — I10 ESSENTIAL HYPERTENSION: ICD-10-CM

## 2022-10-13 RX ORDER — TRIAMTERENE AND HYDROCHLOROTHIAZIDE 37.5; 25 MG/1; MG/1
1 TABLET ORAL DAILY
Qty: 90 TABLET | Refills: 3 | Status: SHIPPED | OUTPATIENT
Start: 2022-10-13

## 2022-10-13 RX ORDER — EZETIMIBE 10 MG/1
TABLET ORAL
Qty: 90 TABLET | Refills: 3 | Status: SHIPPED | OUTPATIENT
Start: 2022-10-13

## 2023-01-14 DIAGNOSIS — F43.23 ADJUSTMENT REACTION WITH ANXIETY AND DEPRESSION: ICD-10-CM

## 2023-01-16 RX ORDER — ESCITALOPRAM OXALATE 5 MG/1
TABLET ORAL
Qty: 90 TABLET | Refills: 3 | Status: SHIPPED | OUTPATIENT
Start: 2023-01-16

## 2023-02-20 ENCOUNTER — TELEPHONE (OUTPATIENT)
Dept: FAMILY MEDICINE CLINIC | Facility: MEDICAL CENTER | Age: 58
End: 2023-02-20

## 2023-02-20 ENCOUNTER — OFFICE VISIT (OUTPATIENT)
Dept: FAMILY MEDICINE CLINIC | Facility: MEDICAL CENTER | Age: 58
End: 2023-02-20

## 2023-02-20 VITALS
OXYGEN SATURATION: 95 % | RESPIRATION RATE: 16 BRPM | TEMPERATURE: 101 F | DIASTOLIC BLOOD PRESSURE: 80 MMHG | HEART RATE: 96 BPM | SYSTOLIC BLOOD PRESSURE: 120 MMHG | WEIGHT: 181.8 LBS | BODY MASS INDEX: 26.93 KG/M2 | HEIGHT: 69 IN

## 2023-02-20 DIAGNOSIS — J20.8 ACUTE VIRAL BRONCHITIS: Primary | ICD-10-CM

## 2023-02-20 RX ORDER — BENZONATATE 200 MG/1
200 CAPSULE ORAL 3 TIMES DAILY PRN
Qty: 20 CAPSULE | Refills: 0 | Status: SHIPPED | OUTPATIENT
Start: 2023-02-20

## 2023-02-20 RX ORDER — GUAIFENESIN AND CODEINE PHOSPHATE 100; 10 MG/5ML; MG/5ML
5-10 SOLUTION ORAL 3 TIMES DAILY PRN
Qty: 120 ML | Refills: 0 | Status: SHIPPED | OUTPATIENT
Start: 2023-02-20

## 2023-02-20 NOTE — TELEPHONE ENCOUNTER
Pt concerned with some sx he's having  Last Wed 2/15 pt started feeling achy, felt hot and cold, but no temp, pt checked several times a day  Not really congested, but when taking a deep breath he gets pain, and will start to cough and will bring up some mucous  Pt concerned with the pain while taking a deep breath

## 2023-03-02 ENCOUNTER — PATIENT MESSAGE (OUTPATIENT)
Dept: PULMONOLOGY | Facility: CLINIC | Age: 58
End: 2023-03-02

## 2023-03-02 DIAGNOSIS — J45.20 MILD INTERMITTENT ASTHMA WITHOUT COMPLICATION: Primary | ICD-10-CM

## 2023-03-08 RX ORDER — PREDNISONE 20 MG/1
40 TABLET ORAL DAILY
Qty: 14 TABLET | Refills: 0 | Status: SHIPPED | OUTPATIENT
Start: 2023-03-08

## 2023-05-04 ENCOUNTER — OFFICE VISIT (OUTPATIENT)
Dept: CARDIOLOGY CLINIC | Facility: MEDICAL CENTER | Age: 58
End: 2023-05-04

## 2023-05-04 VITALS
WEIGHT: 183.2 LBS | SYSTOLIC BLOOD PRESSURE: 138 MMHG | OXYGEN SATURATION: 96 % | HEART RATE: 72 BPM | DIASTOLIC BLOOD PRESSURE: 72 MMHG | BODY MASS INDEX: 27.05 KG/M2

## 2023-05-04 DIAGNOSIS — E78.00 PURE HYPERCHOLESTEROLEMIA: ICD-10-CM

## 2023-05-04 DIAGNOSIS — R06.09 DYSPNEA ON EXERTION: ICD-10-CM

## 2023-05-04 DIAGNOSIS — I10 ESSENTIAL HYPERTENSION: Primary | ICD-10-CM

## 2023-05-04 DIAGNOSIS — U09.9 POST-COVID SYNDROME: ICD-10-CM

## 2023-05-04 NOTE — PROGRESS NOTES
[facility] RADHA Garland [address] [place] [id number]  Phone#  [phone]  Fax#  [phone]  [facility] Cardiology Office Follow-up Visit             NAME: Sherwin Sorenson  AGE: 61 y o  SEX: male   : 1965   MRN: [id number]    DATE: 2023  TIME: 8:28 AM    Cardiology Problem list:  Post-COVID syndrome-COVID positive :  Echo-EF 60, normal systolic and diastolic function  Valves normal   Bronchial asthma  Chest pain:  Probably noncardiac  ASCVD risk is 10%  Stress test 3/22- normal at 8 min  MVP by history  Palpitations    Assessment and plan:    Precordial pain  Chest pain previously  felt to be noncardiac  Echo with no structural heart disease  Recent stress test showed no ischemic changes at 8 minutes with no clinical angina  No further chest pain  Post Covid syndrome  Had URI in Feb   Reports cough and dyspnea with exertion since his cold in Feb   Report dark phlegm  Sees pulmonary on Tuesday      Palpitation  Previously he had reported tachycardia after use of bronchodilators  Most likely sinus tachycardia but symptoms now resolved  Defer further tests for now  If he has more frequent recurrence than a few times a month, we can obtain an event monitor        Essential hypertension  BP Readings from Last 3 Encounters:   23 138/72   23 120/80   22 140/86   Home blood pressure monitoring  Continue current medications  Lifestyle modification  Pure hypercholesterolemia  Lab Results   Component Value Date    LDLCALC 137 (H) 2022   On Zetia  LDL improved  Chief Complaint   Patient presents with    Follow-up     1 yr f/u; no cardiac related complaints; patient states that he has been dealing w/ SOB; doesn't attribute to any cardiac related illnesses        HPI:    Sherwin Sorenson is a 61y o -year-old male who presents to the cardiology clinic for follow up for the above-listed problems    Last visit was over a year ago  Previously was seen with chest pain and post-COVID syndrome  Echo and stress test were unremarkable  He does have dyslipidemia  Last LDL was 137  Previous LDL before that was 160  Lipids are managed by Dr Tera Ramirez  Previously recent worsening of dyspnea after his URI in Feb   Improved with prednisone  Still has cough with brown sputum, worse by the end of the day  He is on Zetia, reports statin intolerance  Gaining weight due to inactivity  Sees Dr Guadarrama Letters on Tuesday  No chest pain  Past history, family history, social history, current medications, vital signs, recent lab and imaging studies and  prior cardiology studies reviewed independently on this visit  Wt Readings from Last 3 Encounters:   05/04/23 83 1 kg (183 lb 3 2 oz)   02/20/23 82 5 kg (181 lb 12 8 oz)   09/21/22 78 7 kg (173 lb 9 6 oz)     Pulse Readings from Last 3 Encounters:   05/04/23 72   02/20/23 96   09/21/22 70     BP Readings from Last 3 Encounters:   05/04/23 138/72   02/20/23 120/80   09/21/22 140/86       Allergies   Allergen Reactions    Budesonide-Formoterol Fumarate      Category: Adverse Reaction; Annotation - [id number]: Had a effluvium llike response to symbicort  He does do well with oral prednisone, fluticasone NS and short acting beta agonist (ventolin)    Rosuvastatin Myalgia    Seasonal Ic [Cholestatin]        Current Outpatient Medications:     Breo Ellipta 200-25 MCG/INH inhaler, USE 1 INHALATION BY MOUTH  DAILY    RINSE MOUTH AFTER  USE, Disp: 180 each, Rfl: 3    diphenhydrAMINE (BENADRYL) 25 mg tablet, Take by mouth  , Disp: , Rfl:     escitalopram (LEXAPRO) 5 mg tablet, TAKE 1 TABLET BY MOUTH  DAILY, Disp: 90 tablet, Rfl: 3    ezetimibe (ZETIA) 10 mg tablet, Take 1 tablet (10 mg total) by mouth daily, Disp: 90 tablet, Rfl: 0    levalbuterol (Xopenex HFA) 45 mcg/act inhaler, Inhale 1-2 puffs every 4 (four) hours as needed for wheezing, Disp: 45 g, Rfl: 3    multivitamin (THERAGRAN) TABS, Take 1 tablet by mouth daily, Disp: , Rfl:     Triamcinolone Acetonide (Nasacort Allergy) 55 MCG/ACT nasal spray, into each nostril, Disp: , Rfl:     triamterene-hydrochlorothiazide (MAXZIDE-25) 37 5-25 mg per tablet, TAKE 1 TABLET BY MOUTH  DAILY, Disp: 90 tablet, Rfl: 3    Past Medical History:   Diagnosis Date    Allergic     Asthma     Depression     Diverticulitis of colon     Hypertension     Pneumonia 03/2016    Precordial pain 1/18/2022     Past Surgical History:   Procedure Laterality Date    APPENDECTOMY      VASECTOMY      VasDeferens     Family History   Problem Relation Age of Onset    Anxiety disorder Mother     Hypertension Father     Hyperlipidemia Father     No Known Problems Sister     No Known Problems Sister     No Known Problems Son     No Known Problems Son     No Known Problems Daughter     Anxiety disorder Family     Heart disease Family     Stroke Family      Social History   reports that he has never smoked  He has never used smokeless tobacco  He reports current alcohol use of about 7 0 standard drinks per week  He reports that he does not use drugs  Review of Systems   Constitutional: Negative for fever  Respiratory: Positive for cough and shortness of breath  Negative for chest tightness and wheezing  Cardiovascular: Negative for chest pain, palpitations and leg swelling  Endocrine: Negative  Musculoskeletal: Negative  Skin: Negative for rash  Allergic/Immunologic: Negative  Neurological: Negative for syncope  Hematological: Does not bruise/bleed easily  Psychiatric/Behavioral: Negative  Objective:     Vitals:    05/04/23 0818   BP: 138/72   Pulse: 72   SpO2: 96%     Physical Exam  Vitals reviewed  Constitutional:       General: He is not in acute distress  HENT:      Head: Normocephalic  Cardiovascular:      Rate and Rhythm: Normal rate and regular rhythm        Heart sounds: S1 normal and S2 normal  No murmur "heard  Pulmonary:      Breath sounds: No wheezing or rhonchi  Musculoskeletal:      Right lower leg: No edema  Left lower leg: No edema  Skin:     General: Skin is warm  Neurological:      Mental Status: He is alert  Mental status is at baseline  Psychiatric:         Mood and Affect: Mood normal          Pertinent Laboratory/Diagnostic Studies:    Laboratory studies reviewed personally by Jose Prado MD    BMP:   Lab Results   Component Value Date    SODIUM 140 09/19/2022    K 3 6 09/19/2022     09/19/2022    CO2 30 09/19/2022    BUN 14 09/19/2022    CREATININE 1 14 09/19/2022    GLUC 86 12/28/2016    CALCIUM 9 9 09/19/2022     CBC:  Lab Results   Component Value Date    WBC 7 46 09/19/2022    RBC 4 93 09/19/2022    HGB 16 4 09/19/2022    HCT 50 1 (H) 09/19/2022     (H) 09/19/2022    MCH 33 3 09/19/2022    RDW 13 0 09/19/2022     09/19/2022     Coags:    Lipid Profile:   No results found for: CHOL  Lab Results   Component Value Date    HDL 51 09/19/2022     Lab Results   Component Value Date    LDLCALC 137 (H) 09/19/2022     Lab Results   Component Value Date    TRIG 167 (H) 09/19/2022      Other labs:  Lab Results   Component Value Date    EHZ8UUZQTEMW 1 310 09/19/2022     Lab Results   Component Value Date    ALT 38 09/19/2022    AST 21 09/19/2022           Imaging Studies:     Pertinent imaging studies and cardiac studies were independently reviewed on this visit and findings summarized  Visit diagnoses  1  Essential hypertension        2  Pure hypercholesterolemia        3  Post-COVID syndrome        4  Dyspnea on exertion            Floyd Collins MD, [facility]    Portions of the record may have been created with voice recognition software  Occasional wrong word or \"sound alike\" substitutions may have occurred due to the inherent limitations of voice recognition software  Read the chart carefully and recognize, using context, where substitutions have occurred   Please reach out " to me directly for any clarifications

## 2023-05-04 NOTE — PATIENT INSTRUCTIONS
Continue current cardiac medications  Follow up Dr Katelyn Rincon as planned  Keep follow-up as planned

## 2023-05-09 ENCOUNTER — OFFICE VISIT (OUTPATIENT)
Dept: PULMONOLOGY | Facility: CLINIC | Age: 58
End: 2023-05-09

## 2023-05-09 ENCOUNTER — APPOINTMENT (OUTPATIENT)
Dept: LAB | Facility: MEDICAL CENTER | Age: 58
End: 2023-05-09

## 2023-05-09 ENCOUNTER — APPOINTMENT (OUTPATIENT)
Dept: RADIOLOGY | Facility: MEDICAL CENTER | Age: 58
End: 2023-05-09

## 2023-05-09 VITALS
SYSTOLIC BLOOD PRESSURE: 142 MMHG | BODY MASS INDEX: 27.11 KG/M2 | TEMPERATURE: 97 F | WEIGHT: 183 LBS | HEIGHT: 69 IN | HEART RATE: 69 BPM | DIASTOLIC BLOOD PRESSURE: 90 MMHG | RESPIRATION RATE: 16 BRPM | OXYGEN SATURATION: 97 %

## 2023-05-09 DIAGNOSIS — R06.83 SNORING: ICD-10-CM

## 2023-05-09 DIAGNOSIS — B44.81 ALLERGIC BRONCHOPULMONARY ASPERGILLOSIS (HCC): ICD-10-CM

## 2023-05-09 DIAGNOSIS — J45.40 MODERATE PERSISTENT ASTHMA WITHOUT COMPLICATION: ICD-10-CM

## 2023-05-09 DIAGNOSIS — J45.20 MILD INTERMITTENT ASTHMA WITHOUT COMPLICATION: ICD-10-CM

## 2023-05-09 DIAGNOSIS — J45.20 MILD INTERMITTENT ASTHMA WITHOUT COMPLICATION: Primary | ICD-10-CM

## 2023-05-09 RX ORDER — PREDNISONE 20 MG/1
40 TABLET ORAL DAILY
Qty: 14 TABLET | Refills: 0 | Status: SHIPPED | OUTPATIENT
Start: 2023-05-09 | End: 2023-05-15

## 2023-05-09 NOTE — ASSESSMENT & PLAN NOTE
Yanira Pal has unfortunately had an exacerbation from which he has not recovered to his baseline  His symptoms may also be consistent with an alternative diagnosis like allergic bronchopulmonary aspergillosis      · Trial of prednisone again, 40 mg x 7 days, escalate to 60 mg dropping by 10 mg every 3 days if insufficient to control symptoms  · Collect IgE level and Aspergillus specific IgE level  · Collect basic chest x-ray  · Collect sputum culture

## 2023-05-09 NOTE — PROGRESS NOTES
Pulmonary Follow Up Note   Donna Love 61 y o  male MRN: [id number]  5/9/2023    Assessment:    Moderate persistent asthma without complication  Sara Duffy has unfortunately had an exacerbation from which he has not recovered to his baseline  His symptoms may also be consistent with an alternative diagnosis like allergic bronchopulmonary aspergillosis  Trial of prednisone again, 40 mg x 7 days, escalate to 60 mg dropping by 10 mg every 3 days if insufficient to control symptoms  Collect IgE level and Aspergillus specific IgE level  Collect basic chest x-ray  Collect sputum culture    Plan:    Diagnoses and all orders for this visit:    Mild intermittent asthma without complication  -     Aspergillus fumagatus IgE; Future  -     IgE; Future  -     XR chest pa & lateral; Future  -     predniSONE 20 mg tablet; Take 2 tablets (40 mg total) by mouth daily    Allergic bronchopulmonary aspergillosis (HCC)  -     XR chest pa & lateral; Future  -     Sputum culture and Gram stain; Future    Snoring  -     Home Study; Future    Moderate persistent asthma without complication    Follow-up in 2 to 3 months  History of Present Illness   HPI:  Donna Love is a 61 y o  male who presents for a routine appointment, but with some acute complaints  He reports that in February he got a cold, which was complicated by significant difficulty with his breathing  He got in touch with me at that time, we placed him on steroids, 40 mg of prednisone for a week  He felt like this knocked his symptoms down by about 50%, but never completely took them away  He has still had a lot of trouble breathing, difficulty exercising, and some difficulty doing simple things like talking  He feels like after the prednisone things have gotten no better or potentially slightly worse  He notes feeling wiped out after climbing a flight of stairs    He is coughing with mucus production, worst in the morning with a dark brown coloration to it, turning green the rest of the day  He is using his rescue inhaler at least daily, sometimes multiple times daily, but is still getting benefit from it  He also had a constant low-grade fever for a period of time, which has not been present today  He reports no new home-based or environmental exposures  There has not been any mold discovered in the home  Review of Systems   Constitutional: Positive for fatigue and fever  Respiratory: Positive for cough and shortness of breath  All other systems reviewed and are negative  Historical Information   Past Medical History:   Diagnosis Date   • Allergic    • Asthma    • Depression    • Diverticulitis of colon    • Hypertension    • Pneumonia 03/2016   • Precordial pain 1/18/2022     Past Surgical History:   Procedure Laterality Date   • APPENDECTOMY     • VASECTOMY      VasDeferens     Family History   Problem Relation Age of Onset   • Anxiety disorder Mother    • Hypertension Father    • Hyperlipidemia Father    • No Known Problems Sister    • No Known Problems Sister    • No Known Problems Son    • No Known Problems Son    • No Known Problems Daughter    • Anxiety disorder Family    • Heart disease Family    • Stroke Family        Meds/Allergies     Current Outpatient Medications:   •  Breo Ellipta 200-25 MCG/INH inhaler, USE 1 INHALATION BY MOUTH  DAILY    RINSE MOUTH AFTER  USE, Disp: 180 each, Rfl: 3  •  diphenhydrAMINE (BENADRYL) 25 mg tablet, Take by mouth  , Disp: , Rfl:   •  escitalopram (LEXAPRO) 5 mg tablet, TAKE 1 TABLET BY MOUTH  DAILY, Disp: 90 tablet, Rfl: 3  •  ezetimibe (ZETIA) 10 mg tablet, Take 1 tablet (10 mg total) by mouth daily, Disp: 90 tablet, Rfl: 0  •  levalbuterol (Xopenex HFA) 45 mcg/act inhaler, Inhale 1-2 puffs every 4 (four) hours as needed for wheezing, Disp: 45 g, Rfl: 3  •  multivitamin (THERAGRAN) TABS, Take 1 tablet by mouth daily, Disp: , Rfl:   •  predniSONE 20 mg tablet, Take 2 tablets (40 mg total) by mouth daily, "Disp: 14 tablet, Rfl: 0  •  Triamcinolone Acetonide (Nasacort Allergy) 55 MCG/ACT nasal spray, into each nostril, Disp: , Rfl:   •  triamterene-hydrochlorothiazide (MAXZIDE-25) 37 5-25 mg per tablet, TAKE 1 TABLET BY MOUTH  DAILY, Disp: 90 tablet, Rfl: 3  Allergies   Allergen Reactions   • Budesonide-Formoterol Fumarate      Category: Adverse Reaction; Annotation - [id number]: Had a effluvium llike response to symbicort  He does do well with oral prednisone, fluticasone NS and short acting beta agonist (ventolin)   • Rosuvastatin Myalgia   • Seasonal Ic [Cholestatin]        Vitals: Blood pressure 142/90, pulse 69, temperature (!) 97 °F (36 1 °C), temperature source Tympanic, resp  rate 16, height 5' 9\" (1 753 m), weight 83 kg (183 lb), SpO2 97 %  Body mass index is 27 02 kg/m²  Oxygen Therapy  SpO2: 97 %  Oxygen Therapy: None (Room air)    Physical Exam  Physical Exam  Vitals reviewed  Constitutional:       General: He is not in acute distress  Appearance: Normal appearance  He is well-developed  He is not ill-appearing  HENT:      Head: Normocephalic and atraumatic  Eyes:      General: No scleral icterus  Conjunctiva/sclera: Conjunctivae normal    Neck:      Thyroid: No thyromegaly  Vascular: No JVD  Cardiovascular:      Rate and Rhythm: Normal rate and regular rhythm  Heart sounds: Normal heart sounds  No murmur heard  No friction rub  No gallop  Pulmonary:      Effort: Pulmonary effort is normal  No respiratory distress  Breath sounds: Normal breath sounds  No wheezing or rales  Musculoskeletal:      Cervical back: Neck supple  Right lower leg: No edema  Left lower leg: No edema  Skin:     General: Skin is warm and dry  Findings: No rash  Neurological:      General: No focal deficit present  Mental Status: He is alert and oriented to person, place, and time  Mental status is at baseline     Psychiatric:         Mood and Affect: Mood normal          " Behavior: Behavior normal          Labs: I have personally reviewed pertinent lab results  Lab Results   Component Value Date    WBC 7 46 09/19/2022    HGB 16 4 09/19/2022    HCT 50 1 (H) 09/19/2022     (H) 09/19/2022     09/19/2022     Lab Results   Component Value Date    CALCIUM 9 9 09/19/2022    K 3 6 09/19/2022    CO2 30 09/19/2022     09/19/2022    BUN 14 09/19/2022    CREATININE 1 14 09/19/2022     No results found for: IGE  Lab Results   Component Value Date    ALT 38 09/19/2022    AST 21 09/19/2022    ALKPHOS 78 09/19/2022       Imaging and other studies: I have personally reviewed relevant films in PACS  EKG, Pathology, and Other Studies: I have personally reviewed relevant reports in [facility]

## 2023-05-11 ENCOUNTER — APPOINTMENT (OUTPATIENT)
Dept: LAB | Facility: MEDICAL CENTER | Age: 58
End: 2023-05-11

## 2023-05-11 DIAGNOSIS — B44.81 ALLERGIC BRONCHOPULMONARY ASPERGILLOSIS (HCC): ICD-10-CM

## 2023-05-11 LAB
A FUMIGATUS IGE QN: <0.1 KUA/I
TOTAL IGE SMQN RAST: 38.3 KU/L (ref 0–113)

## 2023-05-12 ENCOUNTER — PATIENT MESSAGE (OUTPATIENT)
Dept: PULMONOLOGY | Facility: CLINIC | Age: 58
End: 2023-05-12

## 2023-05-12 DIAGNOSIS — J45.40 MODERATE PERSISTENT ASTHMA WITHOUT COMPLICATION: Primary | ICD-10-CM

## 2023-05-13 LAB
BACTERIA SPT RESP CULT: NORMAL
GRAM STN SPEC: NORMAL

## 2023-05-15 DIAGNOSIS — E78.00 PURE HYPERCHOLESTEROLEMIA: ICD-10-CM

## 2023-05-15 RX ORDER — PREDNISONE 20 MG/1
TABLET ORAL
Qty: 21 TABLET | Refills: 0 | Status: SHIPPED | OUTPATIENT
Start: 2023-05-15 | End: 2023-05-27

## 2023-05-15 RX ORDER — EZETIMIBE 10 MG/1
10 TABLET ORAL DAILY
Qty: 90 TABLET | Refills: 1 | Status: SHIPPED | OUTPATIENT
Start: 2023-05-15

## 2023-06-01 ENCOUNTER — TELEPHONE (OUTPATIENT)
Dept: SLEEP CENTER | Facility: CLINIC | Age: 58
End: 2023-06-01

## 2023-06-01 NOTE — TELEPHONE ENCOUNTER
----- Message from Alexx Pendleton MD sent at 5/31/2023  4:39 PM EDT -----  Approved   ----- Message -----  From: Gertrudis Tran  Sent: 5/31/2023   8:38 AM EDT  To: Sleep Medicine [facility] Provider    This Home sleep study needs approval      If approved please sign and return to clerical pool  If denied please include reasons why  Also provide alternative testing if warranted  Please sign and return to clerical pool

## 2023-08-02 ENCOUNTER — HOSPITAL ENCOUNTER (OUTPATIENT)
Facility: CLINIC | Age: 58
Discharge: HOME | End: 2023-08-02
Payer: COMMERCIAL

## 2023-08-02 DIAGNOSIS — R06.83 SNORING: ICD-10-CM

## 2023-08-02 PROCEDURE — G0399 HOME SLEEP TEST/TYPE 3 PORTA: HCPCS

## 2023-08-10 PROCEDURE — 95806 SLEEP STUDY UNATT&RESP EFFT: CPT | Performed by: INTERNAL MEDICINE

## 2023-08-10 NOTE — PROGRESS NOTES
Home Sleep Study Documentation    HOME STUDY DEVICE: Noxturnal yes                                           Antonia G3 no      Pre-Sleep Home Study:    Set-up and instructions performed by: -    Technician performed demonstration for Patient: yes    Return demonstration performed by Patient: yes    Written instructions provided to Patient: yes    Patient signed consent form: yes        Post-Sleep Home Study:    Additional comments by Patient: None    Home Sleep Study Failed:no:    Failure reason: N/A    Reported or Detected: N/A    Scored by: Andres Reeves

## 2023-08-11 DIAGNOSIS — G47.33 OSA (OBSTRUCTIVE SLEEP APNEA): Primary | ICD-10-CM

## 2023-08-14 ENCOUNTER — TELEPHONE (OUTPATIENT)
Dept: SLEEP CENTER | Facility: CLINIC | Age: 58
End: 2023-08-14

## 2023-08-14 NOTE — TELEPHONE ENCOUNTER
Patient of Dr. Jose Ovalle in the [facility]) pulmonary office. Sleep study resulted and shows moderate ALEX, worse in the supine position. APAP ordered. Message with results sent to patient by Dr. Jose Ovalle on 8/11/23. Left message for patient to call office to review sleep study results if he prefers or is unable to view TextMaster message.

## 2023-08-18 ENCOUNTER — DOCUMENTATION (OUTPATIENT)
Dept: PULMONOLOGY | Facility: CLINIC | Age: 58
End: 2023-08-18

## 2023-09-11 ENCOUNTER — OFFICE VISIT (OUTPATIENT)
Dept: PULMONOLOGY | Facility: CLINIC | Age: 58
End: 2023-09-11
Payer: COMMERCIAL

## 2023-09-11 ENCOUNTER — TELEPHONE (OUTPATIENT)
Dept: FAMILY MEDICINE CLINIC | Facility: MEDICAL CENTER | Age: 58
End: 2023-09-11

## 2023-09-11 VITALS
BODY MASS INDEX: 26.66 KG/M2 | TEMPERATURE: 97.9 F | OXYGEN SATURATION: 99 % | DIASTOLIC BLOOD PRESSURE: 90 MMHG | HEIGHT: 69 IN | HEART RATE: 65 BPM | SYSTOLIC BLOOD PRESSURE: 138 MMHG | WEIGHT: 180 LBS

## 2023-09-11 DIAGNOSIS — J45.40 MODERATE PERSISTENT ASTHMA WITHOUT COMPLICATION: ICD-10-CM

## 2023-09-11 DIAGNOSIS — Z13.29 THYROID DISORDER SCREENING: ICD-10-CM

## 2023-09-11 DIAGNOSIS — I10 ESSENTIAL HYPERTENSION: ICD-10-CM

## 2023-09-11 DIAGNOSIS — E78.00 PURE HYPERCHOLESTEROLEMIA: Primary | ICD-10-CM

## 2023-09-11 DIAGNOSIS — G47.33 OSA (OBSTRUCTIVE SLEEP APNEA): Primary | ICD-10-CM

## 2023-09-11 DIAGNOSIS — Z12.5 SCREENING FOR PROSTATE CANCER: ICD-10-CM

## 2023-09-11 PROCEDURE — 99214 OFFICE O/P EST MOD 30 MIN: CPT | Performed by: INTERNAL MEDICINE

## 2023-09-11 NOTE — PROGRESS NOTES
Pulmonary Follow Up Note   Erendira Moreno 61 y.o. male MRN: [id number]  9/11/2023    Assessment:    ALEX (obstructive sleep apnea)  After discussion of risks and benefits, Sheron Meeks is open to trying CPAP. An order for auto-therapy at 4-15cm H2O is already available and will be processed. He would be a reasonable candidate for the Inspire device if he cannot tolerate CPAP. We will make this referral if needed. Moderate persistent asthma without complication  Sheron Meeks continues to have relatively minor flareups on the order of once a month or so characterized by fatigue primarily, as well as more severe exacerbations requiring prolonged steroid courses. We will first try escalation to triple therapy with Trelegy. Sample provided at 200mcg dose. If this is sufficient, will continue that. If patient continues to have periodic exacerbations requiring systemic steroids, he would seem to be a candidate for injection-based therapy like Dupixent or Tezspire. Plan:    Diagnoses and all orders for this visit:    ALEX (obstructive sleep apnea)   - Auto-CPAP 4-15 cm H2O   - Refer to mask fit clinic if any issues    Moderate persistent asthma without complication   - Trial Trelegy 200mcg   - If more effective, will send script. If no improvement, return to Trelegy   - Consider injectable medications if continues to have exacerbations    Return in about 3 months (around 12/11/2023). History of Present Illness   HPI:  Erendira Moreno is a 61 y.o. male who presents for routine follow up. No new concerns today. He continues to have flareups of his asthma requiring prednisone at least once or twice a year, including recently where he needed close to 3 weeks of prednisone. He has recovered from that but he still has about 1-2 days per month where he feels primarily fatigue which he attributes to his asthma. He states he feels like he doesn't have the endurance he expects to have.   He is interested in escalating therapy to try to achieve better asthma control. We discussed injectable therapy but he also has room to escalate inhaler based treatments. Regarding CPAP, he notes he is still snoring heavily. He is open to trying PAP therapy. Review of Systems   Constitutional: Positive for fatigue. Respiratory: Positive for shortness of breath. All other systems reviewed and are negative. Historical Information   Past Medical History:   Diagnosis Date   • Allergic    • Asthma    • Depression    • Diverticulitis of colon    • Hypertension    • Pneumonia 03/2016   • Precordial pain 1/18/2022     Past Surgical History:   Procedure Laterality Date   • APPENDECTOMY     • VASECTOMY      VasDeferens     Family History   Problem Relation Age of Onset   • Anxiety disorder Mother    • Hypertension Father    • Hyperlipidemia Father    • No Known Problems Sister    • No Known Problems Sister    • No Known Problems Son    • No Known Problems Son    • No Known Problems Daughter    • Anxiety disorder Family    • Heart disease Family    • Stroke Family        Meds/Allergies     Current Outpatient Medications:   •  Breo Ellipta 200-25 MCG/INH inhaler, USE 1 INHALATION BY MOUTH  DAILY .  RINSE MOUTH AFTER  USE, Disp: 180 each, Rfl: 3  •  diphenhydrAMINE (BENADRYL) 25 mg tablet, Take by mouth  , Disp: , Rfl:   •  escitalopram (LEXAPRO) 5 mg tablet, TAKE 1 TABLET BY MOUTH  DAILY, Disp: 90 tablet, Rfl: 3  •  ezetimibe (ZETIA) 10 mg tablet, Take 1 tablet (10 mg total) by mouth daily, Disp: 90 tablet, Rfl: 1  •  levalbuterol (Xopenex HFA) 45 mcg/act inhaler, Inhale 1-2 puffs every 4 (four) hours as needed for wheezing, Disp: 45 g, Rfl: 3  •  multivitamin (THERAGRAN) TABS, Take 1 tablet by mouth daily, Disp: , Rfl:   •  Triamcinolone Acetonide (Nasacort Allergy) 55 MCG/ACT nasal spray, into each nostril, Disp: , Rfl:   •  triamterene-hydrochlorothiazide (MAXZIDE-25) 37.5-25 mg per tablet, TAKE 1 TABLET BY MOUTH  DAILY, Disp: 90 tablet, Rfl: 3  Allergies   Allergen Reactions   • Budesonide-Formoterol Fumarate      Category: Adverse Reaction; Annotation - [id number]: Had a effluvium llike response to symbicort. He does do well with oral prednisone, fluticasone NS and short acting beta agonist (ventolin)   • Rosuvastatin Myalgia   • Seasonal Ic [Cholestatin]        Vitals: Blood pressure 138/90, pulse 65, temperature 97.9 °F (36.6 °C), height 5' 9" (1.753 m), weight 81.6 kg (180 lb), SpO2 99 %. Body mass index is 26.58 kg/m². Oxygen Therapy  SpO2: 99 %  Oxygen Therapy: None (Room air)    Physical Exam  Physical Exam  Vitals reviewed. Constitutional:       General: He is not in acute distress. Appearance: Normal appearance. He is well-developed. He is not ill-appearing. HENT:      Head: Normocephalic and atraumatic. Eyes:      General: No scleral icterus. Conjunctiva/sclera: Conjunctivae normal.   Neck:      Thyroid: No thyromegaly. Vascular: No JVD. Cardiovascular:      Rate and Rhythm: Normal rate and regular rhythm. Heart sounds: Normal heart sounds. No murmur heard. No friction rub. No gallop. Pulmonary:      Effort: Pulmonary effort is normal. No respiratory distress. Breath sounds: Normal breath sounds. No wheezing or rales. Musculoskeletal:      Cervical back: Neck supple. Right lower leg: No edema. Left lower leg: No edema. Skin:     General: Skin is warm and dry. Findings: No rash. Neurological:      General: No focal deficit present. Mental Status: He is alert and oriented to person, place, and time. Mental status is at baseline. Psychiatric:         Mood and Affect: Mood normal.         Behavior: Behavior normal.     Labs: I have personally reviewed pertinent lab results.   Lab Results   Component Value Date    WBC 7.46 09/19/2022    HGB 16.4 09/19/2022    HCT 50.1 (H) 09/19/2022     (H) 09/19/2022     09/19/2022     Lab Results   Component Value Date    CALCIUM 9.9 09/19/2022    K 3.6 09/19/2022    CO2 30 09/19/2022     09/19/2022    BUN 14 09/19/2022    CREATININE 1.14 09/19/2022     Lab Results   Component Value Date    IGE 38.3 05/09/2023     Lab Results   Component Value Date    ALT 38 09/19/2022    AST 21 09/19/2022    ALKPHOS 78 09/19/2022       Imaging and other studies: I have personally reviewed relevant films in PACS. EKG, Pathology, and Other Studies: I have personally reviewed relevant reports in Epic. Alessandra Montana M.D.   [facility]

## 2023-09-11 NOTE — ASSESSMENT & PLAN NOTE
Suad Brink continues to have relatively minor flareups on the order of once a month or so characterized by fatigue primarily, as well as more severe exacerbations requiring prolonged steroid courses. We will first try escalation to triple therapy with Trelegy. Sample provided at 200mcg dose. If this is sufficient, will continue that. If patient continues to have periodic exacerbations requiring systemic steroids, he would seem to be a candidate for injection-based therapy like Dupixent or Tezspire.

## 2023-09-11 NOTE — ASSESSMENT & PLAN NOTE
After discussion of risks and benefits, Shantal Meza is open to trying CPAP. An order for auto-therapy at 4-15cm H2O is already available and will be processed. He would be a reasonable candidate for the Inspire device if he cannot tolerate CPAP. We will make this referral if needed.

## 2023-09-11 NOTE — TELEPHONE ENCOUNTER
----- Message from Jose Manuel Albert MD sent at 9/11/2023  2:06 PM EDT -----  Regarding: FW: Needed bloodwork for physical   Contact: [phone]  Would you please order the standard blood work for a man over 48.  ----- Message -----  From: Hyland Bence, MA  Sent: 9/11/2023   1:38 PM EDT  To: Jose Manuel Albert MD  Subject: FW: Needed bloodwork for physical                      ----- Message -----  From: Dez Kevin "Hira Kirkland"  Sent: 9/11/2023   1:36 PM EDT  To:  [facility] Clinical  Subject: Needed bloodwork for physical                    I meant to ask about the blood work

## 2023-09-13 LAB

## 2023-09-16 DIAGNOSIS — U09.9 POST-COVID SYNDROME: ICD-10-CM

## 2023-09-16 DIAGNOSIS — I10 ESSENTIAL HYPERTENSION: ICD-10-CM

## 2023-09-18 ENCOUNTER — APPOINTMENT (OUTPATIENT)
Dept: LAB | Facility: MEDICAL CENTER | Age: 58
End: 2023-09-18
Payer: COMMERCIAL

## 2023-09-18 DIAGNOSIS — E78.00 PURE HYPERCHOLESTEROLEMIA: ICD-10-CM

## 2023-09-18 DIAGNOSIS — Z13.29 THYROID DISORDER SCREENING: ICD-10-CM

## 2023-09-18 DIAGNOSIS — Z12.5 SCREENING FOR PROSTATE CANCER: ICD-10-CM

## 2023-09-18 DIAGNOSIS — I10 ESSENTIAL HYPERTENSION: ICD-10-CM

## 2023-09-18 LAB
ALBUMIN SERPL BCP-MCNC: 4.3 G/DL (ref 3.5–5)
ALP SERPL-CCNC: 59 U/L (ref 34–104)
ALT SERPL W P-5'-P-CCNC: 24 U/L (ref 7–52)
ANION GAP SERPL CALCULATED.3IONS-SCNC: 5 MMOL/L
AST SERPL W P-5'-P-CCNC: 22 U/L (ref 13–39)
BASOPHILS # BLD AUTO: 0.05 THOUSANDS/ÂΜL (ref 0–0.1)
BASOPHILS NFR BLD AUTO: 1 % (ref 0–1)
BILIRUB SERPL-MCNC: 0.81 MG/DL (ref 0.2–1)
BUN SERPL-MCNC: 14 MG/DL (ref 5–25)
CALCIUM SERPL-MCNC: 9.3 MG/DL (ref 8.4–10.2)
CHLORIDE SERPL-SCNC: 101 MMOL/L (ref 96–108)
CHOLEST SERPL-MCNC: 245 MG/DL
CO2 SERPL-SCNC: 33 MMOL/L (ref 21–32)
CREAT SERPL-MCNC: 1 MG/DL (ref 0.6–1.3)
DME PARACHUTE DELIVERY DATE REQUESTED: NORMAL
DME PARACHUTE ITEM DESCRIPTION: NORMAL
DME PARACHUTE ORDER STATUS: NORMAL
DME PARACHUTE SUPPLIER NAME: NORMAL
DME PARACHUTE SUPPLIER PHONE: NORMAL
EOSINOPHIL # BLD AUTO: 0.11 THOUSAND/ÂΜL (ref 0–0.61)
EOSINOPHIL NFR BLD AUTO: 2 % (ref 0–6)
ERYTHROCYTE [DISTWIDTH] IN BLOOD BY AUTOMATED COUNT: 12.2 % (ref 11.6–15.1)
GFR SERPL CREATININE-BSD FRML MDRD: 82 ML/MIN/1.73SQ M
GLUCOSE P FAST SERPL-MCNC: 107 MG/DL (ref 65–99)
HCT VFR BLD AUTO: 48.6 % (ref 36.5–49.3)
HDLC SERPL-MCNC: 50 MG/DL
HGB BLD-MCNC: 16.8 G/DL (ref 12–17)
IMM GRANULOCYTES # BLD AUTO: 0.02 THOUSAND/UL (ref 0–0.2)
IMM GRANULOCYTES NFR BLD AUTO: 0 % (ref 0–2)
LDLC SERPL CALC-MCNC: 152 MG/DL (ref 0–100)
LYMPHOCYTES # BLD AUTO: 1.49 THOUSANDS/ÂΜL (ref 0.6–4.47)
LYMPHOCYTES NFR BLD AUTO: 23 % (ref 14–44)
MCH RBC QN AUTO: 34.5 PG (ref 26.8–34.3)
MCHC RBC AUTO-ENTMCNC: 34.6 G/DL (ref 31.4–37.4)
MCV RBC AUTO: 100 FL (ref 82–98)
MONOCYTES # BLD AUTO: 0.62 THOUSAND/ÂΜL (ref 0.17–1.22)
MONOCYTES NFR BLD AUTO: 9 % (ref 4–12)
NEUTROPHILS # BLD AUTO: 4.28 THOUSANDS/ÂΜL (ref 1.85–7.62)
NEUTS SEG NFR BLD AUTO: 65 % (ref 43–75)
NONHDLC SERPL-MCNC: 195 MG/DL
NRBC BLD AUTO-RTO: 0 /100 WBCS
PLATELET # BLD AUTO: 217 THOUSANDS/UL (ref 149–390)
PMV BLD AUTO: 10.5 FL (ref 8.9–12.7)
POTASSIUM SERPL-SCNC: 4 MMOL/L (ref 3.5–5.3)
PROT SERPL-MCNC: 7.2 G/DL (ref 6.4–8.4)
PSA SERPL-MCNC: 0.73 NG/ML (ref 0–4)
RBC # BLD AUTO: 4.87 MILLION/UL (ref 3.88–5.62)
SODIUM SERPL-SCNC: 139 MMOL/L (ref 135–147)
TRIGL SERPL-MCNC: 217 MG/DL
TSH SERPL DL<=0.05 MIU/L-ACNC: 1.25 UIU/ML (ref 0.45–4.5)
WBC # BLD AUTO: 6.57 THOUSAND/UL (ref 4.31–10.16)

## 2023-09-18 PROCEDURE — 84443 ASSAY THYROID STIM HORMONE: CPT

## 2023-09-18 PROCEDURE — 85025 COMPLETE CBC W/AUTO DIFF WBC: CPT

## 2023-09-18 PROCEDURE — 80061 LIPID PANEL: CPT

## 2023-09-18 PROCEDURE — 36415 COLL VENOUS BLD VENIPUNCTURE: CPT

## 2023-09-18 PROCEDURE — 80053 COMPREHEN METABOLIC PANEL: CPT

## 2023-09-18 PROCEDURE — G0103 PSA SCREENING: HCPCS

## 2023-09-18 RX ORDER — TRIAMTERENE AND HYDROCHLOROTHIAZIDE 37.5; 25 MG/1; MG/1
1 TABLET ORAL DAILY
Qty: 90 TABLET | Refills: 0 | Status: SHIPPED | OUTPATIENT
Start: 2023-09-18

## 2023-09-19 ENCOUNTER — TELEPHONE (OUTPATIENT)
Age: 58
End: 2023-09-19

## 2023-09-19 DIAGNOSIS — J45.40 MODERATE PERSISTENT ASTHMA WITHOUT COMPLICATION: Primary | ICD-10-CM

## 2023-09-19 DIAGNOSIS — U07.1 COVID: ICD-10-CM

## 2023-09-19 RX ORDER — FLUTICASONE FUROATE AND VILANTEROL TRIFENATATE 200; 25 UG/1; UG/1
POWDER RESPIRATORY (INHALATION)
Qty: 180 EACH | Refills: 3 | Status: SHIPPED | OUTPATIENT
Start: 2023-09-19 | End: 2023-09-25

## 2023-09-19 RX ORDER — NIRMATRELVIR AND RITONAVIR 150-100 MG
2 KIT ORAL 2 TIMES DAILY
Qty: 20 TABLET | Refills: 0 | Status: SHIPPED | OUTPATIENT
Start: 2023-09-19 | End: 2023-09-20

## 2023-09-19 NOTE — TELEPHONE ENCOUNTER
Jammie from [facility] called and wanted to know what patient's GFR was and if he was renally impaired. Last labs show 80, she is requesting a new script be sent in for the patients Paxlovid as the regular dose not the renal dose. OUTPATIENT CATHETERIZATION INSTRUCTIONS    You have been scheduled for a procedure in the catheterization lab on Wednesday, December 11, 2019.     Please report to the Cardiology Waiting Area on the Third floor of the hospital and check in at 7 AM.   You will then be taken to the SSCU (Short Stay Cardiac Unit) and prepared for your procedure. Please be aware that this is not the time of your procedure but the time you are to arrive. The procedures are scheduled on an hourly basis; however, emergency cases take precedence over all other cases.       You may not have anything to eat or drink after midnight the night before your test. You may take your regular morning medications with water. If there are any medications that you should not take you will be instructed to hold them that morning. If you are diabetic and on Metformin (Glucophage) do not take it the day before, the day of, and for 2 days after your procedure.      The procedure will take 1-2 hours to perform. After the procedure, you will return to SSCU on the third floor of the hospital. You will need to lie still (or keep your arm still) for the next 4 to 6 hours to minimize bleeding from the puncture site. Your family may remain in the room with you during this time.       You may be able to be discharged home that same afternoon if there is someone to drive you home and there were no complications. If you have one of the balloon, stent, or device procedures you may spend the night in the hospital. Your doctor will determine, based on your progress, the date and time of your discharge. The results of your procedure will be discussed with you before you are discharged. Any further testing or procedures will be scheduled for you either before you leave or you will be called with these appointments.       If you should have any questions, concerns, or need to change the date of your procedure, please call  JULES Mccabe @ [phone]    Special  Instructions:  Continue on meds        THE ABOVE INSTRUCTIONS WERE GIVEN TO THE PATIENT VERBALLY AND THEY VERBALIZED UNDERSTANDING.  THEY DO NOT REQUIRE ANY SPECIAL NEEDS AND DO NOT HAVE ANY LEARNING BARRIERS.          Directions for Reporting to Cardiology Waiting Area in the Hospital  If you park in the Parking Garage:  Take elevators to the1st floor of the parking garage.  Continue past the gift shop, coffee shop, and piano.  Take a right and go to the gold elevators. (Elevator B)  Take the elevator to the 3rd floor.  Follow the arrow on the sign on the wall that says Cath Lab Registration/EP Lab Registration.  Follow the long hallway all the way around until you come to a big open area.  This is the registration area.  Check in at Reception Desk.    OR    If family is dropping you off:  Have them drop you off at the front of the Hospital under the green overhang.  Enter through the doors and take a right.  Take the E elevators to the 3rd floor Cardiology Waiting Area.  Check in at the Reception Desk in the waiting room.

## 2023-09-19 NOTE — TELEPHONE ENCOUNTER
Pt said he tested positive for Covid yesterday. He is interested in getting Paxlovid. Would Dr. Ozzie Galindo be willing to send something in or does pt need a VV. Pt uses [facility]. Thank you.

## 2023-09-20 ENCOUNTER — TELEPHONE (OUTPATIENT)
Age: 58
End: 2023-09-20

## 2023-09-20 ENCOUNTER — PATIENT MESSAGE (OUTPATIENT)
Dept: PULMONOLOGY | Facility: CLINIC | Age: 58
End: 2023-09-20

## 2023-09-20 DIAGNOSIS — J45.40 MODERATE PERSISTENT ASTHMA WITHOUT COMPLICATION: Primary | ICD-10-CM

## 2023-09-20 DIAGNOSIS — U07.1 COVID: Primary | ICD-10-CM

## 2023-09-20 RX ORDER — NIRMATRELVIR AND RITONAVIR 300-100 MG
3 KIT ORAL 2 TIMES DAILY
Qty: 30 TABLET | Refills: 0 | Status: SHIPPED | OUTPATIENT
Start: 2023-09-20 | End: 2023-09-25

## 2023-09-25 RX ORDER — FLUTICASONE FUROATE, UMECLIDINIUM BROMIDE AND VILANTEROL TRIFENATATE 200; 62.5; 25 UG/1; UG/1; UG/1
1 POWDER RESPIRATORY (INHALATION) DAILY
Qty: 180 BLISTER | Refills: 3 | Status: SHIPPED | OUTPATIENT
Start: 2023-09-25 | End: 2023-10-25

## 2023-09-28 LAB

## 2023-10-02 ENCOUNTER — TELEPHONE (OUTPATIENT)
Dept: PULMONOLOGY | Facility: CLINIC | Age: 58
End: 2023-10-02

## 2023-10-02 LAB

## 2023-10-02 NOTE — TELEPHONE ENCOUNTER
Received fax that follow up is needed for PAP therapy per [employer]. Scheduled visit on 12/13/2023 @ 10. Asked Hira Kirkland to call pulmonary at [phone]  if this will not work for him. We will need to fax a copy of progress notes to [facility] equipment [phone].  Progress notes need to occur between 11/02/23 and 12/31/23

## 2023-10-11 ENCOUNTER — OFFICE VISIT (OUTPATIENT)
Dept: FAMILY MEDICINE CLINIC | Facility: MEDICAL CENTER | Age: 58
End: 2023-10-11
Payer: COMMERCIAL

## 2023-10-11 VITALS
WEIGHT: 179 LBS | DIASTOLIC BLOOD PRESSURE: 74 MMHG | SYSTOLIC BLOOD PRESSURE: 124 MMHG | RESPIRATION RATE: 16 BRPM | HEIGHT: 69 IN | BODY MASS INDEX: 26.51 KG/M2 | HEART RATE: 62 BPM

## 2023-10-11 DIAGNOSIS — U09.9 POST-COVID SYNDROME: ICD-10-CM

## 2023-10-11 DIAGNOSIS — G47.33 OSA (OBSTRUCTIVE SLEEP APNEA): ICD-10-CM

## 2023-10-11 DIAGNOSIS — M25.561 CHRONIC PAIN OF RIGHT KNEE: ICD-10-CM

## 2023-10-11 DIAGNOSIS — J45.40 MODERATE PERSISTENT ASTHMA WITHOUT COMPLICATION: ICD-10-CM

## 2023-10-11 DIAGNOSIS — Z00.00 PREVENTATIVE HEALTH CARE: Primary | ICD-10-CM

## 2023-10-11 DIAGNOSIS — E78.00 PURE HYPERCHOLESTEROLEMIA: ICD-10-CM

## 2023-10-11 DIAGNOSIS — F41.1 GAD (GENERALIZED ANXIETY DISORDER): ICD-10-CM

## 2023-10-11 DIAGNOSIS — G89.29 CHRONIC PAIN OF RIGHT KNEE: ICD-10-CM

## 2023-10-11 DIAGNOSIS — I10 ESSENTIAL HYPERTENSION: ICD-10-CM

## 2023-10-11 PROBLEM — R06.09 DYSPNEA ON EXERTION: Status: RESOLVED | Noted: 2022-01-20 | Resolved: 2023-10-11

## 2023-10-11 PROCEDURE — 99213 OFFICE O/P EST LOW 20 MIN: CPT | Performed by: FAMILY MEDICINE

## 2023-10-11 PROCEDURE — 99396 PREV VISIT EST AGE 40-64: CPT | Performed by: FAMILY MEDICINE

## 2023-10-11 NOTE — PROGRESS NOTES
ADULT ANNUAL [facility]    NAME: Chandu Mcbride  AGE: 61 y.o. SEX: male  : 1965     DATE: 10/11/2023     Assessment and Plan:     Problem List Items Addressed This Visit        Respiratory    Moderate persistent asthma without complication     He is seeing pulmonary. He is taking Trelegy once a day and he has a rescue inhaler. Its under good control today. ALEX (obstructive sleep apnea)     He has just started CPAP. He is getting used to it. He thinks it is working well for his hypersomnolence during the day. Cardiovascular and Mediastinum    Essential hypertension     Blood pressure today is 124/74. He is taking triamterene hydrochlorothiazide    Continue that medication, low-salt diet, exercise and keep his weight down. Other    Pure hypercholesterolemia    GABBY (generalized anxiety disorder)     He is taking Lexapro 5 mg daily. It is working well for him. There is no tolerance problems with it. We will continue Lexapro. Post-COVID syndrome     He just got over another case of COVID. The only complaint today is fatigue, however it is getting better because of CPAP. Other Visit Diagnoses     Preventative health care    -  Primary    Chronic pain of right knee        Right knee pain, it interferes with walking. He has had it since spring. X-ray and refer to Ortho    Relevant Orders    Ambulatory Referral to Orthopedic Surgery    XR knee 3 vw right non injury          Immunizations and preventive care screenings were discussed with patient today. Appropriate education was printed on patient's after visit summary. Discussed risks and benefits of prostate cancer screening. We discussed the controversial history of PSA screening for prostate cancer in the [facility] as well as the risk of over detection and over treatment of prostate cancer by way of PSA screening.   The patient understands that PSA blood testing is an imperfect way to screen for prostate cancer and that elevated PSA levels in the blood may also be caused by infection, inflammation, prostatic trauma or manipulation, urological procedures, or by benign prostatic enlargement. The role of the digital rectal examination in prostate cancer screening was also discussed and I discussed with him that there is large interobserver variability in the findings of digital rectal examination. Counseling:  Exercise: the importance of regular exercise/physical activity was discussed. Recommend exercise 3-5 times per week for at least 30 minutes. BMI Counseling: Body mass index is 26.43 kg/m². The BMI is above normal. Nutrition recommendations include decreasing portion sizes, encouraging healthy choices of fruits and vegetables, moderation in carbohydrate intake and reducing intake of saturated and trans fat. Exercise recommendations include moderate physical activity 150 minutes/week and exercising 3-5 times per week. Rationale for BMI follow-up plan is due to patient being overweight or obese. No follow-ups on file. Chief Complaint:     Chief Complaint   Patient presents with   • Annual Exam      History of Present Illness:     Adult Annual Physical   Patient here for a comprehensive physical exam. The patient reports no problems. This is a pleasant-year-old man. He works as an  at [employer]. He is  and he lives with his wife. They have 3 adult children, 2 of them are living with them. He is up-to-date with PSA and colorectal cancer screening    Diet and Physical Activity  Diet/Nutrition: well balanced diet. Exercise: walking. Depression Screening  PHQ-2/9 Depression Screening         General Health  Sleep: sleeps well. Hearing: normal - bilateral.  Vision: no vision problems. Dental: regular dental visits.         Health  Symptoms include: none    Advanced Care Planning  Do you have an advanced directive? no  Do you have a durable medical power of ? no     Review of Systems:     Review of Systems   Constitutional:  Negative for activity change, fatigue and fever. HENT:  Negative for congestion, ear discharge, ear pain, postnasal drip, rhinorrhea, sinus pain, sneezing and sore throat. Eyes:  Negative for photophobia, pain, discharge and redness. Respiratory:  Negative for apnea, cough, shortness of breath and wheezing. Cardiovascular:  Negative for chest pain and palpitations. Gastrointestinal:  Negative for abdominal pain, blood in stool, constipation, diarrhea, nausea and vomiting. Endocrine: Negative for polydipsia, polyphagia and polyuria. Genitourinary:  Negative for decreased urine volume, difficulty urinating, dysuria, frequency, penile discharge, penile pain and urgency. Musculoskeletal:  Positive for arthralgias. Negative for gait problem, joint swelling and neck pain. Right knee pain   Skin:  Negative for color change and rash. Neurological:  Negative for dizziness, tremors, seizures, weakness and headaches. Psychiatric/Behavioral:  Negative for agitation and sleep disturbance. The patient is not nervous/anxious.        Past Medical History:     Past Medical History:   Diagnosis Date   • Allergic    • Asthma    • Depression    • Diverticulitis of colon    • Dyspnea on exertion 01/20/2022   • Hypertension    • Pneumonia 03/2016   • Precordial pain 01/18/2022      Past Surgical History:     Past Surgical History:   Procedure Laterality Date   • APPENDECTOMY     • VASECTOMY      VasDeferens      Family History:     Family History   Problem Relation Age of Onset   • Anxiety disorder Mother    • Hypertension Father    • Hyperlipidemia Father    • No Known Problems Sister    • No Known Problems Sister    • No Known Problems Son    • No Known Problems Son    • No Known Problems Daughter    • Anxiety disorder Family    • Heart disease Family    • Stroke Family       Social History:     Social History     Socioeconomic History   • Marital status: /Civil Union     Spouse name: None   • Number of children: None   • Years of education: None   • Highest education level: None   Occupational History   • None   Tobacco Use   • Smoking status: Never   • Smokeless tobacco: Never   Vaping Use   • Vaping Use: Never used   Substance and Sexual Activity   • Alcohol use: Yes     Alcohol/week: 7.0 standard drinks of alcohol     Types: 7 Glasses of wine per week     Comment: one glass wine per day   • Drug use: Never   • Sexual activity: Yes     Partners: Female     Birth control/protection: Male Sterilization   Other Topics Concern   • None   Social History Narrative    Caffeine Use     Social Determinants of Health     Financial Resource Strain: Not on file   Food Insecurity: Not on file   Transportation Needs: Not on file   Physical Activity: Not on file   Stress: Not on file   Social Connections: Not on file   Intimate Partner Violence: Not on file   Housing Stability: Not on file      Current Medications:     Current Outpatient Medications   Medication Sig Dispense Refill   • diphenhydrAMINE (BENADRYL) 25 mg tablet Take by mouth       • escitalopram (LEXAPRO) 5 mg tablet TAKE 1 TABLET BY MOUTH  DAILY 90 tablet 3   • ezetimibe (ZETIA) 10 mg tablet TAKE 1 TABLET BY MOUTH DAILY 90 tablet 1   • fluticasone-umeclidinium-vilanterol (Trelegy Ellipta) 200-62.5-25 mcg/actuation AEPB inhaler Inhale 1 puff daily Rinse mouth after use.  180 blister 3   • levalbuterol (Xopenex HFA) 45 mcg/act inhaler Inhale 1-2 puffs every 4 (four) hours as needed for wheezing 45 g 3   • multivitamin (THERAGRAN) TABS Take 1 tablet by mouth daily     • Triamcinolone Acetonide (Nasacort Allergy) 55 MCG/ACT nasal spray into each nostril     • triamterene-hydrochlorothiazide (MAXZIDE-25) 37.5-25 mg per tablet TAKE 1 TABLET BY MOUTH  DAILY 90 tablet 0     No current facility-administered medications for this visit. Allergies: Allergies   Allergen Reactions   • Budesonide-Formoterol Fumarate      Category: Adverse Reaction; Annotation - [id number]: Had a effluvium llike response to symbicort. He does do well with oral prednisone, fluticasone NS and short acting beta agonist (ventolin)   • Rosuvastatin Myalgia   • Seasonal Ic [Cholestatin]       Physical Exam:     /74 (BP Location: Left arm, Patient Position: Sitting, Cuff Size: Adult)   Pulse 62   Resp 16   Ht 5' 9" (1.753 m)   Wt 81.2 kg (179 lb)   BMI 26.43 kg/m²     Physical Exam  Vitals and nursing note reviewed. Constitutional:       General: He is not in acute distress. Appearance: Normal appearance. He is well-developed and normal weight. He is not ill-appearing. HENT:      Head: Normocephalic. Right Ear: Tympanic membrane, ear canal and external ear normal.      Left Ear: Tympanic membrane, ear canal and external ear normal.      Nose: Nose normal. No rhinorrhea. Mouth/Throat:      Mouth: Mucous membranes are moist.      Pharynx: Uvula midline. No oropharyngeal exudate. Tonsils: No tonsillar exudate. Eyes:      General: Lids are normal.      Extraocular Movements: Extraocular movements intact. Conjunctiva/sclera: Conjunctivae normal.      Pupils: Pupils are equal, round, and reactive to light. Neck:      Thyroid: No thyromegaly. Vascular: No carotid bruit. Trachea: Trachea normal.   Cardiovascular:      Rate and Rhythm: Normal rate and regular rhythm. Pulses: Normal pulses. Heart sounds: Normal heart sounds, S1 normal and S2 normal. No murmur heard. Pulmonary:      Effort: Pulmonary effort is normal. No respiratory distress. Breath sounds: Normal breath sounds. Abdominal:      General: Bowel sounds are normal.      Palpations: Abdomen is soft. Tenderness: There is no abdominal tenderness. Hernia: No hernia is present. Musculoskeletal:         General: Normal range of motion. Cervical back: Normal range of motion and neck supple. Lymphadenopathy:      Cervical: No cervical adenopathy. Skin:     General: Skin is warm and dry. Neurological:      General: No focal deficit present. Mental Status: He is alert and oriented to person, place, and time. Cranial Nerves: No cranial nerve deficit. Sensory: No sensory deficit. Gait: Gait normal.      Deep Tendon Reflexes: Reflexes are normal and symmetric. Psychiatric:         Speech: Speech normal.         Behavior: Behavior normal. Behavior is cooperative. Thought Content:  Thought content normal.          Salma Buckley MD  [address]

## 2023-10-11 NOTE — ASSESSMENT & PLAN NOTE
He is seeing pulmonary. He is taking Trelegy once a day and he has a rescue inhaler. Its under good control today.

## 2023-10-11 NOTE — ASSESSMENT & PLAN NOTE
He is taking Lexapro 5 mg daily. It is working well for him. There is no tolerance problems with it. We will continue Lexapro.

## 2023-10-11 NOTE — ASSESSMENT & PLAN NOTE
He just got over another case of COVID. The only complaint today is fatigue, however it is getting better because of CPAP.

## 2023-10-11 NOTE — ASSESSMENT & PLAN NOTE
He has just started CPAP. He is getting used to it. He thinks it is working well for his hypersomnolence during the day.

## 2023-10-11 NOTE — ASSESSMENT & PLAN NOTE
Blood pressure today is 124/74. He is taking triamterene hydrochlorothiazide    Continue that medication, low-salt diet, exercise and keep his weight down.

## 2023-10-26 ENCOUNTER — APPOINTMENT (OUTPATIENT)
Dept: RADIOLOGY | Facility: MEDICAL CENTER | Age: 58
End: 2023-10-26
Payer: COMMERCIAL

## 2023-10-26 DIAGNOSIS — M25.561 CHRONIC PAIN OF RIGHT KNEE: ICD-10-CM

## 2023-10-26 DIAGNOSIS — G89.29 CHRONIC PAIN OF RIGHT KNEE: ICD-10-CM

## 2023-10-26 PROCEDURE — 73562 X-RAY EXAM OF KNEE 3: CPT

## 2023-11-06 ENCOUNTER — OFFICE VISIT (OUTPATIENT)
Dept: PULMONOLOGY | Facility: CLINIC | Age: 58
End: 2023-11-06
Payer: COMMERCIAL

## 2023-11-06 VITALS
WEIGHT: 177 LBS | BODY MASS INDEX: 26.22 KG/M2 | TEMPERATURE: 99.4 F | DIASTOLIC BLOOD PRESSURE: 90 MMHG | OXYGEN SATURATION: 99 % | HEART RATE: 84 BPM | SYSTOLIC BLOOD PRESSURE: 138 MMHG | HEIGHT: 69 IN

## 2023-11-06 DIAGNOSIS — J30.2 SEASONAL ALLERGIC RHINITIS, UNSPECIFIED TRIGGER: ICD-10-CM

## 2023-11-06 DIAGNOSIS — G47.33 OSA (OBSTRUCTIVE SLEEP APNEA): ICD-10-CM

## 2023-11-06 DIAGNOSIS — J45.40 MODERATE PERSISTENT ASTHMA WITHOUT COMPLICATION: Primary | ICD-10-CM

## 2023-11-06 PROCEDURE — 99214 OFFICE O/P EST MOD 30 MIN: CPT | Performed by: INTERNAL MEDICINE

## 2023-11-06 NOTE — PROGRESS NOTES
Order placed through [address] [place] [address] for a PAP pressure change via [employer].

## 2023-11-06 NOTE — ASSESSMENT & PLAN NOTE
Modify CPAP to max pressure of 12 with understanding that Sherri Catching may have increased events (currently ~3/night). If ineffective for increasing tolerability, will refer to mask fitting clinic.

## 2023-11-06 NOTE — PROGRESS NOTES
Name: Zoila Bey      : 1965      MRN: [id number]  Encounter Provider: Janene Villareal MD  Encounter Date: 2023   Encounter department: [address] [place] [address] Avenue     1. Moderate persistent asthma without complication  Assessment & Plan:  Continue Trelegy daily, with rescue levalbuterol prn. Follow up 3-4 months      2. ALEX (obstructive sleep apnea)  Assessment & Plan:  Modify CPAP to max pressure of 12 with understanding that Abby Dickerosn may have increased events (currently ~3/night). If ineffective for increasing tolerability, will refer to mask fitting clinic. Orders:  -     PAP DME Pressure Change    3. Seasonal allergic rhinitis, unspecified trigger  Assessment & Plan:  Continue Nasacort spray and Benadryl at night for relief of symptoms         Subjective      HPI  Zoila Bey is a 61 y.o. male who presents for routine follow up. No new concerns today. Asthma: Trelegy daily. Has used rescue inhaler only twice since last appointment 2023. Feels well controlled on medication. Per wife, he is less limited due to shortness of breath. COVID since last visit, is recovered. Was treated with Paxlovid, and had nausea so only got through half of the course. ALEX started on CPAP x1 month ago. Initially with moderate ALEX. Waking up 3-4x per night, and feels very inflated on CPAP machine. He can hear air rushing through the mask and noise wakes him up. Also at times needing to readjust to side at night, which makes air rushing noise worse. Personally maybe starting to feel feel less tired. Ranging from 1-6 hours of use. Compliance report reviewed, AHI 3.4 (from 21). Allergic rhinitis: controlled on Nasacort and benadryl prn. A little phlegmy this morning. Review of Systems   Constitutional:  Negative for chills and fever. HENT:  Positive for rhinorrhea. Negative for sore throat.     Respiratory:  Negative for cough and shortness of breath. Cardiovascular:  Negative for chest pain. Allergic/Immunologic: Positive for environmental allergies. Current Outpatient Medications on File Prior to Visit   Medication Sig    diphenhydrAMINE (BENADRYL) 25 mg tablet Take by mouth      escitalopram (LEXAPRO) 5 mg tablet TAKE 1 TABLET BY MOUTH  DAILY    ezetimibe (ZETIA) 10 mg tablet TAKE 1 TABLET BY MOUTH DAILY    fluticasone-umeclidinium-vilanterol (Trelegy Ellipta) 200-62.5-25 mcg/actuation AEPB inhaler Inhale 1 puff daily Rinse mouth after use. levalbuterol (Xopenex HFA) 45 mcg/act inhaler Inhale 1-2 puffs every 4 (four) hours as needed for wheezing    multivitamin (THERAGRAN) TABS Take 1 tablet by mouth daily    Triamcinolone Acetonide (Nasacort Allergy) 55 MCG/ACT nasal spray into each nostril    triamterene-hydrochlorothiazide (MAXZIDE-25) 37.5-25 mg per tablet TAKE 1 TABLET BY MOUTH  DAILY       Objective     /90 (BP Location: Right arm, Patient Position: Sitting, Cuff Size: Standard)   Pulse 84   Temp 99.4 °F (37.4 °C)   Ht 5' 9" (1.753 m)   Wt 80.3 kg (177 lb)   SpO2 99%   BMI 26.14 kg/m²     Physical Exam  Vitals reviewed. Constitutional:       General: He is not in acute distress. Appearance: Normal appearance. HENT:      Head: Normocephalic and atraumatic. Nose: Nose normal.      Mouth/Throat:      Mouth: Mucous membranes are moist.   Eyes:      Extraocular Movements: Extraocular movements intact. Cardiovascular:      Rate and Rhythm: Normal rate and regular rhythm. Pulses: Normal pulses. Heart sounds: Normal heart sounds. Pulmonary:      Effort: Pulmonary effort is normal. No respiratory distress. Breath sounds: Normal breath sounds. No wheezing. Abdominal:      General: There is no distension. Musculoskeletal:      Right lower leg: No edema. Left lower leg: No edema. Skin:     General: Skin is warm and dry. Neurological:      General: No focal deficit present.       Mental Status: He is alert and oriented to person, place, and time. Psychiatric:         Mood and Affect: Mood normal.         Thought Content: Thought content normal.       Labs:   Lab Results   Component Value Date/Time    CO2 33 (H) 09/18/2023 08:41 AM    SODIUM 139 09/18/2023 08:41 AM    K 4.0 09/18/2023 08:41 AM     09/18/2023 08:41 AM   Imaging: no new imaging to review. [employer] compliance report: 97% of days for avg 6 hours/day used. AHI 3.4. Maximum pressure 14.6, median 10.3.      Issa Marino MD  11/6/2023, 8:50 AM  PGY-1 Family Medicine [facility]

## 2023-11-07 LAB
DME PARACHUTE DELIVERY DATE ACTUAL: NORMAL
DME PARACHUTE DELIVERY DATE REQUESTED: NORMAL
DME PARACHUTE ITEM DESCRIPTION: NORMAL
DME PARACHUTE ORDER STATUS: NORMAL
DME PARACHUTE SUPPLIER NAME: NORMAL
DME PARACHUTE SUPPLIER PHONE: NORMAL

## 2023-11-08 ENCOUNTER — OFFICE VISIT (OUTPATIENT)
Dept: OBGYN CLINIC | Facility: MEDICAL CENTER | Age: 58
End: 2023-11-08
Payer: COMMERCIAL

## 2023-11-08 VITALS
DIASTOLIC BLOOD PRESSURE: 89 MMHG | HEART RATE: 81 BPM | BODY MASS INDEX: 26.22 KG/M2 | HEIGHT: 69 IN | WEIGHT: 177 LBS | SYSTOLIC BLOOD PRESSURE: 147 MMHG

## 2023-11-08 DIAGNOSIS — M25.561 CHRONIC PAIN OF RIGHT KNEE: ICD-10-CM

## 2023-11-08 DIAGNOSIS — S83.241A OTHER TEAR OF MEDIAL MENISCUS, CURRENT INJURY, RIGHT KNEE, INITIAL ENCOUNTER: Primary | ICD-10-CM

## 2023-11-08 DIAGNOSIS — G89.29 CHRONIC PAIN OF RIGHT KNEE: ICD-10-CM

## 2023-11-08 PROCEDURE — 20610 DRAIN/INJ JOINT/BURSA W/O US: CPT | Performed by: PHYSICAL MEDICINE & REHABILITATION

## 2023-11-08 PROCEDURE — 99204 OFFICE O/P NEW MOD 45 MIN: CPT | Performed by: PHYSICAL MEDICINE & REHABILITATION

## 2023-11-08 RX ORDER — ROPIVACAINE HYDROCHLORIDE 5 MG/ML
10 INJECTION, SOLUTION EPIDURAL; INFILTRATION; PERINEURAL
Status: COMPLETED | OUTPATIENT
Start: 2023-11-08 | End: 2023-11-08

## 2023-11-08 RX ORDER — TRIAMCINOLONE ACETONIDE 40 MG/ML
80 INJECTION, SUSPENSION INTRA-ARTICULAR; INTRAMUSCULAR
Status: COMPLETED | OUTPATIENT
Start: 2023-11-08 | End: 2023-11-08

## 2023-11-08 RX ADMIN — TRIAMCINOLONE ACETONIDE 80 MG: 40 INJECTION, SUSPENSION INTRA-ARTICULAR; INTRAMUSCULAR at 09:30

## 2023-11-08 RX ADMIN — ROPIVACAINE HYDROCHLORIDE 10 ML: 5 INJECTION, SOLUTION EPIDURAL; INFILTRATION; PERINEURAL at 09:30

## 2023-11-08 NOTE — PATIENT INSTRUCTIONS
You had a cortisone injection today. Some people also refer to this as steroid or corticosteroid. There are two medicines in this injection, a numbing medicine (anesthetic) and a steroid. Most people get relief immediately but it can take up to two weeks. Rarely, some people can get a flushing reaction where they feel warm and flushed in the face. This is a side effect and resolves on its own. If you experience any drainage, redness or fevers, please give us a call or go to the nearest emergency room. You can obtain diclofenac cream/gel/ointment over the counter. Many brands make this medication and they include Voltaren, Aspercreme and Aleve. You can use this up to 3 times per day. It is best to wash the area with water, pat dry and then apply. The cream absorbs better after this. Be careful not to let any pets or children get in contact with the medication as it can be harmful to them. If you develop a skin reaction, stop using the cream.     You will be starting physical therapy. It is important to do home exercises as given by your physical therapist as you go through PT to speed up your recovery. Physical therapy addresses the problems that are causing your pain, instead of covering them up, as some other treatment options do.

## 2023-11-08 NOTE — PROGRESS NOTES
1. Other tear of medial meniscus, current injury, right knee, initial encounter  Ambulatory referral to Physical Therapy      2. Chronic pain of right knee  Ambulatory Referral to Orthopedic Surgery    Large joint arthrocentesis: R knee    Right knee pain, it interferes with walking. He has had it since spring. X-ray and refer to Ortho        Orders Placed This Encounter   Procedures    Large joint arthrocentesis: R knee    Ambulatory referral to Physical Therapy        Impression:  Right knee pain and effusion likely secondary to medial meniscal derangement. We discussed different treatment options and decided to proceed with an intra-articular steroid injection. He is currently using Voltaren gel which has been helpful. He would benefit from formal physical therapy. I will see him back in 5 weeks if needed. Imaging Studies (I personally reviewed images in PACS and report):  Right knee x-rays most recent to this encounter reviewed. These images show slight medial joint space narrowing with sclerosis. There is a small joint effusion. Return in about 5 weeks (around 12/13/2023). Patient is in agreement with the above plan. HPI:  Darren Palmer is a 61 y.o. male  who presents for evaluation of   Chief Complaint   Patient presents with    Right Knee - Pain     Onset/Mechanism: Chronic pain for several years. Getting worse over the past few months. Location: Medial knee and in the front. Radiation: As above. Provocative: Walking. Severity: Painful. Associated Symptoms: Denies. Treatment so far: No recent treatment.     Following history reviewed and updated:  Past Medical History:   Diagnosis Date    Allergic     Asthma     Depression     Diverticulitis of colon     Dyspnea on exertion 01/20/2022    Hypertension     Pneumonia 03/2016    Precordial pain 01/18/2022     Past Surgical History:   Procedure Laterality Date    APPENDECTOMY      VASECTOMY      VasDeferens     Social History Social History     Substance and Sexual Activity   Alcohol Use Yes    Alcohol/week: 7.0 standard drinks of alcohol    Types: 7 Glasses of wine per week    Comment: one glass wine per day     Social History     Substance and Sexual Activity   Drug Use Never     Social History     Tobacco Use   Smoking Status Never   Smokeless Tobacco Never     Family History   Problem Relation Age of Onset    Anxiety disorder Mother     Hypertension Father     Hyperlipidemia Father     No Known Problems Sister     No Known Problems Sister     No Known Problems Son     No Known Problems Son     No Known Problems Daughter     Anxiety disorder Family     Heart disease Family     Stroke Family      Allergies   Allergen Reactions    Budesonide-Formoterol Fumarate      Category: Adverse Reaction; Annotation - [id number]: Had a effluvium llike response to symbicort. He does do well with oral prednisone, fluticasone NS and short acting beta agonist (ventolin)    Rosuvastatin Myalgia    Seasonal Ic [Cholestatin]         Constitutional:  /89   Pulse 81   Ht 5' 9" (1.753 m)   Wt 80.3 kg (177 lb)   BMI 26.14 kg/m²    General: NAD. Eyes: Anicteric sclerae. Neck: Supple. Lungs: Unlabored breathing. Cardiovascular: No lower extremity edema. Skin: Intact without erythema. Neurologic: Sensation intact to light touch. Psychiatric: Mood and affect are appropriate. Right Knee Exam     Tenderness   The patient is experiencing tenderness in the medial joint line. Range of Motion   Extension:  normal     Tests   Varus: negative Valgus: negative    Other   Erythema: absent  Scars: absent  Sensation: normal  Pulse: present  Swelling: mild  Effusion: effusion present             Large joint arthrocentesis: R knee  Universal Protocol:  Consent: Verbal consent obtained. Written consent not obtained.   Risks and benefits: risks, benefits and alternatives were discussed  Consent given by: patient  Timeout called at: 11/8/2023 9:11 JENNI.  Patient understanding: patient states understanding of the procedure being performed  Patient consent: the patient's understanding of the procedure matches consent given  Site marked: the operative site was marked  Radiology Images displayed and confirmed. If images not available, report reviewed: imaging studies available  Patient identity confirmed: verbally with patient  Supporting Documentation  Indications: pain   Procedure Details  Location: knee - R knee  Needle size: 22 G  Ultrasound guidance: no  Approach: Inferolateral to patella. Medications administered: 80 mg triamcinolone acetonide 40 mg/mL; 10 mL ropivacaine 0.5 %    Patient tolerance: patient tolerated the procedure well with no immediate complications  Dressing:  Sterile dressing applied    There was little to no resistance encountered during the injection. Risks of this procedure include:    - Risk of bleeding since a needle is involved. - Risk of infection (1/10,000 chance as per recent studies). Signs/symptoms were discussed and they would prompt an urgent evaluation at an emergency department.  - Risk of pigmentation or skin dimpling in the skin (2-3% chance as per recent studies) from the steroid. - Risk of increased pain from steroid flare (1% chance as per recent studies) that typically lasts 24-48 hours. - Risk of increased blood sugars from the steroid medication that can last for a few weeks. If the patient is a diabetic or pre-diabetic, they were encouraged to closely monitor their blood sugars and discuss with PCP if elevated more than usual or if having symptoms. The benefits outweigh the risks and so the procedure was completed.

## 2023-11-27 NOTE — PROGRESS NOTES
PT Evaluation     Today's date: 2023  Patient name: Priscilla Will  : 1965  MRN: [id number]  Referring provider: Franklin Lucero DO  Dx:   Encounter Diagnosis     ICD-10-CM    1. Other tear of medial meniscus, current injury, right knee, subsequent encounter  S83.241D                      Assessment  Assessment details: The patient is a 62 y/o male who presents to PT with diagnosis of R knee medial meniscus tear. He has complaints of pain. He demonstrates deficits with decreased ROM and strength, decreased flexibility, antalgic gait, decreased balance and proprioception, difficulty with stair negotiation and pain with completing his ADLs and tasks at home. The patient would benefit from continued PT to address deficits and improve function. Tx to include ROM, stretching, strengthening, modalities, HEP, pt education, postural ed, lifting/body mechanics, neuro re-ed, balance/proprioception Te, MT and equipment. Impairments: abnormal gait, abnormal or restricted ROM, activity intolerance, impaired balance, impaired physical strength, lacks appropriate home exercise program, pain with function and weight-bearing intolerance  Other impairment: decreased flexibility  Functional limitations: difficulty with stair negotiationUnderstanding of Dx/Px/POC: good   Prognosis: good    Goals  STGs:  1. Initiate and complete HEP with verbal cues. 2.  Decrease R knee pain by 25% in 4 weeks. 3.  Improve R knee ROM by 5-10 degrees in 4 weeks. 4.  Improve R LE strength by 1/2 grade in 4 weeks. LTGs:  1. Patient to be I with HEP in 12 weeks. 2. Improve R knee ROM to degrees in 12 weeks to improve function. 3. Improve RLE strength to 5/5 t/o in 12 weeks to improve function. 4. Decrease R knee pain to < or = to /10 with activity in 12 weeks to improve function. 5.  Patient to ambulate with normalized gait pattern in 12 weeks. 6.  Stair negotiation is improved to PLOF in 12 weeks.   7.  Recreational performance is improved to PLOF in 12 weeks. 8.  ADL performance is improved to PLOF in 12 weeks. 9.  Work performance is improved to maximal level of function in 12 weeks. Plan  Plan details: Modalities and therapy interventions prn. Patient would benefit from: skilled physical therapy  Planned modality interventions: cryotherapy and thermotherapy: hydrocollator packs  Planned therapy interventions: IASTM, manual therapy, balance, balance/weight bearing training, neuromuscular re-education, patient education, postural training, self care, strengthening, stretching, therapeutic activities, therapeutic exercise, flexibility, functional ROM exercises, gait training and home exercise program  Frequency: 2x week  Duration in weeks: 12  Plan of Care beginning date: 11/28/2023  Plan of Care expiration date: 2/20/2024  Treatment plan discussed with: patient      Subjective Evaluation    History of Present Illness  Mechanism of injury: He had seen Dr. Ayden Avila on 10/26/23. He had x-rays taken, per patient they were (-). He also had an injection in his knee. He was referred to OPPT and he now presents for his evaluation. He will be going back to see the doctor on for his follow up appointment. From EMR review of x-rays from 10/26/23:  FINDINGS:  There is no acute fracture or dislocation. There is no joint effusion. Mild narrowing of medial tibiofemoral compartment. No lytic or blastic osseous lesion. Soft tissues are unremarkable. IMPRESSION:  No acute osseous abnormality.       Patient Goals  Patient goals for therapy: increased motion, improved balance, decreased pain and increased strength    Pain  Location: R Knee        Objective              Precautions:        Manuals 11/28       R Knee        R Calf, Hams                        Neuro Re-Ed         MONTEZ Thomason        SLS        Tandem Stance        Sidestepping w/TBand                                Ther Ex        NuStep        HR/TR SLR x 3        TKE w/TBand        LAQ        QSets        SAQ        SLR        Bridges        Heel Slides        S/L Hip Abd        Clamshells                                Ther Activity        Stepups F/L        Stepdowns        STS        Gait Training                        Modalities        CP/HP prn

## 2023-11-28 ENCOUNTER — EVALUATION (OUTPATIENT)
Dept: PHYSICAL THERAPY | Age: 58
End: 2023-11-28
Payer: COMMERCIAL

## 2023-11-28 DIAGNOSIS — S83.241D OTHER TEAR OF MEDIAL MENISCUS, CURRENT INJURY, RIGHT KNEE, SUBSEQUENT ENCOUNTER: Primary | ICD-10-CM

## 2023-11-28 DIAGNOSIS — S83.241A OTHER TEAR OF MEDIAL MENISCUS, CURRENT INJURY, RIGHT KNEE, INITIAL ENCOUNTER: ICD-10-CM

## 2023-11-28 PROCEDURE — 97161 PT EVAL LOW COMPLEX 20 MIN: CPT | Performed by: PHYSICAL THERAPIST

## 2023-11-28 PROCEDURE — 97535 SELF CARE MNGMENT TRAINING: CPT | Performed by: PHYSICAL THERAPIST

## 2023-11-28 NOTE — PROGRESS NOTES
PT Evaluation     Today's date: 2023  Patient name: Ceci Menendez  : 1965  MRN: [id number]  Referring provider: Zaid Maldonado DO  Dx:   Encounter Diagnosis     ICD-10-CM    1. Other tear of medial meniscus, current injury, right knee, subsequent encounter  S83.241D                      Assessment  Assessment details: The patient is a 60 y/o male who presents to PT with diagnosis of R knee medial meniscus injury. He has complaints of constant R knee pain, pain is along medial knee. His R knee ROM is WNL at this time but does feel tightness at end range for knee flexion. He demonstrates deficits with slight decreased strength, decreased flexibility, pain with stair negotiation and pain with completing his ADLs and tasks at home. He is I with all tasks but does have increased pain with increased activity or by the end of his work day. He ambulates with use of neoprene knee brace (uses during the day - takes off to sleep) no significant gait deviations noted. He can typically go up and down the steps with reciprocal gait pattern but when his knee is sore he will do them with non-reciprocal gait pattern. TTP is noted along medial joint line. Secondary to pain and above deficits he is limited with his overall mobility and function. At times with difficulty kneeling or squatting 2* pain. The patient would benefit from continued PT to address deficits and improve function. Tx to include ROM, stretching, strengthening, modalities, HEP, pt education, postural ed, lifting/body mechanics, neuro re-ed, balance/proprioception Te, MT and equipment.       Impairments: abnormal gait, abnormal or restricted ROM, activity intolerance, impaired balance, impaired physical strength, lacks appropriate home exercise program, pain with function and weight-bearing intolerance  Other impairment: decreased flexibility  Functional limitations: pain with stair negotiationUnderstanding of Dx/Px/POC: good Prognosis: good    Goals  STGs:  1. Initiate and complete HEP with verbal cues. 2.  Decrease R knee pain by > 25% in 4 weeks. 3.  Improve RLE strength by 1/2 grade in 4 weeks. LTGs:  1. Patient to be I with HEP in 8 weeks. 2. Improve R knee ROM to 0-130 degrees with decreased tightness at end range in 8 weeks to improve function. 3. Improve R LE strength to 5/5 t/o in 8 weeks to improve function. 4. Decrease R knee pain to < or = to 2-3/10 with activity in 8 weeks to improve function. 5.  Stair negotiation is improved to PLOF in 8 weeks. 6.  Recreational performance is improved to PLOF in 8 weeks. 7.  ADL performance is improved to PLOF in 8 weeks. 8.  Work performance is improved to maximal level of function in 8 weeks. Plan  Plan details: Modalities and therapy interventions prn. Patient would benefit from: skilled physical therapy  Planned modality interventions: cryotherapy and thermotherapy: hydrocollator packs  Planned therapy interventions: IASTM, manual therapy, balance, balance/weight bearing training, neuromuscular re-education, patient education, postural training, self care, flexibility, functional ROM exercises, gait training, strengthening, stretching, therapeutic activities, therapeutic exercise, home exercise program and joint mobilization  Frequency: 2x week  Duration in weeks: 8  Plan of Care beginning date: 11/28/2023  Plan of Care expiration date: 1/23/2024  Treatment plan discussed with: patient        Subjective Evaluation    History of Present Illness  Mechanism of injury: The patient states that he had R knee pain for about one year. Recently by the end of the day his knee became sore and he had a hard time walking. He had gone to see his PCP. He had an x-ray taken, per patient it showed some swelling in his knee, not much arthritis. He was referred to see the orthopedic doctor and he had seen Dr. Napoleon Granda. He had seen the doctor on 11/8/23.   He had an injection in his knee, which he notes helped some with the pain. He was referred for OPPT and he now presents for his evaluation. He will be going back to see the doctor on 23 for his follow up appointment. From EMR review from x-ray from 10/26/23:  FINDINGS:  There is no acute fracture or dislocation. There is no joint effusion. Mild narrowing of medial tibiofemoral compartment. No lytic or blastic osseous lesion. Soft tissues are unremarkable. IMPRESSION:  No acute osseous abnormality. He notes that he has been using Voltaren cream a few times a week. He is also using a knee brace during the day, taking off to sleep. The patient states that his pain is intermittent, mostly with stiffness in the morning. Increased pain with activity, by the end of the day or when working, also swelling by the end of the day. Pain is typically along his medial knee. He denies any N&T around R knee. Also with reports of knee clicking and cracking at times. Denies knee giving out on him. Patient Goals  Patient goals for therapy: increased motion, decreased pain and increased strength  Patient goal: "To have less pain, use the brace less, use the Voltaren less."  Pain  At best pain ratin  At worst pain ratin  Location: R Knee - medial knee  Quality: dull ache and throbbing  Alleviating factors: Using brace, Voltaren cream.  Aggravating factors: walking (Kneeling, driving, sitting at his desk)    Social Support  Steps to enter house: yes  Stairs in house: yes (First floor setup)   Lives in: multiple-level home  Lives with: spouse    Employment status: working (Full time engineering - sitting or standing)    Diagnostic Tests  Abnormal x-ray: See above. Treatments  Previous treatment: injection treatment        Objective     Tenderness     Right Knee   Tenderness in the medial joint line. No tenderness in the lateral joint line.      Neurological Testing     Sensation     Knee   Left Knee   Intact: Light touch    Right Knee   Intact: light touch     Active Range of Motion   Left Knee   Flexion: 130 degrees   Extension: 0 degrees     Right Knee   Flexion: 130 (Tight at end range) degrees   Extension: 0 degrees     Additional Active Range of Motion Details  L SLR: 60  R SLR: 60    Mobility   Patellar Mobility:     Right Knee   WFL: medial and lateral    Strength/Myotome Testing     Left Knee   Flexion: 5  Extension: 5  Quadriceps contraction: good    Right Knee   Flexion: 4+  Extension: 4  Quadriceps contraction: good    Tests     Right Knee   Negative anterior drawer, valgus stress test at 30 degrees and varus stress test at 30 degrees. Ambulation   Weight-Bearing Status     Additional Weight-Bearing Status Details  Using neoprene knee brace during the day. Ambulation: Level Surfaces   Ambulation without assistive device: independent    Ambulation: Stairs   Ascend stairs: independent  Pattern: reciprocal  Descend stairs: independent  Pattern: reciprocal    Additional Stairs Ambulation Details  Typically with reciprocal gait pattern but with increased pain will go up and down with non-reciprocal gait pattern. Observational Gait   Gait: within functional limits                 POC expires Unit limit Auth Expiration date PT/OT/ST + Visit Limit?    1/23/24 N/A N/A 50                           Visit/Unit Tracking  AUTH Status:  Date 11/28              50 Visit Limit Used 1               Remaining  49                    Precautions: BOOTH, HTN, Depression  [employer] Access Code: [email or web address]       Manuals 11/28       R Knee        R Hams, Calf                        Neuro Re-Ed         BOSU Lunges        SLS        Tandem Stance        Sidestepping                                Ther Ex        NuStep vs Bike        SLR x 3        TKE into ball        LAQ        QSets HEP       SAQ        SLR HEP       Bridges HEP       S/L Hip Abd HEP       S/L Clamshells        Hams Stretch Supine and Seated HEP Ther Activity        Stepups F/L        Stepdowns        STS        Gait Training                        Modalities        CP prn                          Access Code: [email or web address]  URL: [email or web address]  Date: 11/28/2023  Prepared by: [address] [place] [address] Sitting Quad Set  - 1 x daily - 7 x weekly - 2 sets - 10 reps - 3-5" hold  - Supine Bridge  - 1 x daily - 7 x weekly - 2 sets - 10 reps  - Supine Active Straight Leg Raise  - 1 x daily - 7 x weekly - 2 sets - 10 reps  - Sidelying Hip Abduction  - 1 x daily - 7 x weekly - 2 sets - 10 reps  - Supine Hamstring Stretch with Strap  - 1 x daily - 7 x weekly - 1 sets - 5 reps - 20-30" hold  - Seated Hamstring Stretch  - 1 x daily - 7 x weekly - 1 sets - 5 reps - 20-30" hold

## 2023-12-04 ENCOUNTER — OFFICE VISIT (OUTPATIENT)
Dept: PHYSICAL THERAPY | Age: 58
End: 2023-12-04
Payer: COMMERCIAL

## 2023-12-04 DIAGNOSIS — S83.241D OTHER TEAR OF MEDIAL MENISCUS, CURRENT INJURY, RIGHT KNEE, SUBSEQUENT ENCOUNTER: Primary | ICD-10-CM

## 2023-12-04 DIAGNOSIS — S83.241A OTHER TEAR OF MEDIAL MENISCUS, CURRENT INJURY, RIGHT KNEE, INITIAL ENCOUNTER: ICD-10-CM

## 2023-12-04 PROCEDURE — 97110 THERAPEUTIC EXERCISES: CPT

## 2023-12-04 PROCEDURE — 97140 MANUAL THERAPY 1/> REGIONS: CPT

## 2023-12-04 NOTE — PROGRESS NOTES
Daily Note     Today's date: 2023  Patient name: Luciano Cancer  : 1965  MRN: [id number]  Referring provider: Natasha Pleitez DO  Dx:   Encounter Diagnosis     ICD-10-CM    1. Other tear of medial meniscus, current injury, right knee, subsequent encounter  S83.241D       2. Other tear of medial meniscus, current injury, right knee, initial encounter  S83.241A                      Subjective: pt reports doing HEP when he "remembers",       Objective: See treatment diary below      Assessment: ex progressions as per PT POC, pt mae fairly well with min fatigue noted, cueing at times for proper technique       Plan: Continue per plan of care. Progress treatment as tolerated. POC expires Unit limit Auth Expiration date PT/OT/ST + Visit Limit?    24 N/A N/A 50                           Visit/Unit Tracking  AUTH Status:  Date              50 Visit Limit Used 1 2              Remaining  52 48                   Precautions: BOOTH, HTN, Depression  [employer] Access Code: [email or web address]       Manuals       R Knee  VK      R Hams, Calf  VK                      Neuro Re-Ed         BOSU Lunges        SLS        Tandem Stance        Sidestepping                                Ther Ex        NuStep vs Bike  UB 10'      SLR x 3        TKE into ball  2x10x5"      LAQ  2x10x5"      QSets HEP 3x10x5"      SAQ        SLR HEP 2x10      Bridges HEP 2x10x5"      S/L Hip Abd HEP 2x10      S/L Clamshells  2x10x3"      Hams Stretch Supine and Seated HEP                       Ther Activity        Stepups F/L  6" 2x10 ea      Stepdowns        STS  nv      Gait Training                        Modalities        CP prn

## 2023-12-07 ENCOUNTER — OFFICE VISIT (OUTPATIENT)
Dept: PHYSICAL THERAPY | Age: 58
End: 2023-12-07
Payer: COMMERCIAL

## 2023-12-07 DIAGNOSIS — S83.241D OTHER TEAR OF MEDIAL MENISCUS, CURRENT INJURY, RIGHT KNEE, SUBSEQUENT ENCOUNTER: Primary | ICD-10-CM

## 2023-12-07 DIAGNOSIS — S83.241A OTHER TEAR OF MEDIAL MENISCUS, CURRENT INJURY, RIGHT KNEE, INITIAL ENCOUNTER: ICD-10-CM

## 2023-12-07 PROCEDURE — 97140 MANUAL THERAPY 1/> REGIONS: CPT | Performed by: PHYSICAL THERAPIST

## 2023-12-07 PROCEDURE — 97112 NEUROMUSCULAR REEDUCATION: CPT | Performed by: PHYSICAL THERAPIST

## 2023-12-07 PROCEDURE — 97110 THERAPEUTIC EXERCISES: CPT | Performed by: PHYSICAL THERAPIST

## 2023-12-07 NOTE — PROGRESS NOTES
Daily Note     Today's date: 2023  Patient name: Shama Munoz  : 1965  MRN: [id number]  Referring provider: Deion Husain DO  Dx:   Encounter Diagnosis     ICD-10-CM    1. Other tear of medial meniscus, current injury, right knee, subsequent encounter  S83.241D       2. Other tear of medial meniscus, current injury, right knee, initial encounter  S83.241A           Start Time: 1615  Stop Time: 1700  Total time in clinic (min): 45 minutes    Subjective: Pt reports some increased soreness following therapeutic interventions. Objective: See treatment diary below      Assessment: Tolerated treatment well. POC progressed to include more proximal strengthening. Patient demonstrated fatigue post treatment and would benefit from continued PT      Plan: Continue per plan of care. POC expires Unit limit Auth Expiration date PT/OT/ST + Visit Limit?    24 N/A N/A 50                           Visit/Unit Tracking  AUTH Status:  Date             50 Visit Limit Used 1 2 3             Remaining  49 48 52                  Precautions: BOOTH, HTN, Depression  [employer] Access Code: [email or web address]       Manuals       R Knee  JF      R Hams, Calf        AP knee  JF              Neuro Re-Ed         BOSU Lunges        SLS        Tandem Stance        Sidestepping                                Ther Ex        NuStep vs Bike  UB 10'      SLR x 3        TKE into ball        LAQ  2x10x5"      QSets HEP       SAQ        SLR HEP 3x10      Bridges HEP 3x10x5"      S/L Hip Abd HEP 3x10      S/L Clamshells        Hams Stretch Supine and Seated HEP       Stair lunge   2x10x5"              Ther Activity        Stepups F/L        Stepdowns        STS  nv      Gait Training                        Modalities        CP prn

## 2023-12-11 ENCOUNTER — APPOINTMENT (OUTPATIENT)
Dept: PHYSICAL THERAPY | Age: 58
End: 2023-12-11
Payer: COMMERCIAL

## 2023-12-12 ENCOUNTER — OFFICE VISIT (OUTPATIENT)
Dept: PHYSICAL THERAPY | Age: 58
End: 2023-12-12
Payer: COMMERCIAL

## 2023-12-12 DIAGNOSIS — S83.241A OTHER TEAR OF MEDIAL MENISCUS, CURRENT INJURY, RIGHT KNEE, INITIAL ENCOUNTER: ICD-10-CM

## 2023-12-12 DIAGNOSIS — S83.241D OTHER TEAR OF MEDIAL MENISCUS, CURRENT INJURY, RIGHT KNEE, SUBSEQUENT ENCOUNTER: Primary | ICD-10-CM

## 2023-12-12 PROCEDURE — 97140 MANUAL THERAPY 1/> REGIONS: CPT | Performed by: PHYSICAL THERAPIST

## 2023-12-12 PROCEDURE — 97110 THERAPEUTIC EXERCISES: CPT | Performed by: PHYSICAL THERAPIST

## 2023-12-12 PROCEDURE — 97112 NEUROMUSCULAR REEDUCATION: CPT | Performed by: PHYSICAL THERAPIST

## 2023-12-12 NOTE — PROGRESS NOTES
Daily Note     Today's date: 2023  Patient name: Ceci Menendez  : 1965  MRN: [id number]  Referring provider: Zaid Maldonado DO  Dx:   Encounter Diagnosis     ICD-10-CM    1. Other tear of medial meniscus, current injury, right knee, subsequent encounter  S83.241D       2. Other tear of medial meniscus, current injury, right knee, initial encounter  S83.241A           Start Time: 1615  Stop Time: 1700  Total time in clinic (min): 45 minutes    Subjective: Pt reports slight improvements in symptoms. Objective: See treatment diary below      Assessment: Tolerated treatment well. Pt's POC was progressed to include more neuro re-ed interventions. Patient demonstrated fatigue post treatment and would benefit from continued PT      Plan: Continue per plan of care. POC expires Unit limit Auth Expiration date PT/OT/ST + Visit Limit?    24 N/A N/A 50                           Visit/Unit Tracking  AUTH Status:  Date             50 Visit Limit Used 1 2 3             Remaining  52 48 52                  Precautions: BOOTH, HTN, Depression  [employer] Access Code: [email or web address]       Manuals      R Knee PROM  JF JF     R Hams, Calf        AP knee  JF JF             Neuro Re-Ed         BOSU Lunges   20x5"     SLS        Tandem Stance        Sidestepping        Leg press BLE and SL   115# 3x10  65# 3x10                     Ther Ex       NuStep vs Bike  UB 10' RB 10' ROM             TKE into ball        LAQ  2x10x5"      QSets HEP       SAQ        SLR HEP 3x10 3x10     Bridges HEP 3x10x5" 3x10x5"     S/L Hip Abd HEP 3x10      S/L Clamshells        Standing calf stretch   15x10"     Stair lunge   2x10x5"              Ther Activity        Stepups F/L        Stepdowns        STS  nv      Gait Training                        Modalities        CP prn

## 2023-12-14 ENCOUNTER — OFFICE VISIT (OUTPATIENT)
Dept: OBGYN CLINIC | Facility: MEDICAL CENTER | Age: 58
End: 2023-12-14
Payer: COMMERCIAL

## 2023-12-14 VITALS — HEART RATE: 71 BPM | SYSTOLIC BLOOD PRESSURE: 144 MMHG | DIASTOLIC BLOOD PRESSURE: 83 MMHG

## 2023-12-14 DIAGNOSIS — S83.241A OTHER TEAR OF MEDIAL MENISCUS, CURRENT INJURY, RIGHT KNEE, INITIAL ENCOUNTER: Primary | ICD-10-CM

## 2023-12-14 PROCEDURE — 99214 OFFICE O/P EST MOD 30 MIN: CPT | Performed by: PHYSICAL MEDICINE & REHABILITATION

## 2023-12-14 RX ORDER — MELOXICAM 15 MG/1
15 TABLET ORAL DAILY PRN
Qty: 60 TABLET | Refills: 0 | Status: SHIPPED | OUTPATIENT
Start: 2023-12-14

## 2023-12-14 NOTE — PROGRESS NOTES
1. Other tear of medial meniscus, current injury, right knee, initial encounter  meloxicam (MOBIC) 15 mg tablet        No orders of the defined types were placed in this encounter. Impression:  Patient is here in follow up of right knee pain and effusion likely secondary to medial meniscal derangement. Treatment has included physical therapy and intra-articular steroid injection. His symptoms have improved but he still has some discomfort. He will continue with PT and try meloxicam if needed. He will reach out in a few weeks. If still having pain, will obtain right knee MRI. Imaging Studies (I personally reviewed images in PACS and report):  Right knee x-rays most recent to this encounter reviewed. These images show slight medial joint space narrowing with sclerosis. There is a small joint effusion. No follow-ups on file. Patient is in agreement with the above plan. HPI:  Maye Tejeda is a 61 y.o. male  who presents in follow up. Here for   Chief Complaint   Patient presents with    Right Knee - Follow-up, Pain     Pt reports he had some relief from csi but is still having some soreness         Since last visit: See above. Pain is worse at the end of the day. Before it was throughout the day. The injection helped after a while. The physical therapy has been helpful. He takes Tylenol here and there.     Following history reviewed and updated:  Past Medical History:   Diagnosis Date    Allergic     Asthma     Depression     Diverticulitis of colon     Dyspnea on exertion 01/20/2022    Hypertension     Pneumonia 03/2016    Precordial pain 01/18/2022     Past Surgical History:   Procedure Laterality Date    APPENDECTOMY      VASECTOMY      VasDeferens     Social History   Social History     Substance and Sexual Activity   Alcohol Use Yes    Alcohol/week: 7.0 standard drinks of alcohol    Types: 7 Glasses of wine per week    Comment: one glass wine per day     Social History Substance and Sexual Activity   Drug Use Never     Social History     Tobacco Use   Smoking Status Never   Smokeless Tobacco Never     Family History   Problem Relation Age of Onset    Anxiety disorder Mother     Hypertension Father     Hyperlipidemia Father     No Known Problems Sister     No Known Problems Sister     No Known Problems Son     No Known Problems Son     No Known Problems Daughter     Anxiety disorder Family     Heart disease Family     Stroke Family      Allergies   Allergen Reactions    Budesonide-Formoterol Fumarate      Category: Adverse Reaction; Annotation - [id number]: Had a effluvium llike response to symbicort. He does do well with oral prednisone, fluticasone NS and short acting beta agonist (ventolin)    Rosuvastatin Myalgia    Seasonal Ic [Cholestatin]         Constitutional:  /83   Pulse 71    General: NAD. Eyes: Clear sclerae. ENT: No inflammation, lesion, or mass of lips. No tracheal deviation. Musculoskeletal: As mentioned below. Integumentary: No visible rashes or skin lesions. Pulmonary/Chest: Effort normal. No respiratory distress. Neuro: CN's grossly intact, KATE. Psych: Normal affect and judgement. Vascular: WWP. Right Knee Exam     Tenderness   The patient is experiencing tenderness in the medial joint line.     Range of Motion   Extension:  normal     Tests   Yoan:  Medial - positive   Varus: negative Valgus: negative    Other   Erythema: absent  Scars: absent  Sensation: normal  Pulse: present  Swelling: none  Effusion: no effusion present             Procedures

## 2023-12-15 ENCOUNTER — OFFICE VISIT (OUTPATIENT)
Dept: PHYSICAL THERAPY | Age: 58
End: 2023-12-15
Payer: COMMERCIAL

## 2023-12-15 DIAGNOSIS — S83.241A OTHER TEAR OF MEDIAL MENISCUS, CURRENT INJURY, RIGHT KNEE, INITIAL ENCOUNTER: ICD-10-CM

## 2023-12-15 DIAGNOSIS — S83.241D OTHER TEAR OF MEDIAL MENISCUS, CURRENT INJURY, RIGHT KNEE, SUBSEQUENT ENCOUNTER: Primary | ICD-10-CM

## 2023-12-15 PROCEDURE — 97112 NEUROMUSCULAR REEDUCATION: CPT

## 2023-12-15 PROCEDURE — 97140 MANUAL THERAPY 1/> REGIONS: CPT

## 2023-12-15 PROCEDURE — 97110 THERAPEUTIC EXERCISES: CPT

## 2023-12-15 NOTE — PROGRESS NOTES
Daily Note     Today's date: 12/15/2023  Patient name: Maye Tejeda  : 1965  MRN: [id number]  Referring provider: Marina Salazar DO  Dx:   Encounter Diagnosis     ICD-10-CM    1. Other tear of medial meniscus, current injury, right knee, subsequent encounter  S83.241D       2. Other tear of medial meniscus, current injury, right knee, initial encounter  S83.241A           Start Time: 08  Stop Time: 852  Total time in clinic (min): 44 minutes    Subjective: Patient denies any complaints of pain presently but does report soreness and muscular fatigue after previous session. Objective: See treatment diary below      Assessment: Added step ups and TKE at wall as indicated below. Patient appeared to tolerate treatment well although muscular fatigue noted and reported afterwards. ROM WNL. Patient demonstrated fatigue post treatment, exhibited good technique with therapeutic exercises, and would benefit from continued PT Advance quad strength. Plan: Continue per plan of care. Progress treatment as tolerated. POC expires Unit limit Auth Expiration date PT/OT/ST + Visit Limit?    24 N/A N/A 50                                 Visit/Unit Tracking  AUTH Status:  Date 11/28 12/4 12/7 12/12 12/15          50 Visit Limit Used 1 2 3 4 5           Remaining  49 48 47 46 45                Precautions: BOOTH, HTN, Depression  [employer] Access Code: [email or web address]       Manuals 11/28 12/7 12/12 12/15    R Knee PROM  JF JF JL    R Hams, Calf        AP knee  JF JF             Neuro Re-Ed         BOSU Lunges   20x5" 5" x20 BLE    SLS        Tandem Stance        Sidestepping        Leg press BLE and SL   115# 3x10  65# 3x10 115# 3x10  65# 3x10                    Ther Ex  12/7 12/12 12/15    NuStep vs Bike  UB 10' RB 10' ROM UB L4 10min            TKE into ball    5" 3x10    LAQ  2x10x5"      QSets HEP       SAQ        SLR HEP 3x10 3x10 3" 3x10    Bridges HEP 3x10x5" 3x10x5" 5" 3x10    S/L Hip Abd HEP 3x10      S/L Clamshells        Standing calf stretch   15x10" 10"x10    Stair lunge   2x10x5"              Ther Activity  12/7  12/15    Stepups F/L    6" 2x10    Stepdowns        STS  nv      Gait Training                        Modalities        CP prn

## 2023-12-18 ENCOUNTER — APPOINTMENT (OUTPATIENT)
Dept: PHYSICAL THERAPY | Age: 58
End: 2023-12-18
Payer: COMMERCIAL

## 2023-12-21 ENCOUNTER — OFFICE VISIT (OUTPATIENT)
Dept: PHYSICAL THERAPY | Age: 58
End: 2023-12-21
Payer: COMMERCIAL

## 2023-12-21 DIAGNOSIS — S83.241D OTHER TEAR OF MEDIAL MENISCUS, CURRENT INJURY, RIGHT KNEE, SUBSEQUENT ENCOUNTER: ICD-10-CM

## 2023-12-21 DIAGNOSIS — S83.241A OTHER TEAR OF MEDIAL MENISCUS, CURRENT INJURY, RIGHT KNEE, INITIAL ENCOUNTER: Primary | ICD-10-CM

## 2023-12-21 PROCEDURE — 97140 MANUAL THERAPY 1/> REGIONS: CPT | Performed by: PHYSICAL THERAPIST

## 2023-12-21 PROCEDURE — 97112 NEUROMUSCULAR REEDUCATION: CPT | Performed by: PHYSICAL THERAPIST

## 2023-12-21 PROCEDURE — 97110 THERAPEUTIC EXERCISES: CPT | Performed by: PHYSICAL THERAPIST

## 2023-12-21 NOTE — PROGRESS NOTES
"Daily Note     Today's date: 2023  Patient name: Sean Thomas  : 1965  MRN: [id number]  Referring provider: Junior Perkins DO  Dx:   Encounter Diagnosis     ICD-10-CM    1. Other tear of medial meniscus, current injury, right knee, initial encounter  S83.241A       2. Other tear of medial meniscus, current injury, right knee, subsequent encounter  S83.241D           Start Time: 1615  Stop Time: 1700  Total time in clinic (min): 45 minutes    Subjective: Pt reports improvements when taking meloxicam.       Objective: See treatment diary below      Assessment: Tolerated treatment well. Added lunge of step and distraction mob to increase tolerance to TE.  Patient demonstrated fatigue post treatment and would benefit from continued PT      Plan: Continue per plan of care.      POC expires Unit limit Auth Expiration date PT/OT/ST + Visit Limit?   24 N/A N/A 50                                 Visit/Unit Tracking  AUTH Status:  Date 11/28 12/4 12/7 12/12 12/15 12/21         50 Visit Limit Used 1 2 3 4 5 6          Remaining  49 48 47 46 45 44               Precautions: BOOTH, HTN, Depression  [employer] Access Code: [email or web address]       Manuals 11/28 12/7 12/12 12/15 12/21   R Knee PROM  JF JF JL JF   R Hams, Calf        AP knee  JF JF     Knee distraction      JF   Neuro Re-Ed         BOSU Lunges   20x5\" 5\" x20 BLE    SLS        Tandem Stance        Sidestepping        Leg press BLE and SL   115# 3x10  65# 3x10 115# 3x10  65# 3x10 115# 3x10                     Ther Ex  12/7 12/12 12/15    NuStep vs Bike  UB 10' RB 10' ROM UB L4 10min 10 mins           TKE into ball    5\" 3x10    lunges     15x b/l           SAQ        SLR HEP 3x10 3x10 3\" 3x10 2# 3x10   Bridges HEP 3x10x5\" 3x10x5\" 5\" 3x10    S/L Hip Abd HEP 3x10      S/L Clamshells        Standing calf stretch   15x10\" 10\"x10    Stair lunge   2x10x5\"   15x5\" b/l           Ther Activity  12/7  12/15    Stepups F/L    6\" 2x10    Stepdowns        STS  " nv      Gait Training                        Modalities        CP prn

## 2023-12-25 DIAGNOSIS — I10 ESSENTIAL HYPERTENSION: ICD-10-CM

## 2023-12-25 DIAGNOSIS — F43.23 ADJUSTMENT REACTION WITH ANXIETY AND DEPRESSION: ICD-10-CM

## 2023-12-26 ENCOUNTER — OFFICE VISIT (OUTPATIENT)
Dept: PHYSICAL THERAPY | Age: 58
End: 2023-12-26
Payer: COMMERCIAL

## 2023-12-26 DIAGNOSIS — S83.241A OTHER TEAR OF MEDIAL MENISCUS, CURRENT INJURY, RIGHT KNEE, INITIAL ENCOUNTER: Primary | ICD-10-CM

## 2023-12-26 DIAGNOSIS — S83.241D OTHER TEAR OF MEDIAL MENISCUS, CURRENT INJURY, RIGHT KNEE, SUBSEQUENT ENCOUNTER: ICD-10-CM

## 2023-12-26 PROCEDURE — 97110 THERAPEUTIC EXERCISES: CPT | Performed by: PHYSICAL THERAPIST

## 2023-12-26 PROCEDURE — 97112 NEUROMUSCULAR REEDUCATION: CPT | Performed by: PHYSICAL THERAPIST

## 2023-12-26 PROCEDURE — 97140 MANUAL THERAPY 1/> REGIONS: CPT | Performed by: PHYSICAL THERAPIST

## 2023-12-26 RX ORDER — ESCITALOPRAM OXALATE 5 MG/1
TABLET ORAL
Qty: 90 TABLET | Refills: 1 | Status: SHIPPED | OUTPATIENT
Start: 2023-12-26

## 2023-12-26 RX ORDER — TRIAMTERENE AND HYDROCHLOROTHIAZIDE 37.5; 25 MG/1; MG/1
1 TABLET ORAL DAILY
Qty: 90 TABLET | Refills: 1 | Status: SHIPPED | OUTPATIENT
Start: 2023-12-26

## 2023-12-26 NOTE — PROGRESS NOTES
"Daily Note     Today's date: 2023  Patient name: Sean Thomas  : 1965  MRN: [id number]  Referring provider: Junior Perkins DO  Dx:   Encounter Diagnosis     ICD-10-CM    1. Other tear of medial meniscus, current injury, right knee, initial encounter  S83.241A       2. Other tear of medial meniscus, current injury, right knee, subsequent encounter  S83.241D                      Subjective: patient reports his leg is a little tired and sore from [place] around the mall prior to attending PT today.      Objective: See treatment diary below      Assessment: Tolerated treatment well. Patient demonstrated fatigue post treatment, exhibited good technique with therapeutic exercises, and would benefit from continued PT      Plan: Continue per plan of care.  Progress treatment as tolerated.       POC expires Unit limit Auth Expiration date PT/OT/ST + Visit Limit?   24 N/A N/A 50                                 Visit/Unit Tracking  AUTH Status:  Date 11/28 12/4 12/7 12/12 12/15 12/21 12/26        50 Visit Limit Used 1 2 3 4 5 6 7         Remaining  49 48 47 46 45 44 45              Precautions: BOOTH, HTN, Depression  [employer] Access Code: [email or web address]       Manuals 11/28 12/7 12/12 12/15 12/21 12/26   R Knee PROM  JF JF JL JF SK   R Hams, Calf         AP knee  JF JF      Knee distraction      JF    Neuro Re-Ed          BOSU Lunges   20x5\" 5\" x20 BLE     SLS         Tandem Stance         Sidestepping         Leg press BLE and SL   115# 3x10  65# 3x10 115# 3x10  65# 3x10 115# 3x10   115# 3x10  65# 3x10                     Ther Ex  12/7 12/12 12/15     NuStep vs Bike  UB 10' RB 10' ROM UB L4 10min 10 mins 10 min UB            TKE into ball    5\" 3x10     lunges     15x b/l 15x b/l            SAQ         SLR HEP 3x10 3x10 3\" 3x10 2# 3x10 2# 3x10   Bridges HEP 3x10x5\" 3x10x5\" 5\" 3x10     S/L Hip Abd HEP 3x10       S/L Clamshells         Standing calf stretch   15x10\" 10\"x10  10\"x10   Stair lunge   2x10x5\" " "  15x5\" b/l              Ther Activity  12/7  12/15     Stepups F/L    6\" 2x10   6\" 2x10   Stepdowns         STS  nv       Gait Training                           Modalities         CP prn                                        "

## 2023-12-27 ENCOUNTER — TELEPHONE (OUTPATIENT)
Age: 58
End: 2023-12-27

## 2023-12-27 DIAGNOSIS — S83.241A OTHER TEAR OF MEDIAL MENISCUS, CURRENT INJURY, RIGHT KNEE, INITIAL ENCOUNTER: Primary | ICD-10-CM

## 2023-12-27 NOTE — TELEPHONE ENCOUNTER
Caller: Patient    Doctor: Maddie    Reason for call: Patient states that his right knee is painful. Pain changes throughout the day. At it's worst pain is 8/10. Patient requests a MRI. Please call to advise when MRI is approved    Call back#: [phone]

## 2023-12-27 NOTE — TELEPHONE ENCOUNTER
Patient advised. Number for central scheduling given. Patient will call our office once MRI is scheduled to set up f/u after.

## 2023-12-28 ENCOUNTER — OFFICE VISIT (OUTPATIENT)
Dept: PHYSICAL THERAPY | Age: 58
End: 2023-12-28
Payer: COMMERCIAL

## 2023-12-28 DIAGNOSIS — S83.241D OTHER TEAR OF MEDIAL MENISCUS, CURRENT INJURY, RIGHT KNEE, SUBSEQUENT ENCOUNTER: ICD-10-CM

## 2023-12-28 DIAGNOSIS — S83.241A OTHER TEAR OF MEDIAL MENISCUS, CURRENT INJURY, RIGHT KNEE, INITIAL ENCOUNTER: Primary | ICD-10-CM

## 2023-12-28 PROCEDURE — 97112 NEUROMUSCULAR REEDUCATION: CPT | Performed by: PHYSICAL THERAPIST

## 2023-12-28 PROCEDURE — 97140 MANUAL THERAPY 1/> REGIONS: CPT | Performed by: PHYSICAL THERAPIST

## 2023-12-28 PROCEDURE — 97110 THERAPEUTIC EXERCISES: CPT | Performed by: PHYSICAL THERAPIST

## 2023-12-28 NOTE — PROGRESS NOTES
"Daily Note     Today's date: 2023  Patient name: Sean Thomas  : 1965  MRN: [id number]  Referring provider: Junior Perkins DO  Dx:   Encounter Diagnosis     ICD-10-CM    1. Other tear of medial meniscus, current injury, right knee, initial encounter  S83.241A       2. Other tear of medial meniscus, current injury, right knee, subsequent encounter  S83.241D           Start Time: 1615  Stop Time: 1700  Total time in clinic (min): 45 minutes    Subjective: Pt reports increased symptoms since last visit.       Objective: See treatment diary below      Assessment: Tolerated treatment fair. Patient demonstrated fatigue post treatment and patient will be put on hold until after MRI and consult with orthopedic due to lack of improvements.      Plan:  Hold on PT till after consult with orthopedic.      POC expires Unit limit Auth Expiration date PT/OT/ST + Visit Limit?   24 N/A N/A 50                                 Visit/Unit Tracking  AUTH Status:  Date 11/28 12/4 12/7 12/12 12/15 12/21 12/26 12/28       50 Visit Limit Used 1 2 3 4 5 6 7 8        Remaining  49 48 47 46 45 44 43 42             Precautions: BOOTH, HTN, Depression  [employer] Access Code: [email or web address]       Manuals 11/28 12/7 12/12 12/15 12/21 12/28   R Knee PROM  JF JF JL JF JF   R Hams, Calf         AP knee  JF JF      Knee distraction      JF    Neuro Re-Ed          BOSU Lunges   20x5\" 5\" x20 BLE     SLS         Tandem Stance         Sidestepping         Leg press BLE and SL   115# 3x10  65# 3x10 115# 3x10  65# 3x10 115# 3x10   115# 3x10  65# 3x10                     Ther Ex  12/7 12/12 12/15     NuStep vs Bike  UB 10' RB 10' ROM UB L4 10min 10 mins 10 min UB            TKE into ball    5\" 3x10     lunges     15x b/l 15x b/l            SAQ         SLR HEP 3x10 3x10 3\" 3x10 2# 3x10 2# 3x10   Bridges HEP 3x10x5\" 3x10x5\" 5\" 3x10     S/L Hip Abd HEP 3x10       S/L Clamshells         Standing calf stretch   15x10\" 10\"x10  10\"x10   Stair " "lunge   2x10x5\"   15x5\" b/l              Ther Activity  12/7  12/15     Stepups F/L    6\" 2x10   6\" 2x10   Stepdowns         STS  nv       Gait Training                           Modalities         CP prn                                          "

## 2023-12-29 ENCOUNTER — HOSPITAL ENCOUNTER (OUTPATIENT)
Dept: MRI IMAGING | Facility: HOSPITAL | Age: 58
End: 2023-12-29
Attending: PHYSICAL MEDICINE & REHABILITATION
Payer: COMMERCIAL

## 2023-12-29 DIAGNOSIS — S83.241A OTHER TEAR OF MEDIAL MENISCUS, CURRENT INJURY, RIGHT KNEE, INITIAL ENCOUNTER: ICD-10-CM

## 2023-12-29 PROCEDURE — G1004 CDSM NDSC: HCPCS

## 2023-12-29 PROCEDURE — 73721 MRI JNT OF LWR EXTRE W/O DYE: CPT

## 2024-01-09 ENCOUNTER — OFFICE VISIT (OUTPATIENT)
Dept: OBGYN CLINIC | Facility: CLINIC | Age: 59
End: 2024-01-09
Payer: COMMERCIAL

## 2024-01-09 VITALS
WEIGHT: 177 LBS | HEART RATE: 69 BPM | DIASTOLIC BLOOD PRESSURE: 79 MMHG | BODY MASS INDEX: 26.14 KG/M2 | SYSTOLIC BLOOD PRESSURE: 139 MMHG

## 2024-01-09 DIAGNOSIS — S83.241A OTHER TEAR OF MEDIAL MENISCUS, CURRENT INJURY, RIGHT KNEE, INITIAL ENCOUNTER: Primary | ICD-10-CM

## 2024-01-09 PROCEDURE — 99213 OFFICE O/P EST LOW 20 MIN: CPT | Performed by: PHYSICAL MEDICINE & REHABILITATION

## 2024-01-09 NOTE — PROGRESS NOTES
1. Other tear of medial meniscus, current injury, right knee, initial encounter  Ambulatory referral to Orthopedic Surgery        Orders Placed This Encounter   Procedures    Ambulatory referral to Orthopedic Surgery      Impression:  Patient is here in follow up of right knee pain and effusion likely secondary to posterior medial meniscus tear.  Treatment has included physical therapy, anti-inflammatories and intra-articular steroid injection.  We reviewed Crescencio's MRI today.  Crescencio has great range of motion with minimal pain on meniscus testing.  However, he does continue to have symptoms throughout his activities of daily living that have been worsening.  Due to this, we will have him go for consultation with sports surgeon, Dr. Cam.     Imaging Studies (I personally reviewed images in PACS and report):  Right knee x-rays most recent to this encounter reviewed.  These images show slight medial joint space narrowing with sclerosis.  There is a small joint effusion.    Right knee MRI shows posterior meniscus tear with slight extrusion. Tiny cyst noted.    Return if symptoms worsen or fail to improve.    Patient is in agreement with the above plan.    HPI:  Sean Thomas is a 58 y.o. male  who presents in follow up.  Here for   Chief Complaint   Patient presents with    Right Knee - Pain, Follow-up     MRI Review       Since last visit: See above.    Following history reviewed and updated:  Past Medical History:   Diagnosis Date    Allergic     Asthma     Depression     Diverticulitis of colon     Dyspnea on exertion 01/20/2022    Hypertension     Pneumonia 03/2016    Precordial pain 01/18/2022     Past Surgical History:   Procedure Laterality Date    APPENDECTOMY      VASECTOMY      VasDeferens     Social History   Social History     Substance and Sexual Activity   Alcohol Use Yes    Alcohol/week: 7.0 standard drinks of alcohol    Types: 7 Glasses of wine per week    Comment: one glass wine per day     Social  History     Substance and Sexual Activity   Drug Use Never     Social History     Tobacco Use   Smoking Status Never   Smokeless Tobacco Never     Family History   Problem Relation Age of Onset    Anxiety disorder Mother     Hypertension Father     Hyperlipidemia Father     No Known Problems Sister     No Known Problems Sister     No Known Problems Son     No Known Problems Son     No Known Problems Daughter     Anxiety disorder Family     Heart disease Family     Stroke Family      Allergies   Allergen Reactions    Budesonide-Formoterol Fumarate      Category: Adverse Reaction; Annotation - [id number]: Had a effluvium llike response to symbicort. He does do well with oral prednisone, fluticasone NS and short acting beta agonist (ventolin)    Rosuvastatin Myalgia    Seasonal Ic [Cholestatin]         Constitutional:  /79   Pulse 69   Wt 80.3 kg (177 lb)   BMI 26.14 kg/m²    General: NAD.  Eyes: Clear sclerae.  ENT: No inflammation, lesion, or mass of lips.  No tracheal deviation.  Musculoskeletal: As mentioned below.  Integumentary: No visible rashes or skin lesions.  Pulmonary/Chest: Effort normal. No respiratory distress.   Neuro: CN's grossly intact, KATE.  Psych: Normal affect and judgement.  Vascular: WWP.    Right Knee Exam     Tenderness   The patient is experiencing tenderness in the medial joint line.    Range of Motion   Extension:  normal   Flexion:  normal     Tests   Yoan:  Medial - positive Lateral - negative  Varus: negative Valgus: negative    Other   Erythema: absent  Scars: absent  Sensation: normal  Pulse: present  Swelling: none  Effusion: no effusion present    Comments:  Minimal discomfort with bounce home.  Positive Thessaly.             Procedures

## 2024-01-24 ENCOUNTER — OFFICE VISIT (OUTPATIENT)
Dept: OBGYN CLINIC | Facility: CLINIC | Age: 59
End: 2024-01-24
Payer: COMMERCIAL

## 2024-01-24 VITALS
DIASTOLIC BLOOD PRESSURE: 92 MMHG | HEART RATE: 73 BPM | RESPIRATION RATE: 17 BRPM | WEIGHT: 177 LBS | BODY MASS INDEX: 26.22 KG/M2 | SYSTOLIC BLOOD PRESSURE: 152 MMHG | HEIGHT: 69 IN

## 2024-01-24 DIAGNOSIS — S83.241A OTHER TEAR OF MEDIAL MENISCUS, CURRENT INJURY, RIGHT KNEE, INITIAL ENCOUNTER: ICD-10-CM

## 2024-01-24 PROCEDURE — 99204 OFFICE O/P NEW MOD 45 MIN: CPT | Performed by: STUDENT IN AN ORGANIZED HEALTH CARE EDUCATION/TRAINING PROGRAM

## 2024-01-24 RX ORDER — CHLORHEXIDINE GLUCONATE ORAL RINSE 1.2 MG/ML
15 SOLUTION DENTAL ONCE
OUTPATIENT
Start: 2024-01-24 | End: 2024-01-24

## 2024-01-24 NOTE — PROGRESS NOTES
Ortho Sports Medicine Knee New Patient Visit     Assesment:   58 y.o. male right knee medial meniscus tear    Plan:  The patient's diagnosis and treatment were discussed at length today. We discussed no treatment, non-operative treatment, and operative treatment.    9 presents for evaluation of right knee medial meniscus tear after having failed conservative treatment consisting of corticosteroid injection, physical therapy, activity modification, and anti-inflammatories.  His ongoing pain as well as mechanical symptoms catching locking and is frustrated with his ongoing discomfort.  He and I agree through shared decision making that he would benefit from knee arthroscopy with partial medial meniscectomy versus repair.  Informed consent was obtained after discussing risk benefits and alternatives at length.  Discussed anticipated postoperative recovery timeline.  Patient was in agreement with this plan.    Given that the patient has failed conservative treatment and continues to have severe pain and/or dysfunction that limits their activities of daily living, they would like to proceed with operative intervention. We discussed with the patient the risks of no treatment, non-operative treatment, and operative treatment. The risks of operative intervention were discussed and include but are not limited to: Infection, bleeding, stiffness, loss of range of motion, blood clot, failure of surgery, fracture, delayed union, nonunion, malunion, risk of potential future arthritis, continued problems with swelling, injury to surrounding structures/nerve/artery/vein, recurrence of cysts, failure of hardware, retained hardware and/or foreign body, symptomatic hardware, and continued instability, pain, dysfunction, or disability despite repair.       Conservative treatment:    Ice to knee for 20 minutes at least 1-2 times daily.  OTC NSAIDS prn for pain.  Let pain guide gradual return activities.    Imaging:    All imaging from  today was reviewed by myself and explained to the patient.       Injection:    No Injection planned at this time.      Surgery:     All of the risks and benefits of operative treatment were explained to the patient, as well as the risks and benefits of any alternative treatment options, including nonoperative care. The risks of surgical treatement include, but are not limited to, infection, bleeding, blood clot, neurovascular damage, need for further surgery, continued pain, cardiovascular risk, and anesthesia risk.  The patient understood this and elects to proceed forward with surgical intervention.  We will proceed forward with Right knee arthroscopy with partial medial and/or lateral meniscectomy versus repair and all associated procedures.      Follow up:    Return for follow up 5-7 days post-op.        Chief Complaint   Patient presents with    Right Knee - Pain       History of Present Illness:    The patient is a 58 y.o. male referred to me by Dr. Maddie DO, seen in clinic for consultation of right knee pain.  He states he has been having ongoing right knee pain now for several months without any specific injury or trauma.  He states that his pain is dull and achy which he rates a 3-4 out of 10.  He states that his pain is predominantly on the medial aspect of his knee that is worse after periods of prolonged activity as well as going up and down steps and squatting.  He had previously saw Dr. Perkins who provided him with cortisone injection and 8+ weeks of outpatient physical therapy with no significant improvement of his symptoms.  He denies any instability.  He denies any mechanical symptoms or catching or locking.  He is currently manages pain with ice, over-the-counter medication, and topical Voltaren gel with mild relief of his symptoms.  He denies any previous history of injury or trauma to his knee.  He denies any numbness or tingling.    Knee Surgical History:  None    Past Medical, Social and  Family History:  Past Medical History:   Diagnosis Date    Allergic     Asthma     Depression     Diverticulitis of colon     Dyspnea on exertion 01/20/2022    Hypertension     Pneumonia 03/2016    Precordial pain 01/18/2022     Past Surgical History:   Procedure Laterality Date    APPENDECTOMY      VASECTOMY      VasDeferens     Allergies   Allergen Reactions    Budesonide-Formoterol Fumarate      Category: Adverse Reaction; Annotation - [id number]: Had a effluvium llike response to symbicort. He does do well with oral prednisone, fluticasone NS and short acting beta agonist (ventolin)    Rosuvastatin Myalgia    Seasonal Ic [Cholestatin]      Current Outpatient Medications on File Prior to Visit   Medication Sig Dispense Refill    diphenhydrAMINE (BENADRYL) 25 mg tablet Take by mouth        escitalopram (LEXAPRO) 5 mg tablet TAKE 1 TABLET BY MOUTH DAILY 90 tablet 1    ezetimibe (ZETIA) 10 mg tablet TAKE 1 TABLET BY MOUTH DAILY 90 tablet 1    levalbuterol (Xopenex HFA) 45 mcg/act inhaler Inhale 1-2 puffs every 4 (four) hours as needed for wheezing 45 g 3    meloxicam (MOBIC) 15 mg tablet Take 1 tablet (15 mg total) by mouth daily as needed for moderate pain 60 tablet 0    multivitamin (THERAGRAN) TABS Take 1 tablet by mouth daily      Triamcinolone Acetonide (Nasacort Allergy) 55 MCG/ACT nasal spray into each nostril      triamterene-hydrochlorothiazide (MAXZIDE-25) 37.5-25 mg per tablet TAKE 1 TABLET BY MOUTH DAILY 90 tablet 1    fluticasone-umeclidinium-vilanterol (Trelegy Ellipta) 200-62.5-25 mcg/actuation AEPB inhaler Inhale 1 puff daily Rinse mouth after use. 180 blister 3     No current facility-administered medications on file prior to visit.     Social History     Socioeconomic History    Marital status: /Civil Union     Spouse name: Not on file    Number of children: Not on file    Years of education: Not on file    Highest education level: Not on file   Occupational History    Not on file   Tobacco  "Use    Smoking status: Never    Smokeless tobacco: Never   Vaping Use    Vaping status: Never Used   Substance and Sexual Activity    Alcohol use: Yes     Alcohol/week: 7.0 standard drinks of alcohol     Types: 7 Glasses of wine per week     Comment: one glass wine per day    Drug use: Never    Sexual activity: Yes     Partners: Female     Birth control/protection: Male Sterilization   Other Topics Concern    Not on file   Social History Narrative    Caffeine Use     Social Determinants of Health     Financial Resource Strain: Not on file   Food Insecurity: Not on file   Transportation Needs: Not on file   Physical Activity: Not on file   Stress: Not on file   Social Connections: Not on file   Intimate Partner Violence: Not on file   Housing Stability: Not on file         I have reviewed the past medical, surgical, social and family history, medications and allergies as documented in the EMR.    Review of systems: ROS is negative other than that noted in the HPI.  Constitutional: Negative for fatigue and fever.   HENT: Negative for sore throat.    Respiratory: Negative for shortness of breath.    Cardiovascular: Negative for chest pain.   Gastrointestinal: Negative for abdominal pain.   Endocrine: Negative for cold intolerance and heat intolerance.   Genitourinary: Negative for flank pain.   Musculoskeletal: Negative for back pain.   Skin: Negative for rash.   Allergic/Immunologic: Negative for immunocompromised state.   Neurological: Negative for dizziness.   Psychiatric/Behavioral: Negative for agitation.      Physical Exam:    Blood pressure 152/92, pulse 73, resp. rate 17, height 5' 9\" (1.753 m), weight 80.3 kg (177 lb).    General/Constitutional: NAD, well developed, well nourished  HENT: Normocephalic, atraumatic  CV: Intact distal pulses, regular rate  Resp: No respiratory distress or labored breathing  Lymphatic: No lymphadenopathy palpated  Neuro: Alert and Oriented x 3, no focal deficits  Psych: Normal " mood, normal affect, normal judgement, normal behavior  Skin: Warm, dry, no rashes, no erythema      Knee Exam (focused):  Visual inspection of the Right knee demonstrates normal contour without atrophy.   No previous incisions   There is no significant erythema or edema.    No significant joint effusion   Range of motion is full from 0-130 degrees of flexion   Able to straight leg raise   No patellar tender to palpation  Mild medial joint line tenderness, No lateral joint line tenderness  + medial Yoan's, - lateral Yoan's  1A Lachman exam, Stable posterior drawer  - dial test  Stable to varus and valgus stress at both 0 and 30°  Patella tracks normally.  No J sign.  No apprehension.  Translation is approximately 2 quadrants and is equal to the contralateral side  Patellar eversion is similar to the contralateral side    Examination of the patient's ipsilateral hip demonstrates full painless range of motion.  No crepitus.      LE NV Exam: +2 DP/PT pulses bilaterally  Sensation intact to light touch L2-S1 bilaterally     Bilateral hip ROM demonstrates no pain actively or passively    No calf tenderness to palpation bilaterally    Knee Imaging    X-rays of the right knee were reviewed, which demonstrate mild degenerative changes with no acute osseous abnormalities.  I have reviewed the radiology report and agree with their impression.    MRI of the right knee were reviewed, which demonstrate complex undersurface tear posterior horn medial meniscus tear exhibiting mild extrusion without cord fragment.  There is horizontal cleavage component to the posterior root.  I have reviewed the radiology report and agree with their impression.      Scribe Attestation      I,:  Anthony Betts am acting as a scribe while in the presence of the attending physician.:       I,:  Morgan Cam, DO personally performed the services described in this documentation    as scribed in my presence.:

## 2024-01-28 ENCOUNTER — ANESTHESIA EVENT (OUTPATIENT)
Dept: ANESTHESIOLOGY | Facility: HOSPITAL | Age: 59
End: 2024-01-28

## 2024-01-28 ENCOUNTER — ANESTHESIA (OUTPATIENT)
Dept: ANESTHESIOLOGY | Facility: HOSPITAL | Age: 59
End: 2024-01-28

## 2024-02-01 RX ORDER — ACETAMINOPHEN 325 MG/1
650 TABLET ORAL EVERY 6 HOURS PRN
COMMUNITY

## 2024-02-01 NOTE — PRE-PROCEDURE INSTRUCTIONS
Pre-Surgery Instructions:   Medication Instructions    acetaminophen (TYLENOL) 325 mg tablet Uses PRN- OK to take day of surgery    diphenhydrAMINE (BENADRYL) 25 mg tablet Uses PRN- OK to take day of surgery    escitalopram (LEXAPRO) 5 mg tablet Take night before surgery    ezetimibe (ZETIA) 10 mg tablet Take day of surgery.    fluticasone-umeclidinium-vilanterol (Trelegy Ellipta) 200-62.5-25 mcg/actuation AEPB inhaler Take day of surgery.    levalbuterol (Xopenex HFA) 45 mcg/act inhaler Take day of surgery.    meloxicam (MOBIC) 15 mg tablet Stop taking 3 days prior to surgery.    multivitamin (THERAGRAN) TABS Stop taking 7 days prior to surgery.    Triamcinolone Acetonide (Nasacort Allergy) 55 MCG/ACT nasal spray Uses PRN- OK to take day of surgery    triamterene-hydrochlorothiazide (MAXZIDE-25) 37.5-25 mg per tablet Hold day of surgery.   Medication instructions for day surgery reviewed. Please use only a sip of water to take your instructed medications. Avoid all over the counter vitamins, supplements and NSAIDS for one week prior to surgery per anesthesia guidelines. Tylenol is ok to take as needed.     You will receive a call one business day prior to surgery with an arrival time and hospital directions. If your surgery is scheduled on a Monday, the hospital will be calling you on the Friday prior to your surgery. If you have not heard from anyone by 8pm, please call the hospital supervisor through the hospital  at [phone].    Do not eat or drink anything after midnight the night before your surgery, including candy, mints, lifesavers, or chewing gum. Do not drink alcohol 24hrs before your surgery. Try not to smoke at least 24hrs before your surgery.       Follow the pre surgery showering instructions as listed in the “My Surgical Experience Booklet” or otherwise provided by your surgeon's office. Do not use a blade to shave the surgical area 1 week before surgery. It is okay to use a clean  electric clippers up to 24 hours before surgery. Do not apply any lotions, creams, including makeup, cologne, deodorant, or perfumes after showering on the day of your surgery. Do not use dry shampoo, hair spray, hair gel, or any type of hair products.     No contact lenses, eye make-up, or artificial eyelashes. Remove nail polish, including gel polish, and any artificial, gel, or acrylic nails if possible. Remove all jewelry including rings and body piercing jewelry.     Wear causal clothing that is easy to take on and off. Consider your type of surgery.    Keep any valuables, jewelry, piercings at home. Please bring any specially ordered equipment (sling, braces) if indicated.    Arrange for a responsible person to drive you to and from the hospital on the day of your surgery. Visitor Guidelines discussed.     Call the surgeon's office with any new illnesses, exposures, or additional questions prior to surgery.    Please reference your “My Surgical Experience Booklet” for additional information to prepare for your upcoming surgery.

## 2024-02-07 DIAGNOSIS — E78.00 PURE HYPERCHOLESTEROLEMIA: ICD-10-CM

## 2024-02-08 RX ORDER — EZETIMIBE 10 MG/1
10 TABLET ORAL DAILY
Qty: 90 TABLET | Refills: 1 | Status: SHIPPED | OUTPATIENT
Start: 2024-02-08

## 2024-02-11 NOTE — H&P
Ortho Sports Medicine Knee New Patient Visit     Assesment:   58 y.o. male right knee medial meniscus tear     Plan:  The patient's diagnosis and treatment were discussed at length today. We discussed no treatment, non-operative treatment, and operative treatment.     9 presents for evaluation of right knee medial meniscus tear after having failed conservative treatment consisting of corticosteroid injection, physical therapy, activity modification, and anti-inflammatories.  His ongoing pain as well as mechanical symptoms catching locking and is frustrated with his ongoing discomfort.  He and I agree through shared decision making that he would benefit from knee arthroscopy with partial medial meniscectomy versus repair.  Informed consent was obtained after discussing risk benefits and alternatives at length.  Discussed anticipated postoperative recovery timeline.  Patient was in agreement with this plan.     Given that the patient has failed conservative treatment and continues to have severe pain and/or dysfunction that limits their activities of daily living, they would like to proceed with operative intervention. We discussed with the patient the risks of no treatment, non-operative treatment, and operative treatment. The risks of operative intervention were discussed and include but are not limited to: Infection, bleeding, stiffness, loss of range of motion, blood clot, failure of surgery, fracture, delayed union, nonunion, malunion, risk of potential future arthritis, continued problems with swelling, injury to surrounding structures/nerve/artery/vein, recurrence of cysts, failure of hardware, retained hardware and/or foreign body, symptomatic hardware, and continued instability, pain, dysfunction, or disability despite repair.         Conservative treatment:     Ice to knee for 20 minutes at least 1-2 times daily.  OTC NSAIDS prn for pain.  Let pain guide gradual return activities.     Imaging:     All  imaging from today was reviewed by myself and explained to the patient.         Injection:     No Injection planned at this time.       Surgery:     All of the risks and benefits of operative treatment were explained to the patient, as well as the risks and benefits of any alternative treatment options, including nonoperative care. The risks of surgical treatement include, but are not limited to, infection, bleeding, blood clot, neurovascular damage, need for further surgery, continued pain, cardiovascular risk, and anesthesia risk.  The patient understood this and elects to proceed forward with surgical intervention.  We will proceed forward with Right knee arthroscopy with partial medial and/or lateral meniscectomy versus repair and all associated procedures.        Follow up:     Return for follow up 5-7 days post-op.               Chief Complaint   Patient presents with    Right Knee - Pain         History of Present Illness:     The patient is a 58 y.o. male referred to me by Dr. Maddie DO, seen in clinic for consultation of right knee pain.  He states he has been having ongoing right knee pain now for several months without any specific injury or trauma.  He states that his pain is dull and achy which he rates a 3-4 out of 10.  He states that his pain is predominantly on the medial aspect of his knee that is worse after periods of prolonged activity as well as going up and down steps and squatting.  He had previously saw Dr. Perkins who provided him with cortisone injection and 8+ weeks of outpatient physical therapy with no significant improvement of his symptoms.  He denies any instability.  He denies any mechanical symptoms or catching or locking.  He is currently manages pain with ice, over-the-counter medication, and topical Voltaren gel with mild relief of his symptoms.  He denies any previous history of injury or trauma to his knee.  He denies any numbness or tingling.     Knee Surgical  History:  None     Past Medical, Social and Family History:  Medical History        Past Medical History:   Diagnosis Date    Allergic      Asthma      Depression      Diverticulitis of colon      Dyspnea on exertion 01/20/2022    Hypertension      Pneumonia 03/2016    Precordial pain 01/18/2022         Surgical History         Past Surgical History:   Procedure Laterality Date    APPENDECTOMY        VASECTOMY         VasDeferens               Allergies   Allergen Reactions    Budesonide-Formoterol Fumarate         Category: Adverse Reaction; Annotation - [id number]: Had a effluvium llike response to symbicort. He does do well with oral prednisone, fluticasone NS and short acting beta agonist (ventolin)    Rosuvastatin Myalgia    Seasonal Ic [Cholestatin]               Current Outpatient Medications on File Prior to Visit   Medication Sig Dispense Refill    diphenhydrAMINE (BENADRYL) 25 mg tablet Take by mouth          escitalopram (LEXAPRO) 5 mg tablet TAKE 1 TABLET BY MOUTH DAILY 90 tablet 1    ezetimibe (ZETIA) 10 mg tablet TAKE 1 TABLET BY MOUTH DAILY 90 tablet 1    levalbuterol (Xopenex HFA) 45 mcg/act inhaler Inhale 1-2 puffs every 4 (four) hours as needed for wheezing 45 g 3    meloxicam (MOBIC) 15 mg tablet Take 1 tablet (15 mg total) by mouth daily as needed for moderate pain 60 tablet 0    multivitamin (THERAGRAN) TABS Take 1 tablet by mouth daily        Triamcinolone Acetonide (Nasacort Allergy) 55 MCG/ACT nasal spray into each nostril        triamterene-hydrochlorothiazide (MAXZIDE-25) 37.5-25 mg per tablet TAKE 1 TABLET BY MOUTH DAILY 90 tablet 1    fluticasone-umeclidinium-vilanterol (Trelegy Ellipta) 200-62.5-25 mcg/actuation AEPB inhaler Inhale 1 puff daily Rinse mouth after use. 180 blister 3      No current facility-administered medications on file prior to visit.      Social History               Socioeconomic History    Marital status: /Civil Union       Spouse name: Not on file    Number  "of children: Not on file    Years of education: Not on file    Highest education level: Not on file   Occupational History    Not on file   Tobacco Use    Smoking status: Never    Smokeless tobacco: Never   Vaping Use    Vaping status: Never Used   Substance and Sexual Activity    Alcohol use: Yes       Alcohol/week: 7.0 standard drinks of alcohol       Types: 7 Glasses of wine per week       Comment: one glass wine per day    Drug use: Never    Sexual activity: Yes       Partners: Female       Birth control/protection: Male Sterilization   Other Topics Concern    Not on file   Social History Narrative     Caffeine Use      Social Determinants of Health      Financial Resource Strain: Not on file   Food Insecurity: Not on file   Transportation Needs: Not on file   Physical Activity: Not on file   Stress: Not on file   Social Connections: Not on file   Intimate Partner Violence: Not on file   Housing Stability: Not on file               I have reviewed the past medical, surgical, social and family history, medications and allergies as documented in the EMR.    Review of systems: ROS is negative other than that noted in the HPI.  Constitutional: Negative for fatigue and fever.   HENT: Negative for sore throat.    Respiratory: Negative for shortness of breath.    Cardiovascular: Negative for chest pain.   Gastrointestinal: Negative for abdominal pain.   Endocrine: Negative for cold intolerance and heat intolerance.   Genitourinary: Negative for flank pain.   Musculoskeletal: Negative for back pain.   Skin: Negative for rash.   Allergic/Immunologic: Negative for immunocompromised state.   Neurological: Negative for dizziness.   Psychiatric/Behavioral: Negative for agitation.       Physical Exam:     Blood pressure 152/92, pulse 73, resp. rate 17, height 5' 9\" (1.753 m), weight 80.3 kg (177 lb).     General/Constitutional: NAD, well developed, well nourished  HENT: Normocephalic, atraumatic  CV: Intact distal pulses, " regular rate  Resp: No respiratory distress or labored breathing  Lymphatic: No lymphadenopathy palpated  Neuro: Alert and Oriented x 3, no focal deficits  Psych: Normal mood, normal affect, normal judgement, normal behavior  Skin: Warm, dry, no rashes, no erythema        Knee Exam (focused):  Visual inspection of the Right knee demonstrates normal contour without atrophy.   No previous incisions   There is no significant erythema or edema.    No significant joint effusion   Range of motion is full from 0-130 degrees of flexion   Able to straight leg raise   No patellar tender to palpation  Mild medial joint line tenderness, No lateral joint line tenderness  + medial Yoan's, - lateral Yoan's  1A Lachman exam, Stable posterior drawer  - dial test  Stable to varus and valgus stress at both 0 and 30°  Patella tracks normally.  No J sign.  No apprehension.  Translation is approximately 2 quadrants and is equal to the contralateral side  Patellar eversion is similar to the contralateral side     Examination of the patient's ipsilateral hip demonstrates full painless range of motion.  No crepitus.      LE NV Exam: +2 DP/PT pulses bilaterally  Sensation intact to light touch L2-S1 bilaterally     Bilateral hip ROM demonstrates no pain actively or passively     No calf tenderness to palpation bilaterally     Knee Imaging     X-rays of the right knee were reviewed, which demonstrate mild degenerative changes with no acute osseous abnormalities.  I have reviewed the radiology report and agree with their impression.     MRI of the right knee were reviewed, which demonstrate complex undersurface tear posterior horn medial meniscus tear exhibiting mild extrusion without cord fragment.  There is horizontal cleavage component to the posterior root.  I have reviewed the radiology report and agree with their impression.        Scribe Attestation       I,:  Anthony Betts am acting as a scribe while in the presence of the  attending physician.:       I,:  Morgan Cam, DO personally performed the services described in this documentation    as scribed in my presence.:

## 2024-02-12 ENCOUNTER — ANESTHESIA EVENT (OUTPATIENT)
Dept: PERIOP | Facility: AMBULARY SURGERY CENTER | Age: 59
End: 2024-02-12
Payer: COMMERCIAL

## 2024-02-12 ENCOUNTER — ANESTHESIA (OUTPATIENT)
Dept: PERIOP | Facility: AMBULARY SURGERY CENTER | Age: 59
End: 2024-02-12
Payer: COMMERCIAL

## 2024-02-12 ENCOUNTER — HOSPITAL ENCOUNTER (OUTPATIENT)
Facility: AMBULARY SURGERY CENTER | Age: 59
Setting detail: OUTPATIENT SURGERY
Discharge: HOME | End: 2024-02-12
Attending: STUDENT IN AN ORGANIZED HEALTH CARE EDUCATION/TRAINING PROGRAM | Admitting: STUDENT IN AN ORGANIZED HEALTH CARE EDUCATION/TRAINING PROGRAM
Payer: COMMERCIAL

## 2024-02-12 VITALS
BODY MASS INDEX: 25.48 KG/M2 | TEMPERATURE: 97.5 F | OXYGEN SATURATION: 95 % | HEART RATE: 75 BPM | DIASTOLIC BLOOD PRESSURE: 79 MMHG | WEIGHT: 172 LBS | HEIGHT: 69 IN | SYSTOLIC BLOOD PRESSURE: 149 MMHG | RESPIRATION RATE: 18 BRPM

## 2024-02-12 DIAGNOSIS — M25.561 CHRONIC PAIN OF RIGHT KNEE: ICD-10-CM

## 2024-02-12 DIAGNOSIS — G89.29 CHRONIC PAIN OF RIGHT KNEE: ICD-10-CM

## 2024-02-12 DIAGNOSIS — S83.241A OTHER TEAR OF MEDIAL MENISCUS, CURRENT INJURY, RIGHT KNEE, INITIAL ENCOUNTER: Primary | ICD-10-CM

## 2024-02-12 PROCEDURE — 29881 ARTHRS KNE SRG MNISECTMY M/L: CPT

## 2024-02-12 PROCEDURE — 29881 ARTHRS KNE SRG MNISECTMY M/L: CPT | Performed by: STUDENT IN AN ORGANIZED HEALTH CARE EDUCATION/TRAINING PROGRAM

## 2024-02-12 PROCEDURE — NC001 PR NO CHARGE: Performed by: STUDENT IN AN ORGANIZED HEALTH CARE EDUCATION/TRAINING PROGRAM

## 2024-02-12 PROCEDURE — 29876 ARTHRS KNEE SURG SYNVCT MAJ: CPT | Performed by: STUDENT IN AN ORGANIZED HEALTH CARE EDUCATION/TRAINING PROGRAM

## 2024-02-12 PROCEDURE — 29876 ARTHRS KNEE SURG SYNVCT MAJ: CPT

## 2024-02-12 RX ORDER — ACETAMINOPHEN 325 MG/1
975 TABLET ORAL EVERY 8 HOURS
Status: DISCONTINUED | OUTPATIENT
Start: 2024-02-12 | End: 2024-02-12 | Stop reason: HOSPADM

## 2024-02-12 RX ORDER — MORPHINE SULFATE 4 MG/ML
INJECTION, SOLUTION INTRAMUSCULAR; INTRAVENOUS AS NEEDED
Status: DISCONTINUED | OUTPATIENT
Start: 2024-02-12 | End: 2024-02-12 | Stop reason: HOSPADM

## 2024-02-12 RX ORDER — ONDANSETRON 2 MG/ML
4 INJECTION INTRAMUSCULAR; INTRAVENOUS ONCE AS NEEDED
Status: DISCONTINUED | OUTPATIENT
Start: 2024-02-12 | End: 2024-02-12 | Stop reason: HOSPADM

## 2024-02-12 RX ORDER — SODIUM CHLORIDE, SODIUM LACTATE, POTASSIUM CHLORIDE, CALCIUM CHLORIDE 600; 310; 30; 20 MG/100ML; MG/100ML; MG/100ML; MG/100ML
INJECTION, SOLUTION INTRAVENOUS CONTINUOUS PRN
Status: DISCONTINUED | OUTPATIENT
Start: 2024-02-12 | End: 2024-02-12

## 2024-02-12 RX ORDER — ONDANSETRON 2 MG/ML
INJECTION INTRAMUSCULAR; INTRAVENOUS AS NEEDED
Status: DISCONTINUED | OUTPATIENT
Start: 2024-02-12 | End: 2024-02-12

## 2024-02-12 RX ORDER — CHLORHEXIDINE GLUCONATE ORAL RINSE 1.2 MG/ML
15 SOLUTION DENTAL ONCE
Status: DISCONTINUED | OUTPATIENT
Start: 2024-02-12 | End: 2024-02-12 | Stop reason: HOSPADM

## 2024-02-12 RX ORDER — HYDROMORPHONE HCL/PF 1 MG/ML
0.25 SYRINGE (ML) INJECTION
Status: DISCONTINUED | OUTPATIENT
Start: 2024-02-12 | End: 2024-02-12 | Stop reason: HOSPADM

## 2024-02-12 RX ORDER — MIDAZOLAM HYDROCHLORIDE 2 MG/2ML
INJECTION, SOLUTION INTRAMUSCULAR; INTRAVENOUS AS NEEDED
Status: DISCONTINUED | OUTPATIENT
Start: 2024-02-12 | End: 2024-02-12

## 2024-02-12 RX ORDER — LIDOCAINE HYDROCHLORIDE 10 MG/ML
INJECTION, SOLUTION EPIDURAL; INFILTRATION; INTRACAUDAL; PERINEURAL AS NEEDED
Status: DISCONTINUED | OUTPATIENT
Start: 2024-02-12 | End: 2024-02-12

## 2024-02-12 RX ORDER — OXYCODONE HYDROCHLORIDE 5 MG/1
5 TABLET ORAL EVERY 4 HOURS PRN
Qty: 15 TABLET | Refills: 0 | Status: SHIPPED | OUTPATIENT
Start: 2024-02-12

## 2024-02-12 RX ORDER — FENTANYL CITRATE 50 UG/ML
INJECTION, SOLUTION INTRAMUSCULAR; INTRAVENOUS AS NEEDED
Status: DISCONTINUED | OUTPATIENT
Start: 2024-02-12 | End: 2024-02-12

## 2024-02-12 RX ORDER — ASPIRIN 81 MG/1
81 TABLET, CHEWABLE ORAL 2 TIMES DAILY
Qty: 60 TABLET | Refills: 0 | Status: SHIPPED | OUTPATIENT
Start: 2024-02-12 | End: 2024-03-13

## 2024-02-12 RX ORDER — CEFAZOLIN SODIUM 2 G/50ML
2000 SOLUTION INTRAVENOUS ONCE
Status: COMPLETED | OUTPATIENT
Start: 2024-02-12 | End: 2024-02-12

## 2024-02-12 RX ORDER — PROPOFOL 10 MG/ML
INJECTION, EMULSION INTRAVENOUS CONTINUOUS PRN
Status: DISCONTINUED | OUTPATIENT
Start: 2024-02-12 | End: 2024-02-12

## 2024-02-12 RX ORDER — ONDANSETRON 2 MG/ML
4 INJECTION INTRAMUSCULAR; INTRAVENOUS EVERY 6 HOURS PRN
Status: DISCONTINUED | OUTPATIENT
Start: 2024-02-12 | End: 2024-02-12 | Stop reason: HOSPADM

## 2024-02-12 RX ORDER — PROPOFOL 10 MG/ML
INJECTION, EMULSION INTRAVENOUS AS NEEDED
Status: DISCONTINUED | OUTPATIENT
Start: 2024-02-12 | End: 2024-02-12

## 2024-02-12 RX ORDER — OXYCODONE HYDROCHLORIDE 5 MG/1
5 TABLET ORAL EVERY 4 HOURS PRN
Status: DISCONTINUED | OUTPATIENT
Start: 2024-02-12 | End: 2024-02-12 | Stop reason: HOSPADM

## 2024-02-12 RX ORDER — BUPIVACAINE HYDROCHLORIDE 2.5 MG/ML
INJECTION, SOLUTION EPIDURAL; INFILTRATION; INTRACAUDAL AS NEEDED
Status: DISCONTINUED | OUTPATIENT
Start: 2024-02-12 | End: 2024-02-12 | Stop reason: HOSPADM

## 2024-02-12 RX ORDER — OXYCODONE HYDROCHLORIDE 5 MG/1
10 TABLET ORAL EVERY 4 HOURS PRN
Status: DISCONTINUED | OUTPATIENT
Start: 2024-02-12 | End: 2024-02-12 | Stop reason: HOSPADM

## 2024-02-12 RX ORDER — SODIUM CHLORIDE, SODIUM LACTATE, POTASSIUM CHLORIDE, CALCIUM CHLORIDE 600; 310; 30; 20 MG/100ML; MG/100ML; MG/100ML; MG/100ML
50 INJECTION, SOLUTION INTRAVENOUS CONTINUOUS
Status: DISCONTINUED | OUTPATIENT
Start: 2024-02-12 | End: 2024-02-12 | Stop reason: HOSPADM

## 2024-02-12 RX ORDER — FENTANYL CITRATE/PF 50 MCG/ML
50 SYRINGE (ML) INJECTION
Status: DISCONTINUED | OUTPATIENT
Start: 2024-02-12 | End: 2024-02-12 | Stop reason: HOSPADM

## 2024-02-12 RX ORDER — DEXAMETHASONE SODIUM PHOSPHATE 10 MG/ML
INJECTION, SOLUTION INTRAMUSCULAR; INTRAVENOUS AS NEEDED
Status: DISCONTINUED | OUTPATIENT
Start: 2024-02-12 | End: 2024-02-12

## 2024-02-12 RX ADMIN — FENTANYL CITRATE 50 MCG: 50 INJECTION INTRAMUSCULAR; INTRAVENOUS at 08:27

## 2024-02-12 RX ADMIN — LIDOCAINE HYDROCHLORIDE 50 MG: 10 INJECTION, SOLUTION EPIDURAL; INFILTRATION; INTRACAUDAL; PERINEURAL at 07:31

## 2024-02-12 RX ADMIN — ONDANSETRON 4 MG: 2 INJECTION INTRAMUSCULAR; INTRAVENOUS at 07:36

## 2024-02-12 RX ADMIN — PHENYLEPHRINE HYDROCHLORIDE 40 MCG/MIN: 10 INJECTION INTRAVENOUS at 07:31

## 2024-02-12 RX ADMIN — OXYCODONE HYDROCHLORIDE 10 MG: 5 TABLET ORAL at 08:57

## 2024-02-12 RX ADMIN — MIDAZOLAM 2 MG: 1 INJECTION INTRAMUSCULAR; INTRAVENOUS at 07:23

## 2024-02-12 RX ADMIN — SODIUM CHLORIDE, SODIUM LACTATE, POTASSIUM CHLORIDE, AND CALCIUM CHLORIDE: .6; .31; .03; .02 INJECTION, SOLUTION INTRAVENOUS at 07:18

## 2024-02-12 RX ADMIN — CEFAZOLIN SODIUM 2000 MG: 2 SOLUTION INTRAVENOUS at 07:29

## 2024-02-12 RX ADMIN — PROPOFOL 150 MG: 10 INJECTION, EMULSION INTRAVENOUS at 07:31

## 2024-02-12 RX ADMIN — FENTANYL CITRATE 25 MCG: 50 INJECTION INTRAMUSCULAR; INTRAVENOUS at 07:39

## 2024-02-12 RX ADMIN — PROPOFOL 60 MCG/KG/MIN: 10 INJECTION, EMULSION INTRAVENOUS at 07:31

## 2024-02-12 RX ADMIN — DEXAMETHASONE SODIUM PHOSPHATE 10 MG: 10 INJECTION, SOLUTION INTRAMUSCULAR; INTRAVENOUS at 07:36

## 2024-02-12 NOTE — ANESTHESIA PREPROCEDURE EVALUATION
Procedure:  KNEE ARTHROSCOPIC PARTIAL MENISCECTOMY LATERAL OR MEDIAL (Right: Knee)    Relevant Problems   CARDIO   (+) Essential hypertension   (+) Pure hypercholesterolemia      NEURO/PSYCH   (+) GABBY (generalized anxiety disorder)      PULMONARY   (+) Moderate persistent asthma without complication   (+) ALEX (obstructive sleep apnea)        Physical Exam    Airway    Mallampati score: II  TM Distance: >3 FB  Neck ROM: full     Dental   No notable dental hx     Cardiovascular  Rhythm: regular, Rate: normal    Pulmonary   Breath sounds clear to auscultation    Other Findings        Anesthesia Plan  ASA Score- 2     Anesthesia Type- general with ASA Monitors.         Additional Monitors:     Airway Plan: LMA.           Plan Factors-Exercise tolerance (METS): >4 METS.    Chart reviewed. EKG reviewed.  Existing labs reviewed. Patient summary reviewed.    Patient is not a current smoker.              Induction- intravenous.    Postoperative Plan- Plan for postoperative opioid use.     Informed Consent- Anesthetic plan and risks discussed with patient.  I personally reviewed this patient with the CRNA. Discussed and agreed on the Anesthesia Plan with the CRNA..

## 2024-02-12 NOTE — ANESTHESIA POSTPROCEDURE EVALUATION
Post-Op Assessment Note    CV Status:  Stable    Pain management: adequate       Mental Status:  Sleepy and lethargic   Hydration Status:  Stable   PONV Controlled:  None   Airway Patency:  Patent     Post Op Vitals Reviewed: Yes    No anethesia notable event occurred.    Staff: CRNA               BP   126/65   Temp      Pulse  66   Resp      SpO2   98

## 2024-02-12 NOTE — INTERVAL H&P NOTE
H&P reviewed. After examining the patient I find no changes in the patients condition since the H&P had been written.    Vitals:    02/12/24 0642   BP: 157/86   Pulse: 70   Resp: 18   Temp: 97.7 °F (36.5 °C)   SpO2: 98%

## 2024-02-12 NOTE — DISCHARGE INSTR - AVS FIRST PAGE
DR. AVILES KNEE ARTHROSCOPY     POST OPERATIVE INSTRUCTIONS          EXPECTATIONS     It is normal to have some discomfort in your knee for several days after surgery. For the next 48 to 72 hours use ice bags or gel packs around the knee to help reduce the pain and swelling. Place a pillow underneath your heel or calf to help reduce pressure and discomfort.?Do not place any pillows under your knee as this can cause stiffness.      WEIGHT BEARING AND WOUND CARE     Weight bear as tolerated on your operative leg. You may need crutches for 1-2 days for standing and walking.?Do not drive until instructed by your physician.     The bandage over your knee may be removed 3 days?after surgery and then you may shower. Please leave any steri-strips in place.?After showering, cover your incisions with band aids.? Replace the band-aids every time you shower. No bathing, swimming, or submerging until discussed at your follow-up appointment.?     MEDICATIONS     You have been prescribed several medications. Take as directed on the instructions. If you experience any adverse effects, stop taking them immediately and call the office for additional instructions.      Tylenol: 1000 mg every 8 hours for mild/moderate pain     Ibuprofen: 600mg every 8 hours for mild/moderate pain. You can take this together with the Tylenol, they do not interact.      Aspirin 81 mg twice daily for 30 days. This is to prevent blood clots after surgery.      Narcotic pain medication: You may have been prescribed a strong narcotic medication. Take this according to the pharmacy instructions.      FOLLOW UP     The findings of your surgery will be explained to you and your family immediately after surgery. However, in the post-operative period, during recovery from anesthesia you may not fully remember or fully understand what was said. This will be explained once again when you return for your post-op appointment.      You should call to schedule a  post-operative appointment in the office 10-14 days from the date of surgery.      If you experience fever over 101 degrees, excessive bleeding, persistent nausea or vomiting, decreased sensation or discoloration of the operative arm, or severe uncontrollable pain please contact Dr. Cam’s office immediately.

## 2024-02-12 NOTE — OP NOTE
OPERATIVE REPORT  PATIENT NAME: Sean Thomas    :  1965  MRN: [id number]  Pt Location: [facility] OR ROOM 06    SURGERY DATE: 2024    Surgeons and Role:     * Morgan Cam, DO - Primary     *Tena Gross [facility]      Preop Diagnosis:  Other tear of medial meniscus, current injury, right knee, initial encounter [S83.241A]    Post-Op Diagnosis Codes:     * Other tear of medial meniscus, current injury, right knee, initial encounter [S83.241A]    Procedure(s):  Right - KNEE ARTHROSCOPIC PARTIAL MENISCECTOMY MEDIAL  Right - KNEE ARTHROSCOPIC CHONDROPLASTY PATELLA  Right - KNEE ARTHROSCOPIC SYNOVECTOMY, MAJOR    Specimen(s):  * No specimens in log *    Estimated Blood Loss:   Minimal    Drains:  * No LDAs found *    Anesthesia Type:   General/LMA    Operative Indications:  Other tear of medial meniscus, current injury, right knee, initial encounter [S83.241A]  The patient is a very active 58 year-old  who comes in to me with significant problems associated with right knee. After failure of conservative nonoperative care, eventually came and saw me, on physical examination with x-ray and radiographic findings, found to have a medial meniscus tear, OCD patella, synovitis.  The risks and benefits of surgical intervention were explained including, but not exclusive to, infection, DVT, loss of range of motion, stiffness, continuance of pain, failure, fracture, dislocation, delayed union, malunion, nonunion, wound complications, and neurovascular compromise.      Operative Findings:  Medial meniscus tear  OCD patella  Synovitis, major    Complications:   None    Procedure and Technique:  The patient was seen and examined in the pre-operative holding area. The patient's identity was confirmed against the hospital wristband and chart. The operative extremity was marked and signed against the consent, imaging, and patient confirmation. The patient was brought in the operating room, placed supine on the operating  table.  Once on the operating table, underwent general endotracheal anesthesia.  Once adequately anesthetized, found to have a stable ligamentous knee exam. All bony prominences were padded. The leg was prepped and draped in sterile fashion. A formal timeout was performed, matching the operative extremity to the consent, imaging, and site marking.     An inferior lateral patellar portal was created and examination of the  intraarticular portion of the joint revealed marked grade 3 changes of the medial facet of the patella, grade 2 changes of the trochlear groove.    The patient was noted to have significant synovitis in the suprapatellar pouch area with multiple small articular fragments.     A medial incision was made using a spinal needle.    In the medial compartment of the knee, the patient was noted to have a complex tear of the posterior horn and midbody of the medial meniscus. With the use of straight and up biting instrumentation, as well as an oscillating shaver, the meniscus was debrided back to a stable rim. The medial femoral condyle was noted to have grade 3 changes. the medial tibial plateau was found to have grade 2 changes. A medial compartment synovectomy was performed. The medial femoral condyle was debrided to remove loose/unstable tissue.    In the intracondylar notch of the ACL was found to be intact.  The PCL was found to be intact.  A synovectomy was performed.    No loose bodies were located in the intracondylar notch.      In the lateral compartment of the knee, the patient was noted to have intact lateral meniscus. This was probed and stable. The lateral femoral condyle was noted to have grade 2 changes.  The lateral tibial plateau was found to have grade 2 changes. A lateral compartment synovectomy was performed.    The patient was noted to have significant synovitis in the medial and lateral gutters.  With the use of  an oscillating shaver we did an extensive synovectomy of both the  medial and lateral gutters removing all small intra-articular fragments.     We then turned our attention to the patella. With an oscillating shaver,we stabilized the osteochondral injury to the patella. We stabilized the OCD lesion in a chondroplasty fashion using a shaver. We then performed an extensive suprapatella synovectomy.     The knee was then copiously irrigated and drained.  The wounds were closed with 3-0 nylon interrupted sutures.      A sterile dressing was placed on the knee.  The knee was kept out in extension and was found to have good capillary refill and pulses.  The patient's neurovascular was intact.  The patient was awoken in the operating room, transferred to a stretcher in the operating room and brought to recovery room in stable condition.     I was present for the entire procedure., A qualified resident physician was not available., and A physician assistant was required during the procedure for retraction, tissue handling, dissection and suturing.    Patient Disposition:  PACU         SIGNATURE: Morgan Cam DO  DATE: February 12, 2024  TIME: 7:21 AM

## 2024-02-16 ENCOUNTER — EVALUATION (OUTPATIENT)
Dept: PHYSICAL THERAPY | Age: 59
End: 2024-02-16
Payer: COMMERCIAL

## 2024-02-16 DIAGNOSIS — M25.561 CHRONIC PAIN OF RIGHT KNEE: Primary | ICD-10-CM

## 2024-02-16 DIAGNOSIS — M23.303 DERANGEMENT OF MEDIAL MENISCUS OF RIGHT KNEE: ICD-10-CM

## 2024-02-16 DIAGNOSIS — G89.29 CHRONIC PAIN OF RIGHT KNEE: Primary | ICD-10-CM

## 2024-02-16 PROCEDURE — 97110 THERAPEUTIC EXERCISES: CPT | Performed by: PHYSICAL THERAPIST

## 2024-02-16 PROCEDURE — 97161 PT EVAL LOW COMPLEX 20 MIN: CPT | Performed by: PHYSICAL THERAPIST

## 2024-02-16 NOTE — PROGRESS NOTES
PT Evaluation     Today's date: 2024  Patient name: Sean Thomas  : 1965  MRN: [id number]  Referring provider: PATTIE Strong  Dx:   Encounter Diagnosis     ICD-10-CM    1. Chronic pain of right knee M25.561    2.      Derangement of medial meniscus of right knee    M23.303            Assessment  Assessment details: The patient is a 58 y/o male who presents to PT with diagnosis of R knee medial meniscus injury.  Patient underwent R knee menisectomy on 24 performed by Dr. Morgan Cam. Patient doing well but has pain, decreased ROM and strength. Patient would benefit from Physical therapy to improve functional capacity.     Impairments: abnormal gait, abnormal or restricted ROM, activity intolerance, impaired balance, impaired physical strength, lacks appropriate home exercise program, pain with function and weight-bearing intolerance  Other impairment: decreased flexibility  Functional limitations: pain with stair negotiationUnderstanding of Dx/Px/POC: good   Prognosis: good    Goals  STGs:  1.  Initiate and complete HEP with verbal cues.  2.  Decrease R knee pain by > 25% in 4 weeks.  3.  Improve RLE strength by 1/2 grade in 4 weeks.  LTGs:  1.  Patient to be I with HEP in 8 weeks.  2. Improve R knee ROM to 0-130 degrees with decreased tightness at end range in 8 weeks to improve function.    3. Improve R LE strength to 5/5 t/o in 8 weeks to improve function.  4. Decrease R knee pain to < or = to 2-3/10 with activity in 8 weeks to improve function.  5.  Stair negotiation is improved to PLOF in 8 weeks.  6.  Recreational performance is improved to PLOF in 8 weeks.  7.  ADL performance is improved to PLOF in 8 weeks.  8.  Work performance is improved to maximal level of function in 8 weeks.         Plan  Plan details: Modalities and therapy interventions prn.    Patient would benefit from: skilled physical therapy  Planned modality interventions: cryotherapy and thermotherapy:  "hydrocollator packs  Planned therapy interventions: IASTM, manual therapy, balance, balance/weight bearing training, neuromuscular re-education, patient education, postural training, self care, flexibility, functional ROM exercises, gait training, strengthening, stretching, therapeutic activities, therapeutic exercise, home exercise program and joint mobilization  Frequency: 2x week  Duration in weeks: 8        Subjective Evaluation         Patient Goals  Patient goals for therapy: increased motion, decreased pain and increased strength  Patient goal: \"To have less pain, use the brace less, use the Voltaren less.\"  Pain  At best pain ratin  At worst pain ratin  Location: R Knee - medial knee  Quality: dull ache and throbbing  Alleviating factors: Using brace, Voltaren cream.  Aggravating factors: walking (Kneeling, driving, sitting at his desk)    Social Support  Steps to enter house: yes  Stairs in house: yes (First floor setup)   Lives in: multiple-level home  Lives with: spouse    Employment status: working (Full time engineering - sitting or standing)    Diagnostic Tests  Abnormal x-ray: See above.  Treatments  Previous treatment: injection treatment        Objective     Tenderness     Right Knee   Tenderness in the medial joint line. No tenderness in the lateral joint line.     Neurological Testing     Sensation     Knee   Left Knee   Intact: Light touch    Right Knee   Intact: light touch     Active Range of Motion   Left Knee   Flexion: 130 degrees   Extension: 0 degrees     Right Knee   Flexion: 120 (Tight at end range) degrees   Extension: 0 degrees     Additional Active Range of Motion Details  L SLR: 60  R SLR: 60    Mobility   Patellar Mobility:     Right Knee   WFL: medial and lateral    Strength/Myotome Testing     Left Knee   Flexion: 5  Extension: 5  Quadriceps contraction: good    Right Knee   Flexion: 4-  Extension: 4-  Quadriceps contraction: good    Tests       No signs of infection " around surgical incisions       Ambulation   Weight-Bearing Status     Additional Weight-Bearing Status Details  Using neoprene knee brace during the day.      Ambulation: Level Surfaces   Ambulation without assistive device: independent    Ambulation: Stairs   Ascend stairs: independent  Pattern: reciprocal  Descend stairs: independent  Pattern: reciprocal    Additional Stairs Ambulation Details  Typically with reciprocal gait pattern but with increased pain will go up and down with non-reciprocal gait pattern.      Observational Gait   Gait: within functional limits         Visit/Unit Tracking  AUTH Status:  Date 11/28              50 Visit Limit Used 1               Remaining  49                    Precautions: BOOTH, HTN, Depression         Manuals 11/28       R Knee PROM                                Neuro Re-Ed         BOSU Lunges        SLS        Tandem Stance        Sidestepping        Mini squats HEP                       Ther Ex        NuStep vs Bike        SLR x 3        TKE into ball        LAQ        QSets HEP       Heel slides HEP       SLR        Bridges        S/L Hip Abd        S/L Clamshells        Hams Stretch                        Ther Activity        Stepups F/L        Stepdowns        STS        Gait Training                        Modalities        CP prn

## 2024-02-20 ENCOUNTER — OFFICE VISIT (OUTPATIENT)
Dept: PHYSICAL THERAPY | Age: 59
End: 2024-02-20
Payer: COMMERCIAL

## 2024-02-20 DIAGNOSIS — M25.561 CHRONIC PAIN OF RIGHT KNEE: Primary | ICD-10-CM

## 2024-02-20 DIAGNOSIS — G89.29 CHRONIC PAIN OF RIGHT KNEE: Primary | ICD-10-CM

## 2024-02-20 DIAGNOSIS — M23.303 DERANGEMENT OF MEDIAL MENISCUS OF RIGHT KNEE: ICD-10-CM

## 2024-02-20 PROCEDURE — 97110 THERAPEUTIC EXERCISES: CPT | Performed by: PHYSICAL THERAPIST

## 2024-02-20 PROCEDURE — 97140 MANUAL THERAPY 1/> REGIONS: CPT | Performed by: PHYSICAL THERAPIST

## 2024-02-20 NOTE — PROGRESS NOTES
"Daily Note     Today's date: 2024  Patient name: Sean Thomas  : 1965  MRN: [id number]  Referring provider: Tena Ayala PA-C  Dx:   Encounter Diagnosis     ICD-10-CM    1. Chronic pain of right knee  M25.561     G89.29       2. Derangement of medial meniscus of right knee  M23.303           Start Time: 1615  Stop Time: 1700  Total time in clinic (min): 45 minutes    Subjective: Pt reports no new symptoms. Little to no pain.       Objective: See treatment diary below      Assessment: Tolerated treatment well. Patient is doing very well overall. Full ROM. Did have pain following a lot of walking at work the other day. Educated on tissue healing timeline.  Patient demonstrated fatigue post treatment and would benefit from continued PT      Plan: Continue per plan of care.      POC expires Unit limit Auth Expiration date PT/OT/ST + Visit Limit?   24 N/A N/A 50                                 Visit/Unit Tracking  AUTH Status:  Date              50 Visit Limit Used 1 2              Remaining  49 48                   Precautions: BOOTH, HTN, Depression  [employer] Access Code: [email or web address]       Manuals         R Knee PROM JF        R Hams, Calf         AP knee JF        Knee distraction          Neuro Re-Ed          BOSU Lunges         SLS         Tandem Stance         Sidestepping         Leg press BLE and SL                           Ther Ex         NuStep vs Bike 10'                 TKE into ball 2x10x5\"        lunges                  SAQ         SLR         Bridges 3x10        S/L Hip Abd         S/L Clamshells         Standing calf stretch         Stair lunge  20x5\"                 Ther Activity         Stepups F/L       6\" 2x10   Stepdowns         STS         Gait Training                           Modalities         CP prn                                            "

## 2024-02-21 ENCOUNTER — OFFICE VISIT (OUTPATIENT)
Dept: OBGYN CLINIC | Facility: CLINIC | Age: 59
End: 2024-02-21

## 2024-02-21 VITALS
RESPIRATION RATE: 17 BRPM | WEIGHT: 172 LBS | BODY MASS INDEX: 25.48 KG/M2 | HEART RATE: 69 BPM | HEIGHT: 69 IN | DIASTOLIC BLOOD PRESSURE: 83 MMHG | SYSTOLIC BLOOD PRESSURE: 155 MMHG

## 2024-02-21 DIAGNOSIS — S83.241A OTHER TEAR OF MEDIAL MENISCUS, CURRENT INJURY, RIGHT KNEE, INITIAL ENCOUNTER: Primary | ICD-10-CM

## 2024-02-21 PROCEDURE — 99024 POSTOP FOLLOW-UP VISIT: CPT | Performed by: STUDENT IN AN ORGANIZED HEALTH CARE EDUCATION/TRAINING PROGRAM

## 2024-02-21 NOTE — PROGRESS NOTES
Knee Post Operative Visit     Assesment:     58 y.o. male 9 days s/p surgical arthroscopy of the right knee with partial medial meniscectomy and chondroplasty patella, DOS: 2/12/2024    Plan:  The patient's diagnosis and treatment were discussed at length today. We discussed no treatment, non-operative treatment, and operative treatment.    Crescencio presents today for his first postop visit 9 days status post right knee arthroscopy with partial medial meniscectomy and chondroplasty patella performed on 2/12/2024.  He is doing extremely well at this time.  I discussed with him that he can continue with outpatient physical therapy according to knee arthroscopy protocol.  He can gradually return to activities using pain as a guide.  I did caution him on deep squatting greater than 90 degrees as well as repetitive twisting motion and standing for greater than 2 hours at a time without a 15-minute break.  He should continue with his 81 mg aspirin by daily as DVT prophylaxis.  He can continue use ice and over-the-counter medication as needed for pain relief.  He is to follow-up in approximately 5 weeks for reevaluation.    Post-Operative treatment:    Ice to knee for 20 minutes at least 1-2 times daily.  OTC NSAIDS prn for pain.  Continue outpatient physical therapy according to knee arthroscopy protocol.  Activity as tolerated using pain as a guide.    Imaging:    All imaging from today was reviewed by myself and explained to the patient.   Intraoperative images from right knee arthroscopy with partial medial meniscectomy chondroplasty patella performed on 2/12/2024 was reviewed at today's visit.    Weight bearing:  as tolerated     ROM:  Full    Brace:  No brace needed    DVT Prophylaxis:  Aspirin 81 mg oral twice daily x 3 weeks    Follow up:   5 weeks    Patient was advised that if they have any fevers, chills, chest pain, shortness of breath, redness or drainage from the incision, please let our office know immediately.   "        Chief Complaint   Patient presents with    Right Knee - Post-op     Sx: 02/12/24       History of Present Illness:    The patient is a 58 y.o. male who is being evaluated post operatively 1 week  status post right knee arthroscopy with partial medial meniscectomy and chondroplasty of patella performed on 2/12/2024.  He states that he is overall doing well at this time.  He does report dull achy pain diffusely throughout his knee, however he attributes this to a different type of discomfort than before surgery.  He stated that he has returned to work and has been doing a lot of walking as well as begun outpatient physical therapy.  He denies any new injury or trauma to his knee.  He denies any numbness or tingling.  He denies any drainage or leakage from incision.  He denies any fever or chills.       I have reviewed the past medical, surgical, social and family history, medications and allergies as documented in the EMR.    Review of systems: ROS is negative other than that noted in the HPI.  Constitutional: Negative for fatigue and fever.        Physical Exam:    Blood pressure 155/83, pulse 69, resp. rate 17, height 5' 9\" (1.753 m), weight 78 kg (172 lb).    General/Constitutional: NAD, well developed, well nourished  HENT: Normocephalic, atraumatic  CV: Intact distal pulses, regular rate  Resp: No respiratory distress or labored breathing  Lymphatic: No lymphadenopathy palpated  Neuro: Alert and Oriented x 3, no focal deficits  Psych: Normal mood, normal affect, normal judgement, normal behavior  Skin: Warm, dry, no rashes, no erythema      Knee Exam (focused):    Visual inspection of the right knee demonstrates normal contour without muscle atrophy.  Incisions appear clean and dry with no drainage or erythema.  Compartments soft and compressible.  Capillary refill is brisk.  Motor and sensory function intact.  Mild joint effusion.  Range of motion is full 0 to 130 degrees of knee flexion.  Mild " chandana-incisional tenderness to palpation.  No joint line tenderness.  Yoan's not tested.  Cruciates and collaterals not tested.  Able to straight leg raise without a lag.    Examination of the patient's ipsilateral hip demonstrates full painless range of motion.  No crepitus.     LE NV Exam: +2 DP/PT pulses bilaterally  Sensation intact to light touch L2-S1 bilaterally     Bilateral hip ROM demonstrates no pain actively or passively    No calf tenderness to palpation bilaterally    Scribe Attestation      I,:  Anthony Betts am acting as a scribe while in the presence of the attending physician.:       I,:  Tena Ayala PA-C personally performed the services described in this documentation    as scribed in my presence.:

## 2024-02-23 ENCOUNTER — OFFICE VISIT (OUTPATIENT)
Dept: PHYSICAL THERAPY | Age: 59
End: 2024-02-23
Payer: COMMERCIAL

## 2024-02-23 DIAGNOSIS — M25.561 CHRONIC PAIN OF RIGHT KNEE: Primary | ICD-10-CM

## 2024-02-23 DIAGNOSIS — M23.303 DERANGEMENT OF MEDIAL MENISCUS OF RIGHT KNEE: ICD-10-CM

## 2024-02-23 DIAGNOSIS — G89.29 CHRONIC PAIN OF RIGHT KNEE: Primary | ICD-10-CM

## 2024-02-23 PROCEDURE — 97110 THERAPEUTIC EXERCISES: CPT

## 2024-02-23 PROCEDURE — 97140 MANUAL THERAPY 1/> REGIONS: CPT

## 2024-02-23 NOTE — PROGRESS NOTES
"Daily Note     Today's date: 2024  Patient name: Sean Thomas  : 1965  MRN: [id number]  Referring provider: Tena Ayala PA-C  Dx:   Encounter Diagnosis     ICD-10-CM    1. Chronic pain of right knee  M25.561     G89.29       2. Derangement of medial meniscus of right knee  M23.303                      Subjective: Pt reports that he is doing pretty good today noting that this is the first time where he is having no real pain in his R knee.       Objective: See treatment diary below      Assessment: Tolerated treatment well. Pt continues to make good progress this session towards his long term goals. Added in some LE strengthening mostly with closed chained exercises.  Patient demonstrated fatigue post treatment and would benefit from continued PT      Plan: Continue per plan of care.      POC expires Unit limit Auth Expiration date PT/OT/ST + Visit Limit?   24 N/A N/A 50                                 Visit/Unit Tracking  AUTH Status:  Date             50 Visit Limit Used 1 2 3             Remaining  49 48 47                  Precautions: BOOTH, HTN, Depression  [employer] Access Code: [email or web address]       Manuals        R Knee PROM JF MM       R Hams, Calf         AP knee JF        Knee distraction          Neuro Re-Ed          BOSU Lunges         SLS         Tandem Stance         Sidestepping         Leg press BLE and SL  B/L 75# 2x10                         Ther Ex         NuStep vs Bike 10' 10' Bike                TKE into ball 2x10x5\" 2x10 5\"       lunges                  SAQ         SLR  2x10        Bridges 3x10        S/L Hip Abd  2x10       S/L Clamshells         Standing calf stretch         Stair lunge  20x5\" 20x5\"                Ther Activity         Stepups F/L       6\" 2x10   Stepdowns         STS         Gait Training                           Modalities         CP prn                                            "

## 2024-02-27 ENCOUNTER — OFFICE VISIT (OUTPATIENT)
Dept: PHYSICAL THERAPY | Age: 59
End: 2024-02-27
Payer: COMMERCIAL

## 2024-02-27 DIAGNOSIS — M23.303 DERANGEMENT OF MEDIAL MENISCUS OF RIGHT KNEE: ICD-10-CM

## 2024-02-27 DIAGNOSIS — G89.29 CHRONIC PAIN OF RIGHT KNEE: Primary | ICD-10-CM

## 2024-02-27 DIAGNOSIS — M25.561 CHRONIC PAIN OF RIGHT KNEE: Primary | ICD-10-CM

## 2024-02-27 PROCEDURE — 97140 MANUAL THERAPY 1/> REGIONS: CPT

## 2024-02-27 PROCEDURE — 97110 THERAPEUTIC EXERCISES: CPT

## 2024-02-27 NOTE — PROGRESS NOTES
"Daily Note     Today's date: 2024  Patient name: Sean Thomas  : 1965  MRN: [id number]  Referring provider: Tena Ayala PA-C  Dx:   Encounter Diagnosis     ICD-10-CM    1. Chronic pain of right knee  M25.561     G89.29       2. Derangement of medial meniscus of right knee  M23.303                      Subjective: Pt reports that he continues to feel good with no complaints of pain in his R knee. Notes minimal soreness following last session.       Objective: See treatment diary below      Assessment: Tolerated treatment well. Pt continues to make good progress this session towards his long term goals. Added in some LE strengthening mostly with closed chained exercises progressions made as charted below.  Patient demonstrated fatigue post treatment and would benefit from continued PT      Plan: Continue per plan of care.      POC expires Unit limit Auth Expiration date PT/OT/ST + Visit Limit?   24 N/A N/A 50                                 Visit/Unit Tracking  AUTH Status:  Date            50 Visit Limit Used 1 2 3 4            Remaining  49 48 47 46                 Precautions: BOOTH, HTN, Depression  [employer] Access Code: [id number]       Manuals       R Knee PROM JF MM MM      R Hams, Calf         AP knee JF        Knee distraction          Neuro Re-Ed          BOSU Lunges         SLS         Tandem Stance         Sidestepping         Leg press BLE and SL  B/L 75# 2x10 B/L 75# 3x10                        Ther Ex         NuStep vs Bike 10' 10' Bike 10' Bike               TKE into ball 2x10x5\" 2x10 5\" 2x10 5\"      lunges                  SAQ         SLR  2x10  2# 3x10      Bridges 3x10  2x10      S/L Hip Abd  2x10 2# 3x10      S/L Clamshells         Standing calf stretch         Stair lunge  20x5\" 20x5\" 20x5\"               Ther Activity         Stepups F/L       6\" 2x10   Stepdowns         STS         Gait Training                           Modalities       "   CP prn

## 2024-02-29 ENCOUNTER — OFFICE VISIT (OUTPATIENT)
Dept: PHYSICAL THERAPY | Age: 59
End: 2024-02-29
Payer: COMMERCIAL

## 2024-02-29 DIAGNOSIS — M25.561 CHRONIC PAIN OF RIGHT KNEE: Primary | ICD-10-CM

## 2024-02-29 DIAGNOSIS — G89.29 CHRONIC PAIN OF RIGHT KNEE: Primary | ICD-10-CM

## 2024-02-29 DIAGNOSIS — M23.303 DERANGEMENT OF MEDIAL MENISCUS OF RIGHT KNEE: ICD-10-CM

## 2024-02-29 PROCEDURE — 97110 THERAPEUTIC EXERCISES: CPT | Performed by: PHYSICAL THERAPIST

## 2024-02-29 PROCEDURE — 97140 MANUAL THERAPY 1/> REGIONS: CPT | Performed by: PHYSICAL THERAPIST

## 2024-02-29 PROCEDURE — 97112 NEUROMUSCULAR REEDUCATION: CPT | Performed by: PHYSICAL THERAPIST

## 2024-02-29 NOTE — PROGRESS NOTES
"Daily Note     Today's date: 2024  Patient name: Sean Thomas  : 1965  MRN: [id number]  Referring provider: Tena Ayala PA-C  Dx:   Encounter Diagnosis     ICD-10-CM    1. Chronic pain of right knee  M25.561     G89.29       2. Derangement of medial meniscus of right knee  M23.303           Start Time: 1631  Stop Time: 1715  Total time in clinic (min): 44 minutes    Subjective: Pt reports no new symptoms, some pain when working on uneven surfaces.       Objective: See treatment diary below      Assessment: Tolerated treatment well. Pt's POC was progressed to include more closed chain interventions. Patient demonstrated fatigue post treatment and would benefit from continued PT      Plan: Continue per plan of care.      POC expires Unit limit Auth Expiration date PT/OT/ST + Visit Limit?   24 N/A N/A 50                                 Visit/Unit Tracking  AUTH Status:  Date           50 Visit Limit Used 1 2 3 4 5           Remaining  49 48 47 46 45                Precautions: BOOTH, HTN, Depression  [employer] Access Code: [email or web address]       Manuals      R Knee PROM JF MM MM JF     R Hams, Calf         AP knee JF        Knee distraction          Neuro Re-Ed          BOSU Lunges         SLS         Tandem Stance         Sidestepping         Leg press BLE and SL  B/L 75# 2x10 B/L 75# 3x10 B/L 75# 3x10     Mini squats    3x10              Ther Ex         NuStep vs Bike 10' 10' Bike 10' Bike 10'              TKE into ball 2x10x5\" 2x10 5\" 2x10 5\" 20x5\"     lunges                  SAQ         SLR  2x10  2# 3x10 2# 3x10     Bridges 3x10  2x10 2x10      S/L Hip Abd  2x10 2# 3x10 2# 3x10     S/L Clamshells         Standing calf stretch         Stair lunge  20x5\" 20x5\" 20x5\" 20x5\"              Ther Activity         Stepups F/L       6\" 2x10   Stepdowns         STS         Gait Training                           Modalities         CP prn                   "

## 2024-03-04 ENCOUNTER — OFFICE VISIT (OUTPATIENT)
Dept: PULMONOLOGY | Facility: CLINIC | Age: 59
End: 2024-03-04
Payer: COMMERCIAL

## 2024-03-04 VITALS
OXYGEN SATURATION: 97 % | HEART RATE: 68 BPM | TEMPERATURE: 99.9 F | DIASTOLIC BLOOD PRESSURE: 70 MMHG | SYSTOLIC BLOOD PRESSURE: 132 MMHG

## 2024-03-04 DIAGNOSIS — G47.33 OSA (OBSTRUCTIVE SLEEP APNEA): Primary | ICD-10-CM

## 2024-03-04 DIAGNOSIS — R00.2 PALPITATIONS: ICD-10-CM

## 2024-03-04 DIAGNOSIS — J45.40 MODERATE PERSISTENT ASTHMA WITHOUT COMPLICATION: ICD-10-CM

## 2024-03-04 PROBLEM — R06.83 SNORING: Status: RESOLVED | Noted: 2022-06-29 | Resolved: 2024-03-04

## 2024-03-04 PROBLEM — U09.9 POST-COVID SYNDROME: Status: RESOLVED | Noted: 2021-08-20 | Resolved: 2024-03-04

## 2024-03-04 PROCEDURE — 99214 OFFICE O/P EST MOD 30 MIN: CPT | Performed by: INTERNAL MEDICINE

## 2024-03-04 NOTE — ASSESSMENT & PLAN NOTE
Compliance report reviewed.  AHI is 1.8 on therapy.  He is on auto-CPAP 4-2, EPR 2, median pressure 9.4, maximum pressure 11.7.  We will continue current therapy.

## 2024-03-04 NOTE — ASSESSMENT & PLAN NOTE
Crescencio has some new symptoms - recurrent throat clearing, mucus production - that may either be related to the inhaler itself or a manifestation of uncontrolled disease.    Will first trial rescue inhaler scheduled throughout the day to see if this alleviates talking-related symptoms  Sample Gayle given, will trial after the above step  Consider testing based on response - imaging v repeat PFTs

## 2024-03-04 NOTE — PROGRESS NOTES
Pulmonary Follow Up Note   Sean Thomas 58 y.o. male MRN: [id number]  3/4/2024    Assessment:    ALEX (obstructive sleep apnea)  Compliance report reviewed.  AHI is 1.8 on therapy.  He is on auto-CPAP 4-2, EPR 2, median pressure 9.4, maximum pressure 11.7.  We will continue current therapy.    Moderate persistent asthma without complication  Crescencio has some new symptoms - recurrent throat clearing, mucus production - that may either be related to the inhaler itself or a manifestation of uncontrolled disease.    Will first trial rescue inhaler scheduled throughout the day to see if this alleviates talking-related symptoms  Sample Breztri given, will trial after the above step  Consider testing based on response - imaging v repeat PFTs    Palpitations  Crescencio has periodic runs of accelerated heart rates that could be suspicious for periodic arrhythmias.  I'm going to refer him back for an earlier follow up with his Cardiologist to discuss.  As they happen infrequently, a standard holter monitor is unlikely to catch an event, so he may need a more long-term monitor.    Plan:    Diagnoses and all orders for this visit:    ALEX (obstructive sleep apnea)   - Continue auto-PAP 4-12    Moderate persistent asthma without complication   - Trial usage of rescue inhaler prevenatitvely   - Continue Trelegy   - Given Breztri sample; advised to switch to this for a week and assess for benefit    Palpitations    Return in about 3 months (around 6/4/2024).    History of Present Illness   HPI:  Sean Thomas is a 58 y.o. male who presents for routine follow up.  He states he hasn't been as active lately due to a torn meniscus requiring medical attention.  He has a couple of acute concerns today.    First off, he reports that he's started to notice some constant throat clearing since the beginning of December.  He had COVID back in September - he was seen in the office since that time and did not have this issue at the time - which  he thinks was unrelated.  This is most apparent when talking, he has some changes to his voice, a lot of phlegm, and a sense that he can't speak at his usual volume.  This happens every day.  He is not sure if it responds to the rescue inhaler as it hasn't been tested in this fashion.    He also notes his blood pressure has been high.  In addition to this he has had occasional episodes where he starts to feel wiped out and tired in the middle of the day with associated shortness of breath and palpitations.  This happens every 1-2 weeks without obvious cause.  It's been a bit more frequent lately, possibly associated with increased pain associated with the meniscal tear.  He asked about changes to blood pressure meds but I advised him to discuss with his Cardiologist as there may be one medication to address both the palpitations and the blood pressure issues.    Answers submitted by the patient for this visit:  Pulmonology Questionnaire (Submitted on 2/26/2024)  Chief Complaint: Primary symptoms  Do you experience frequent throat clearing?: Yes  Do you have a hoarse voice?: Yes  Do you have a wet cough?: Yes  Chronicity: chronic  When did you first notice your symptoms?: 1 to 4 weeks ago  How often do your symptoms occur?: 2 to 4 times per day  Since you first noticed this problem, how has it changed?: unchanged  Do you have shortness of breath that occurs with effort or exertion?: No  Do you have ear congestion?: No  Do you have heartburn?: No  Do you have fatigue?: No  Do you have nasal congestion?: No  Do you have shortness of breath when lying flat?: No  Do you have shortness of breath when you wake up?: No  Do you have sweats?: No  Have you experienced weight loss?: No  Which of the following makes your symptoms worse?: nothing  Which of the following makes your symptoms better?: steroid inhaler  Risk factors for lung disease: animal exposure    Review of Systems   Constitutional:  Negative for appetite change  and fever.   HENT:  Positive for ear pain and postnasal drip. Negative for rhinorrhea, sneezing, sore throat and trouble swallowing.    Respiratory:  Positive for shortness of breath.    Cardiovascular:  Positive for palpitations. Negative for chest pain.   Musculoskeletal:  Negative for myalgias.   Neurological:  Positive for headaches.     Historical Information   Past Medical History:   Diagnosis Date   • Allergic    • Anxiety    • Asthma    • CPAP (continuous positive airway pressure) dependence    • Depression    • Diverticulitis of colon    • Dyspnea on exertion 01/20/2022   • Heart murmur    • Hyperlipidemia    • Hypertension    • Pneumonia 03/2016   • Precordial pain 01/18/2022   • Sleep apnea      Past Surgical History:   Procedure Laterality Date   • APPENDECTOMY     • COLONOSCOPY     • AZ ARTHRS KNE SURG W/MENISCECTOMY MED/LAT W/SHVG Right 2/12/2024    Procedure: KNEE ARTHROSCOPIC PARTIAL MENISCECTOMY MEDIAL;  Surgeon: Morgan Cam DO;  Location: [facility] MAIN OR;  Service: Orthopedics   • VASECTOMY      VasDeferens   • WISDOM TOOTH EXTRACTION       Family History   Problem Relation Age of Onset   • Anxiety disorder Mother    • Hypertension Father    • Hyperlipidemia Father    • No Known Problems Sister    • No Known Problems Sister    • No Known Problems Son    • No Known Problems Son    • No Known Problems Daughter    • Anxiety disorder Family    • Heart disease Family    • Stroke Family        Meds/Allergies     Current Outpatient Medications:   •  acetaminophen (TYLENOL) 325 mg tablet, Take 650 mg by mouth every 6 (six) hours as needed for mild pain, Disp: , Rfl:   •  aspirin 81 mg chewable tablet, Chew 1 tablet (81 mg total) 2 (two) times a day, Disp: 60 tablet, Rfl: 0  •  diphenhydrAMINE (BENADRYL) 25 mg tablet, Take 25 mg by mouth as needed, Disp: , Rfl:   •  escitalopram (LEXAPRO) 5 mg tablet, TAKE 1 TABLET BY MOUTH DAILY (Patient taking differently: Take 5 mg by mouth daily at bedtime), Disp:  90 tablet, Rfl: 1  •  ezetimibe (ZETIA) 10 mg tablet, Take 1 tablet (10 mg total) by mouth daily, Disp: 90 tablet, Rfl: 1  •  levalbuterol (Xopenex HFA) 45 mcg/act inhaler, Inhale 1-2 puffs every 4 (four) hours as needed for wheezing, Disp: 45 g, Rfl: 3  •  meloxicam (MOBIC) 15 mg tablet, Take 1 tablet (15 mg total) by mouth daily as needed for moderate pain, Disp: 60 tablet, Rfl: 0  •  multivitamin (THERAGRAN) TABS, Take 1 tablet by mouth daily, Disp: , Rfl:   •  Triamcinolone Acetonide (Nasacort Allergy) 55 MCG/ACT nasal spray, into each nostril, Disp: , Rfl:   •  triamterene-hydrochlorothiazide (MAXZIDE-25) 37.5-25 mg per tablet, TAKE 1 TABLET BY MOUTH DAILY (Patient taking differently: Take 1 tablet by mouth every morning), Disp: 90 tablet, Rfl: 1  •  fluticasone-umeclidinium-vilanterol (Trelegy Ellipta) 200-62.5-25 mcg/actuation AEPB inhaler, Inhale 1 puff daily Rinse mouth after use. (Patient taking differently: Inhale 1 puff every morning Rinse mouth after use.), Disp: 180 blister, Rfl: 3  •  oxyCODONE (Roxicodone) 5 immediate release tablet, Take 1 tablet (5 mg total) by mouth every 4 (four) hours as needed for moderate pain for up to 15 doses Max Daily Amount: 30 mg, Disp: 15 tablet, Rfl: 0  Allergies   Allergen Reactions   • Budesonide-Formoterol Fumarate Other (See Comments)     Hair loss   • Rosuvastatin Myalgia       Vitals: Blood pressure 132/70, pulse 68, temperature 99.9 °F (37.7 °C), temperature source Tympanic, SpO2 97%. There is no height or weight on file to calculate BMI. Oxygen Therapy  SpO2: 97 %  Oxygen Therapy: None (Room air)    Physical Exam  Physical Exam  Vitals reviewed.   Constitutional:       General: He is not in acute distress.     Appearance: Normal appearance. He is well-developed. He is not ill-appearing.   HENT:      Head: Normocephalic and atraumatic.   Eyes:      General: No scleral icterus.     Conjunctiva/sclera: Conjunctivae normal.   Neck:      Thyroid: No thyromegaly.       Vascular: No JVD.   Cardiovascular:      Rate and Rhythm: Normal rate and regular rhythm.      Heart sounds: Normal heart sounds. No murmur heard.     No friction rub. No gallop.   Pulmonary:      Effort: Pulmonary effort is normal. No respiratory distress.      Breath sounds: Normal breath sounds. No wheezing or rales.   Musculoskeletal:      Cervical back: Neck supple.      Right lower leg: No edema.      Left lower leg: No edema.   Skin:     General: Skin is warm and dry.      Findings: No rash.   Neurological:      General: No focal deficit present.      Mental Status: He is alert and oriented to person, place, and time. Mental status is at baseline.   Psychiatric:         Mood and Affect: Mood normal.         Behavior: Behavior normal.         Labs: I have personally reviewed pertinent lab results.  Lab Results   Component Value Date    WBC 6.57 09/18/2023    HGB 16.8 09/18/2023    HCT 48.6 09/18/2023     (H) 09/18/2023     09/18/2023     Lab Results   Component Value Date    CALCIUM 9.3 09/18/2023    K 4.0 09/18/2023    CO2 33 (H) 09/18/2023     09/18/2023    BUN 14 09/18/2023    CREATININE 1.00 09/18/2023     Lab Results   Component Value Date    IGE 38.3 05/09/2023     Lab Results   Component Value Date    ALT 24 09/18/2023    AST 22 09/18/2023    ALKPHOS 59 09/18/2023       Imaging and other studies: I have personally reviewed relevant films in PACS.    EKG, Pathology, and Other Studies: I have personally reviewed relevant reports in Epic.    Crispin Ratliff M.D.  [facility]

## 2024-03-04 NOTE — ASSESSMENT & PLAN NOTE
Crescencio has periodic runs of accelerated heart rates that could be suspicious for periodic arrhythmias.  I'm going to refer him back for an earlier follow up with his Cardiologist to discuss.  As they happen infrequently, a standard holter monitor is unlikely to catch an event, so he may need a more long-term monitor.

## 2024-03-05 ENCOUNTER — PATIENT MESSAGE (OUTPATIENT)
Dept: CARDIOLOGY CLINIC | Facility: MEDICAL CENTER | Age: 59
End: 2024-03-05

## 2024-03-05 ENCOUNTER — TELEPHONE (OUTPATIENT)
Dept: OBGYN CLINIC | Facility: HOSPITAL | Age: 59
End: 2024-03-05

## 2024-03-05 ENCOUNTER — OFFICE VISIT (OUTPATIENT)
Dept: PHYSICAL THERAPY | Age: 59
End: 2024-03-05
Payer: COMMERCIAL

## 2024-03-05 DIAGNOSIS — G89.29 CHRONIC PAIN OF RIGHT KNEE: Primary | ICD-10-CM

## 2024-03-05 DIAGNOSIS — M25.561 CHRONIC PAIN OF RIGHT KNEE: Primary | ICD-10-CM

## 2024-03-05 DIAGNOSIS — T81.41XA POSTOPERATIVE STITCH ABSCESS: Primary | ICD-10-CM

## 2024-03-05 DIAGNOSIS — R00.2 PALPITATIONS: Primary | ICD-10-CM

## 2024-03-05 DIAGNOSIS — M23.303 DERANGEMENT OF MEDIAL MENISCUS OF RIGHT KNEE: ICD-10-CM

## 2024-03-05 PROCEDURE — 97110 THERAPEUTIC EXERCISES: CPT | Performed by: PHYSICAL THERAPIST

## 2024-03-05 PROCEDURE — 97112 NEUROMUSCULAR REEDUCATION: CPT | Performed by: PHYSICAL THERAPIST

## 2024-03-05 RX ORDER — CEFADROXIL 500 MG/1
500 CAPSULE ORAL EVERY 12 HOURS SCHEDULED
Qty: 14 CAPSULE | Refills: 0 | Status: SHIPPED | OUTPATIENT
Start: 2024-03-05 | End: 2024-03-12

## 2024-03-05 NOTE — TELEPHONE ENCOUNTER
[facility]     Doctor: Morgan Cam    Reason for call: Stevie is concerned about possible infection. He took photos of this for you, it is raised and red around the incision site, looks like a stitch it coming out of it. No Drainage. He is having increased pain.     PT has PA's email and will send pictures there.    Call back#: [phone]

## 2024-03-05 NOTE — PROGRESS NOTES
"Daily Note     Today's date: 3/5/2024  Patient name: Sean Thomas  : 1965  MRN: [id number]  Referring provider: Tena Ayala PA-C  Dx:   Encounter Diagnosis     ICD-10-CM    1. Chronic pain of right knee  M25.561     G89.29       2. Derangement of medial meniscus of right knee  M23.303           Start Time: 1615  Stop Time: 1700  Total time in clinic (min): 45 minutes    Subjective: Pt reports increased pain and soreness in operated extremity.       Objective: See treatment diary below      Assessment: Tolerated treatment well. POC regressed due to redness around incision. Patient's surgeon notified and will contact patient. Patient demonstrated fatigue post treatment and would benefit from continued PT      Plan: Continue per plan of care.      POC expires Unit limit Auth Expiration date PT/OT/ST + Visit Limit?   24 N/A N/A 50                                 Visit/Unit Tracking  AUTH Status:  Date 2/16 2/20 2/23 2/27 2/29 3/5         50 Visit Limit Used 1 2 3 4 5 6          Remaining  49 48 47 46 45 44               Precautions: BOOTH, HTN, Depression  [employer] Access Code: [email or web address]       Manuals      R Knee PROM JF MM MM JF     R Hams, Calf         AP knee JF        Knee distraction          Neuro Re-Ed          BOSU Lunges         SLS         Tandem Stance         Sidestepping         Leg press BLE and SL  B/L 75# 2x10 B/L 75# 3x10      Mini squats                  Ther Ex         NuStep vs Bike 10' 10' Bike 10' Bike 10'              TKE into ball 2x10x5\" 2x10 5\" 2x10 5\"      lunges                  SAQ         SLR  2x10  2# 3x10 3x10     Bridges 3x10  2x10 2x10      S/L Hip Abd  2x10 2# 3x10 3x10     S/L Clamshells         Standing calf stretch         Stair lunge  20x5\" 20x5\" 20x5\" 20x5\"              Ther Activity         Stepups F/L       6\" 2x10   Stepdowns         STS         Gait Training                           Modalities         CP prn                   "

## 2024-03-05 NOTE — TELEPHONE ENCOUNTER
Called and spoke with patient informed him that medication was sent to his pharmacy. Tried to schedule patient tomorrow as per Dr. Cam, patient advised he was leaving for [place] and was not able to come in tomorrow so he is scheduled for Friday 03/08/24.

## 2024-03-07 ENCOUNTER — OFFICE VISIT (OUTPATIENT)
Dept: PHYSICAL THERAPY | Age: 59
End: 2024-03-07
Payer: COMMERCIAL

## 2024-03-07 ENCOUNTER — TELEPHONE (OUTPATIENT)
Dept: CARDIOLOGY CLINIC | Facility: CLINIC | Age: 59
End: 2024-03-07

## 2024-03-07 DIAGNOSIS — M25.561 CHRONIC PAIN OF RIGHT KNEE: Primary | ICD-10-CM

## 2024-03-07 DIAGNOSIS — G89.29 CHRONIC PAIN OF RIGHT KNEE: Primary | ICD-10-CM

## 2024-03-07 DIAGNOSIS — M23.303 DERANGEMENT OF MEDIAL MENISCUS OF RIGHT KNEE: ICD-10-CM

## 2024-03-07 PROCEDURE — 97112 NEUROMUSCULAR REEDUCATION: CPT

## 2024-03-07 PROCEDURE — 97110 THERAPEUTIC EXERCISES: CPT

## 2024-03-07 NOTE — PROGRESS NOTES
"Daily Note     Today's date: 3/7/2024  Patient name: Sean Thomas  : 1965  MRN: [id number]  Referring provider: Tena Ayala PA-C  Dx:   Encounter Diagnosis     ICD-10-CM    1. Chronic pain of right knee  M25.561     G89.29       2. Derangement of medial meniscus of right knee  M23.303                      Subjective: Pt reports he is feeling a little better today than he has been.       Objective: See treatment diary below      Assessment: Tolerated treatment well. Patient would benefit from continued PT      Plan: Continue per plan of care.      POC expires Unit limit Auth Expiration date PT/OT/ST + Visit Limit?   24 N/A N/A 50                                 Visit/Unit Tracking  AUTH Status:  Date 2/16 2/20 2/23 2/27 2/29 3/5 3/7        50 Visit Limit Used 1 2 3 4 5 6 7         Remaining  49 48 47 46 45 44 43              Precautions: BOOTH, HTN, Depression  [employer] Access Code: [email or web address]       Manuals 2/20 2/23 2/27 2/29 3/7    R Knee PROM JF MM MM JF FARAH    R Hams, Calf         AP knee JF        Knee distraction          Neuro Re-Ed          BOSU Lunges         SLS         Tandem Stance         Sidestepping         Leg press BLE and SL  B/L 75# 2x10 B/L 75# 3x10  B/L85# 3x10    Mini squats                  Ther Ex         NuStep vs Bike 10' 10' Bike 10' Bike 10' 10'             TKE into ball 2x10x5\" 2x10 5\" 2x10 5\"      lunges                  SAQ         SLR  2x10  2# 3x10 3x10 3x10    Bridges 3x10  2x10 2x10  Blk TB 3x10    S/L Hip Abd  2x10 2# 3x10 3x10 3x10x    S/L Clamshells         Standing calf stretch         Stair lunge  20x5\" 20x5\" 20x5\" 20x5\" 20x5\"             Ther Activity         Stepups F/L         Stepdowns         STS         Gait Training                           Modalities         CP prn                                                  "

## 2024-03-08 ENCOUNTER — OFFICE VISIT (OUTPATIENT)
Dept: OBGYN CLINIC | Facility: CLINIC | Age: 59
End: 2024-03-08

## 2024-03-08 VITALS
HEIGHT: 69 IN | WEIGHT: 172 LBS | SYSTOLIC BLOOD PRESSURE: 160 MMHG | BODY MASS INDEX: 25.48 KG/M2 | HEART RATE: 73 BPM | DIASTOLIC BLOOD PRESSURE: 90 MMHG | RESPIRATION RATE: 17 BRPM

## 2024-03-08 DIAGNOSIS — S83.241A OTHER TEAR OF MEDIAL MENISCUS, CURRENT INJURY, RIGHT KNEE, INITIAL ENCOUNTER: ICD-10-CM

## 2024-03-08 DIAGNOSIS — T81.41XA POSTOPERATIVE STITCH ABSCESS: Primary | ICD-10-CM

## 2024-03-08 PROCEDURE — 99024 POSTOP FOLLOW-UP VISIT: CPT | Performed by: STUDENT IN AN ORGANIZED HEALTH CARE EDUCATION/TRAINING PROGRAM

## 2024-03-08 NOTE — PROGRESS NOTES
Knee Post Operative Visit     Assesment:     58 y.o. male 3 weeks s/p surgical arthroscopy of the right knee with partial medial meniscectomy and chondroplasty patella, DOS: 2/12/2024, with spitting Monocryl from his lateral portal    Plan:  The patient's diagnosis and treatment were discussed at length today. We discussed no treatment, non-operative treatment, and operative treatment.    Crescencio presents today for follow-up evaluation 3 weeks status post right knee arthroscopy with partial medial meniscectomy chondroplasty patella performed on 2/12/2024.  He presents to the office today with a spitting Monocryl from his lateral portal.  This suture tail was removed at today's visit.  I did provide him with topical triple ointment antibiotic and he should continue to complete his Duricef.  I discussed with him that there is no obvious erythema, redness, or drainage from his incision at this time yielding low concern over infection.  However if his incision does demonstrate any signs of this he should reach out to our office immediately.  He should continue with outpatient physical therapy.  He can continue use ice and over-the-counter medication as needed for pain relief.  He is to follow-up in approximately 2 weeks.    Post-Operative treatment:    Ice to knee for 20 minutes at least 1-2 times daily.  OTC NSAIDS prn for pain.  Continue outpatient physical therapy.  Suture tails were removed at today's visit.  Triple ointment antibiotic provided.  Continue Duricef    Imaging:    All imaging from today was reviewed by myself and explained to the patient.     Weight bearing:  as tolerated     ROM:  Full    Brace:  No brace needed    DVT Prophylaxis:  Ambulation    Follow up:   2 weeks    Patient was advised that if they have any fevers, chills, chest pain, shortness of breath, redness or drainage from the incision, please let our office know immediately.          Chief Complaint   Patient presents with    Right Knee -  "Follow-up       History of Present Illness:    The patient is a 58 y.o. male who is being evaluated post operatively 3 weeks  status post right knee arthroscopy with partial medial meniscectomy and chondroplasty patella performed on 2/12/2024.  He states he is overall doing well at this time and denies any significant pain or discomfort.  He does mention over the last several days he did notice a suture tail appearance on the lateral portal with redness.  He states that he has been compliant with his Duricef antibiotic and has been keeping the wound clean and dry.  He denies any drainage or leakage from his incision.  He denies any significant pain or discomfort limiting his range of motion.  He denies any fever or chills.  He denies any changes in weight or appetite.  He denies any numbness or tingling.       I have reviewed the past medical, surgical, social and family history, medications and allergies as documented in the EMR.    Review of systems: ROS is negative other than that noted in the HPI.  Constitutional: Negative for fatigue and fever.        Physical Exam:    Blood pressure 160/90, pulse 73, resp. rate 17, height 5' 9\" (1.753 m), weight 78 kg (172 lb).    General/Constitutional: NAD, well developed, well nourished  HENT: Normocephalic, atraumatic  CV: Intact distal pulses, regular rate  Resp: No respiratory distress or labored breathing  Lymphatic: No lymphadenopathy palpated  Neuro: Alert and Oriented x 3, no focal deficits  Psych: Normal mood, normal affect, normal judgement, normal behavior  Skin: Warm, dry, no rashes, no erythema      Knee Exam (focused):    Visual inspection of the right knee demonstrates normal contour without atrophy.  Incisions appear clean and dry with no drainage or erythema.  Lateral portal spitting Monocryl, removed.  No redness or warmth to touch.  No tenderness to palpation along either portal incision.  Range of motion full 0 to 130 degrees with no pain.  Able to " straight leg raise without a lag.  Cruciates and collaterals not tested.  Patella tracks equal to contralateral side.  2 quadrants medial to lateral.    Examination of the patient's ipsilateral hip demonstrates full painless range of motion.  No crepitus.     LE NV Exam: +2 DP/PT pulses bilaterally  Sensation intact to light touch L2-S1 bilaterally     Bilateral hip ROM demonstrates no pain actively or passively    No calf tenderness to palpation bilaterally    Scribe Attestation      I,:  Anthony Betts am acting as a scribe while in the presence of the attending physician.:       I,:  Morgan Cam, DO personally performed the services described in this documentation    as scribed in my presence.:

## 2024-03-11 ENCOUNTER — PATIENT MESSAGE (OUTPATIENT)
Dept: PULMONOLOGY | Facility: CLINIC | Age: 59
End: 2024-03-11

## 2024-03-12 ENCOUNTER — APPOINTMENT (OUTPATIENT)
Dept: PHYSICAL THERAPY | Age: 59
End: 2024-03-12
Payer: COMMERCIAL

## 2024-03-12 DIAGNOSIS — I10 ESSENTIAL HYPERTENSION: Primary | ICD-10-CM

## 2024-03-12 RX ORDER — LOSARTAN POTASSIUM 50 MG/1
50 TABLET ORAL
Qty: 30 TABLET | Refills: 3 | Status: SHIPPED | OUTPATIENT
Start: 2024-03-12

## 2024-03-13 ENCOUNTER — OFFICE VISIT (OUTPATIENT)
Dept: FAMILY MEDICINE CLINIC | Facility: MEDICAL CENTER | Age: 59
End: 2024-03-13
Payer: COMMERCIAL

## 2024-03-13 ENCOUNTER — HOSPITAL ENCOUNTER (OUTPATIENT)
Dept: CT IMAGING | Facility: HOSPITAL | Age: 59
Discharge: HOME | End: 2024-03-13
Payer: COMMERCIAL

## 2024-03-13 VITALS
SYSTOLIC BLOOD PRESSURE: 133 MMHG | DIASTOLIC BLOOD PRESSURE: 88 MMHG | WEIGHT: 175 LBS | HEART RATE: 76 BPM | BODY MASS INDEX: 25.84 KG/M2

## 2024-03-13 DIAGNOSIS — H53.9 CHANGES IN VISION: ICD-10-CM

## 2024-03-13 DIAGNOSIS — J45.40 MODERATE PERSISTENT ASTHMA WITHOUT COMPLICATION: ICD-10-CM

## 2024-03-13 DIAGNOSIS — F41.1 GAD (GENERALIZED ANXIETY DISORDER): ICD-10-CM

## 2024-03-13 DIAGNOSIS — G44.039 EPISODIC PAROXYSMAL HEMICRANIA, NOT INTRACTABLE: ICD-10-CM

## 2024-03-13 DIAGNOSIS — I10 ESSENTIAL HYPERTENSION: ICD-10-CM

## 2024-03-13 DIAGNOSIS — G44.039 EPISODIC PAROXYSMAL HEMICRANIA, NOT INTRACTABLE: Primary | ICD-10-CM

## 2024-03-13 PROCEDURE — 99214 OFFICE O/P EST MOD 30 MIN: CPT | Performed by: FAMILY MEDICINE

## 2024-03-13 PROCEDURE — 70450 CT HEAD/BRAIN W/O DYE: CPT

## 2024-03-13 NOTE — ASSESSMENT & PLAN NOTE
He is taking Zetia.  His LDL is in the 140s.  He cannot tolerate statins.    Continue Zetia, Mediterranean diet otherwise low saturated fat diet, keep his weight down and get regular exercise.

## 2024-03-13 NOTE — PROGRESS NOTES
Name: Sean Thomas      : 1965      MRN: [id number]  Encounter Provider: John Kinney MD  Encounter Date: 3/13/2024   Encounter department: [facility]    Assessment & Plan     1. Episodic paroxysmal hemicrania, not intractable  Assessment & Plan:  He has left-sided headaches, he describes it as 12/10 when it started about a week ago.  He attributed to Trelegy, see under asthma, and he is still having headaches and they come and go.  From like 2/10 up to 810.  He also describes some visual changes in his left eye and around his eye when the headache hit, he had swelling around the eye and redness.    The swelling is going down however he still has some swelling.    No other focal findings or symptoms on neurological review of systems or exam.  No nausea or vomiting.    Orders:  -     CT head wo contrast; Future; Expected date: 2024    2. Essential hypertension  Assessment & Plan:  His blood pressure today was 133/88.  He is seeing a cardiologist, and the cardiologist started losartan yesterday.  However the patient did not understand that it was in addition to the Maxide then I am prescribing not replacing it.    So take Maxide and the losartan, continue following up with cardiology.    Continue low-salt diet, try to get some exercise.      3. Moderate persistent asthma without complication  Assessment & Plan:  He has been following up with pulmonary.    The pulmonary changed his inhaler from Breo to Trelegy.  Because of mucus production and clearing of his throat.  There is not much improvement.  The thinking is that this is long-haul COVID.    When he went to [facility], he had a severe headache and that was about a week or so ago.  He still has the headache and he had swelling around his eye, however the swelling is starting to go down.  The headache and the eye was on the left.    He is going to his pulmonologist to go back to Breo and see if that improves the  headache.  Continue follow-up with the pulmonary, and because of the production of mucus, and not 100% responding to the inhalers, maybe we can think about GERD.  Also the pulmonologist is planning some other testing.      4. GABBY (generalized anxiety disorder)  Assessment & Plan:  He discontinue the Lexapro.  He did not have any withdrawal symptoms.  He is doing well.      5. Changes in vision  -     CT head wo contrast; Future; Expected date: 2024           Subjective      HPI  Review of Systems   Constitutional:  Negative for activity change, fatigue and fever.   HENT:  Positive for postnasal drip. Negative for congestion, ear discharge, ear pain, rhinorrhea, sinus pain, sneezing and sore throat.    Eyes:  Negative for photophobia, pain, discharge and redness.   Respiratory:  Positive for cough. Negative for apnea, shortness of breath and wheezing.    Cardiovascular:  Negative for chest pain and palpitations.   Gastrointestinal:  Negative for abdominal pain, blood in stool, constipation, diarrhea, nausea and vomiting.   Endocrine: Negative for polydipsia, polyphagia and polyuria.   Genitourinary:  Negative for decreased urine volume, difficulty urinating, dysuria, frequency, penile discharge, penile pain and urgency.   Musculoskeletal:  Negative for arthralgias, gait problem, joint swelling and neck pain.   Skin:  Negative for color change and rash.   Neurological:  Positive for headaches. Negative for dizziness, tremors, seizures, weakness and light-headedness.   Psychiatric/Behavioral:  Negative for agitation and sleep disturbance. The patient is not nervous/anxious.        Current Outpatient Medications on File Prior to Visit   Medication Sig   • acetaminophen (TYLENOL) 325 mg tablet Take 650 mg by mouth every 6 (six) hours as needed for mild pain   • aspirin 81 mg chewable tablet Chew 1 tablet (81 mg total) 2 (two) times a day   • [] cefadroxil (DURICEF) 500 mg capsule Take 1 capsule (500 mg  total) by mouth every 12 (twelve) hours for 7 days   • diphenhydrAMINE (BENADRYL) 25 mg tablet Take 25 mg by mouth as needed   • ezetimibe (ZETIA) 10 mg tablet Take 1 tablet (10 mg total) by mouth daily   • fluticasone-umeclidinium-vilanterol (Trelegy Ellipta) 200-62.5-25 mcg/actuation AEPB inhaler Inhale 1 puff daily Rinse mouth after use. (Patient taking differently: Inhale 1 puff every morning Rinse mouth after use.)   • levalbuterol (Xopenex HFA) 45 mcg/act inhaler Inhale 1-2 puffs every 4 (four) hours as needed for wheezing   • losartan (COZAAR) 50 mg tablet Take 1 tablet (50 mg total) by mouth daily at bedtime   • meloxicam (MOBIC) 15 mg tablet Take 1 tablet (15 mg total) by mouth daily as needed for moderate pain   • multivitamin (THERAGRAN) TABS Take 1 tablet by mouth daily   • Triamcinolone Acetonide (Nasacort Allergy) 55 MCG/ACT nasal spray into each nostril   • triamterene-hydrochlorothiazide (MAXZIDE-25) 37.5-25 mg per tablet TAKE 1 TABLET BY MOUTH DAILY (Patient taking differently: Take 1 tablet by mouth every morning)   • [DISCONTINUED] escitalopram (LEXAPRO) 5 mg tablet TAKE 1 TABLET BY MOUTH DAILY (Patient taking differently: Take 5 mg by mouth daily at bedtime)       Objective     /88   Pulse 76   Wt 79.4 kg (175 lb)   BMI 25.84 kg/m²     Physical Exam  John Kinney MD

## 2024-03-13 NOTE — ASSESSMENT & PLAN NOTE
He has been following up with pulmonary.    The pulmonary changed his inhaler from Breo to Trelegy.  Because of mucus production and clearing of his throat.  There is not much improvement.  The thinking is that this is long-haul COVID.    When he went to [facility], he had a severe headache and that was about a week or so ago.  He still has the headache and he had swelling around his eye, however the swelling is starting to go down.  The headache and the eye was on the left.    He is going to his pulmonologist to go back to Breo and see if that improves the headache.  Continue follow-up with the pulmonary, and because of the production of mucus, and not 100% responding to the inhalers, maybe we can think about GERD.  Also the pulmonologist is planning some other testing.

## 2024-03-13 NOTE — ASSESSMENT & PLAN NOTE
His blood pressure today was 133/88.  He is seeing a cardiologist, and the cardiologist started losartan yesterday.  However the patient did not understand that it was in addition to the Maxide then I am prescribing not replacing it.    So take Maxide and the losartan, continue following up with cardiology.    Continue low-salt diet, try to get some exercise.

## 2024-03-13 NOTE — ASSESSMENT & PLAN NOTE
He has left-sided headaches, he describes it as 12/10 when it started about a week ago.  He attributed to Trelegy, see under asthma, and he is still having headaches and they come and go.  From like 2/10 up to 8/10.  He also describes some visual changes in his left eye and around his eye when the headache hit, he had swelling around the eye and redness.    The swelling is going down however he still has some swelling.    No other focal findings or symptoms on neurological review of systems or exam.  No nausea or vomiting.

## 2024-03-14 ENCOUNTER — OFFICE VISIT (OUTPATIENT)
Dept: PHYSICAL THERAPY | Age: 59
End: 2024-03-14
Payer: COMMERCIAL

## 2024-03-14 DIAGNOSIS — M23.303 DERANGEMENT OF MEDIAL MENISCUS OF RIGHT KNEE: ICD-10-CM

## 2024-03-14 DIAGNOSIS — M25.561 CHRONIC PAIN OF RIGHT KNEE: Primary | ICD-10-CM

## 2024-03-14 DIAGNOSIS — G89.29 CHRONIC PAIN OF RIGHT KNEE: Primary | ICD-10-CM

## 2024-03-14 PROCEDURE — 97112 NEUROMUSCULAR REEDUCATION: CPT

## 2024-03-14 PROCEDURE — 97140 MANUAL THERAPY 1/> REGIONS: CPT

## 2024-03-14 PROCEDURE — 97110 THERAPEUTIC EXERCISES: CPT

## 2024-03-14 NOTE — PROGRESS NOTES
"Daily Note     Today's date: 3/14/2024  Patient name: Sean Thomas  : 1965  MRN: [id number]  Referring provider: Tena Ayala PA-C  Dx:   Encounter Diagnosis     ICD-10-CM    1. Chronic pain of right knee  M25.561     G89.29       2. Derangement of medial meniscus of right knee  M23.303                      Subjective: Pt reports he is feeling a little better today than he has been. Reports feeling minimal pain following last session.       Objective: See treatment diary below      Assessment: Tolerated treatment well. Minimal progressions were made today to promote LE strengthening this session. Pt was fatigued at the end of session with DOMS education provided. Patient would benefit from continued PT      Plan: Continue per plan of care.      POC expires Unit limit Auth Expiration date PT/OT/ST + Visit Limit?   24 N/A N/A 50                                 Visit/Unit Tracking  AUTH Status:  Date 2/16 2/20 2/23 2/27 2/29 3/5 3/7 3/14       50 Visit Limit Used 1 2 3 4 5 6 7 8        Remaining  49 48 47 46 45 44 43 42             Precautions: BOOTH, HTN, Depression  [facility] Access Code: [email or web address]       Manuals 2/20 2/23 2/27 2/29 3/7 3/14   R Knee PROM JF MM MM JF FARAH MM   R Hams, Calf         AP knee JF        Knee distraction          Neuro Re-Ed          BOSU Lunges         SLS         Tandem Stance         Sidestepping         Leg press BLE and SL  B/L 75# 2x10 B/L 75# 3x10  B/L85# 3x10 B/L 85# 3x10  S/L 55# 2x10   Mini squats                  Ther Ex         NuStep vs Bike 10' 10' Bike 10' Bike 10' 10' 10'            TKE into ball 2x10x5\" 2x10 5\" 2x10 5\"      lunges                  SAQ         SLR  2x10  2# 3x10 3x10 3x10 2# 2x30   Bridges 3x10  2x10 2x10  Blk TB 3x10 3x10 no TB   S/L Hip Abd  2x10 2# 3x10 3x10 3x10x 2# 3x10   S/L Clamshells         Standing calf stretch         Stair lunge  20x5\" 20x5\" 20x5\" 20x5\" 20x5\" 20x5\"            Ther Activity         Stepups F/L         Stepdowns     "     STS      Hi- Lo 2x10   Gait Training                           Modalities         CP prn

## 2024-03-15 DIAGNOSIS — E78.00 PURE HYPERCHOLESTEROLEMIA: ICD-10-CM

## 2024-03-15 RX ORDER — EZETIMIBE 10 MG/1
10 TABLET ORAL DAILY
Qty: 90 TABLET | Refills: 1 | Status: SHIPPED | OUTPATIENT
Start: 2024-03-15

## 2024-03-18 ENCOUNTER — PATIENT MESSAGE (OUTPATIENT)
Dept: PULMONOLOGY | Facility: CLINIC | Age: 59
End: 2024-03-18

## 2024-03-19 ENCOUNTER — OFFICE VISIT (OUTPATIENT)
Dept: PHYSICAL THERAPY | Age: 59
End: 2024-03-19
Payer: COMMERCIAL

## 2024-03-19 DIAGNOSIS — M25.561 CHRONIC PAIN OF RIGHT KNEE: Primary | ICD-10-CM

## 2024-03-19 DIAGNOSIS — G44.039 EPISODIC PAROXYSMAL HEMICRANIA, NOT INTRACTABLE: Primary | ICD-10-CM

## 2024-03-19 DIAGNOSIS — M23.303 DERANGEMENT OF MEDIAL MENISCUS OF RIGHT KNEE: ICD-10-CM

## 2024-03-19 DIAGNOSIS — G89.29 CHRONIC PAIN OF RIGHT KNEE: Primary | ICD-10-CM

## 2024-03-19 PROCEDURE — 97110 THERAPEUTIC EXERCISES: CPT

## 2024-03-19 PROCEDURE — 97112 NEUROMUSCULAR REEDUCATION: CPT

## 2024-03-19 PROCEDURE — 97140 MANUAL THERAPY 1/> REGIONS: CPT

## 2024-03-19 RX ORDER — TIZANIDINE 4 MG/1
4 TABLET ORAL EVERY 8 HOURS PRN
Qty: 21 TABLET | Refills: 1 | Status: SHIPPED | OUTPATIENT
Start: 2024-03-19

## 2024-03-19 NOTE — PROGRESS NOTES
"Daily Note     Today's date: 3/19/2024  Patient name: Sean Thomas  : 1965  MRN: [id number]  Referring provider: Tena Ayala PA-C  Dx:   Encounter Diagnosis     ICD-10-CM    1. Chronic pain of right knee  M25.561     G89.29       2. Derangement of medial meniscus of right knee  M23.303                      Subjective: Pt reports he is feeling a little better today than he has been. Reports feeling minimal pain following last session. He still notices discomfort with ambulation in crowds such as the grocery store, side stepping etc.       Objective: See treatment diary below      Assessment: Tolerated treatment well. Minimal progressions were made today to promote LE strengthening this session. Added in side stepping and monster walks along with an updated HEP with compliance present. Pt was fatigued at the end of session with DOMS education provided. Patient would benefit from continued PT      Plan: Continue per plan of care.      POC expires Unit limit Auth Expiration date PT/OT/ST + Visit Limit?   24 N/A N/A 50                                 Visit/Unit Tracking  AUTH Status:  Date 2/16 2/20 2/23 2/27 2/29 3/5 3/7 3/14 3/19      50 Visit Limit Used 1 2 3 4 5 6 7 8 9 foto       Remaining  49 48 47 46 45 44 43 42 41            Precautions: BOOTH, HTN, Depression  [facility] Access Code: [email or web address]       Manuals 3/19  2/27 2/29 3/7 3/14   R Knee PROM MM  MM JF FARAH MM   R Hams, Calf         AP knee         Knee distraction          Neuro Re-Ed          BOSU Lunges         SLS         Tandem Stance         Sidestepping RTB 15' 4 laps HEP        Monster walks RTB 15' 4 laps HEP        Leg press BLE and SL 95# 2x10, 105# 10x  S/L  B/L 75# 3x10  B/L85# 3x10 B/L 85# 3x10  S/L 55# 2x10   Mini squats                  Ther Ex         NuStep vs Bike Bike 10'  10' Bike 10' 10' 10'            TKE into ball   2x10 5\"      lunges         Piriformis stretch 10\"x10 HEP        SAQ         SLR 2# x30  2# 3x10 3x10 " "3x10 2# 2x30   Bridges 3x10 no TB  2x10 2x10  Blk TB 3x10 3x10 no TB   S/L Hip Abd 2# x30  2# 3x10 3x10 3x10x 2# 3x10   S/L Clamshells         Standing calf stretch         Stair lunge  HEP  20x5\" 20x5\" 20x5\" 20x5\"            Ther Activity         Stepups F/L         Stepdowns         STS Hi lo 2x10     Hi- Lo 2x10   Gait Training                           Modalities         CP prn                                                  "

## 2024-03-20 ENCOUNTER — OFFICE VISIT (OUTPATIENT)
Dept: OBGYN CLINIC | Facility: CLINIC | Age: 59
End: 2024-03-20

## 2024-03-20 VITALS
HEIGHT: 69 IN | SYSTOLIC BLOOD PRESSURE: 149 MMHG | BODY MASS INDEX: 25.92 KG/M2 | WEIGHT: 175 LBS | HEART RATE: 66 BPM | RESPIRATION RATE: 17 BRPM | DIASTOLIC BLOOD PRESSURE: 84 MMHG

## 2024-03-20 DIAGNOSIS — S83.241A OTHER TEAR OF MEDIAL MENISCUS, CURRENT INJURY, RIGHT KNEE, INITIAL ENCOUNTER: Primary | ICD-10-CM

## 2024-03-20 DIAGNOSIS — T81.41XA POSTOPERATIVE STITCH ABSCESS: ICD-10-CM

## 2024-03-20 PROCEDURE — 99024 POSTOP FOLLOW-UP VISIT: CPT | Performed by: STUDENT IN AN ORGANIZED HEALTH CARE EDUCATION/TRAINING PROGRAM

## 2024-03-21 ENCOUNTER — OFFICE VISIT (OUTPATIENT)
Dept: PHYSICAL THERAPY | Age: 59
End: 2024-03-21
Payer: COMMERCIAL

## 2024-03-21 DIAGNOSIS — G89.29 CHRONIC PAIN OF RIGHT KNEE: Primary | ICD-10-CM

## 2024-03-21 DIAGNOSIS — M25.561 CHRONIC PAIN OF RIGHT KNEE: Primary | ICD-10-CM

## 2024-03-21 DIAGNOSIS — M23.303 DERANGEMENT OF MEDIAL MENISCUS OF RIGHT KNEE: ICD-10-CM

## 2024-03-21 PROCEDURE — 97112 NEUROMUSCULAR REEDUCATION: CPT | Performed by: PHYSICAL THERAPIST

## 2024-03-21 PROCEDURE — 97140 MANUAL THERAPY 1/> REGIONS: CPT | Performed by: PHYSICAL THERAPIST

## 2024-03-21 PROCEDURE — 97110 THERAPEUTIC EXERCISES: CPT | Performed by: PHYSICAL THERAPIST

## 2024-03-21 NOTE — PROGRESS NOTES
PT D/C NOTE    Today's date: 3/21/2024  Patient name: Sean Thomas  : 1965  MRN: [id number]  Referring provider: PATTIE Strong  Dx:   Encounter Diagnosis     ICD-10-CM    1. Chronic pain of right knee M25.561    2.      Derangement of medial meniscus of right knee    M23.303            Assessment  Assessment details: The patient is a 57 y/o male who presents to PT with diagnosis of R knee medial meniscus injury.  Patient underwent R knee menisectomy on 24 performed by Dr. Morgan Cam. Patient doing very well and will be discharged with HEP.     Impairments: abnormal gait, abnormal or restricted ROM, activity intolerance, impaired balance, impaired physical strength, lacks appropriate home exercise program, pain with function and weight-bearing intolerance  Other impairment: decreased flexibility  Functional limitations: pain with stair negotiationUnderstanding of Dx/Px/POC: good   Prognosis: good    Goals - ALL MET  STGs:  1.  Initiate and complete HEP with verbal cues.  2.  Decrease R knee pain by > 25% in 4 weeks.  3.  Improve RLE strength by 1/2 grade in 4 weeks.  LTGs:  1.  Patient to be I with HEP in 8 weeks.  2. Improve R knee ROM to 0-130 degrees with decreased tightness at end range in 8 weeks to improve function.    3. Improve R LE strength to 5/5 t/o in 8 weeks to improve function.  4. Decrease R knee pain to < or = to 2-3/10 with activity in 8 weeks to improve function.  5.  Stair negotiation is improved to PLOF in 8 weeks.  6.  Recreational performance is improved to PLOF in 8 weeks.  7.  ADL performance is improved to PLOF in 8 weeks.  8.  Work performance is improved to maximal level of function in 8 weeks.         Plan: D/c PT            Ceja  Visit/Unit Tracking  AUTH Status:  Date 3/21              50 Visit Limit Used 11               Remaining  39                    Precautions: BOOTH, HTN, Depression       Manuals 3/21  2/27 2/29 3/7 3/14   R Knee PROM JF  MM JF HA MM   R  "Hams, Calf         AP knee         Knee distraction          Neuro Re-Ed          BOSU Lunges         SLS         Tandem Stance         Sidestepping RTB 15' 4 laps HEP        Monster walks RTB 15' 4 laps HEP        Leg press BLE and SL 95# 2x10, 105# 10x  S/L  B/L 75# 3x10  B/L85# 3x10 B/L 85# 3x10  S/L 55# 2x10   Mini squats                  Ther Ex         NuStep vs Bike Bike 10'  10' Bike 10' 10' 10'            TKE into ball   2x10 5\"      lunges         Piriformis stretch 10\"x10 HEP        SAQ         SLR 2# x30  2# 3x10 3x10 3x10 2# 2x30   Bridges 3x10 no TB  2x10 2x10  Blk TB 3x10 3x10 no TB   S/L Hip Abd 2# x30  2# 3x10 3x10 3x10x 2# 3x10   S/L Clamshells         Standing calf stretch         Stair lunge  HEP  20x5\" 20x5\" 20x5\" 20x5\"            Ther Activity         Stepups F/L         Stepdowns         STS Hi lo 2x10     Hi- Lo 2x10   Gait Training                           Modalities         CP prn                           "

## 2024-03-26 ENCOUNTER — APPOINTMENT (OUTPATIENT)
Dept: PHYSICAL THERAPY | Age: 59
End: 2024-03-26
Payer: COMMERCIAL

## 2024-03-28 ENCOUNTER — APPOINTMENT (OUTPATIENT)
Dept: PHYSICAL THERAPY | Age: 59
End: 2024-03-28
Payer: COMMERCIAL

## 2024-03-29 DIAGNOSIS — R00.2 PALPITATIONS: ICD-10-CM

## 2024-04-01 DIAGNOSIS — G44.039 EPISODIC PAROXYSMAL HEMICRANIA, NOT INTRACTABLE: ICD-10-CM

## 2024-04-01 DIAGNOSIS — H02.402 PTOSIS OF LEFT EYELID: Primary | ICD-10-CM

## 2024-04-01 NOTE — RESULT ENCOUNTER NOTE
[facility]    Event Recorder Results    Duration: 14 days    Indication: Palpitations    Findings:  Patient had a min HR of 49 bpm, max HR of 197 bpm, and avg HR of 79 bpm. Predominant underlying rhythm was Sinus Rhythm. 5 Supraventricular runs occurred, the run with the fastest interval lasting 4 beats with a max rate of  197 bpm, the longest lasting 9 beats with an avg rate of 138 bpm. Some episodes of Supraventricular Tachycardia may be possible Atrial Tachycardia with variable block. Supraventricular Tachycardia was detected within +/- 45 seconds of symptomatic patient event(s). Isolated SVEs were rare (<1.0%), SVE Couplets were rare (<1.0%), and no SVE Triplets were present. Isolated VEs were rare (<1.0%), VE Couplets were rare (<1.0%), and no VE Triplets were present. Ventricular Bigeminy and Trigeminy were present.    Triggered episodes: Correlated with SR, PACs and frequent PVCs    Conclusions:  Sinus rhythm with PACs and PVCs with partial symptom correlation.

## 2024-04-08 ENCOUNTER — OFFICE VISIT (OUTPATIENT)
Dept: FAMILY MEDICINE CLINIC | Facility: MEDICAL CENTER | Age: 59
End: 2024-04-08
Payer: COMMERCIAL

## 2024-04-08 ENCOUNTER — APPOINTMENT (OUTPATIENT)
Dept: LAB | Facility: MEDICAL CENTER | Age: 59
End: 2024-04-08
Payer: COMMERCIAL

## 2024-04-08 VITALS
BODY MASS INDEX: 24.66 KG/M2 | WEIGHT: 167 LBS | DIASTOLIC BLOOD PRESSURE: 78 MMHG | RESPIRATION RATE: 16 BRPM | SYSTOLIC BLOOD PRESSURE: 122 MMHG | HEART RATE: 108 BPM | OXYGEN SATURATION: 99 % | TEMPERATURE: 99.6 F

## 2024-04-08 DIAGNOSIS — A90 DENGUE FEVER: Primary | ICD-10-CM

## 2024-04-08 DIAGNOSIS — A90 DENGUE FEVER: ICD-10-CM

## 2024-04-08 LAB
ALBUMIN SERPL BCP-MCNC: 4.5 G/DL (ref 3.5–5)
ALP SERPL-CCNC: 65 U/L (ref 34–104)
ALT SERPL W P-5'-P-CCNC: 105 U/L (ref 7–52)
ANION GAP SERPL CALCULATED.3IONS-SCNC: 13 MMOL/L (ref 4–13)
AST SERPL W P-5'-P-CCNC: 113 U/L (ref 13–39)
BASOPHILS # BLD MANUAL: 0 THOUSAND/UL (ref 0–0.1)
BASOPHILS NFR MAR MANUAL: 0 % (ref 0–1)
BILIRUB SERPL-MCNC: 0.94 MG/DL (ref 0.2–1)
BUN SERPL-MCNC: 14 MG/DL (ref 5–25)
CALCIUM SERPL-MCNC: 10.1 MG/DL (ref 8.4–10.2)
CHLORIDE SERPL-SCNC: 89 MMOL/L (ref 96–108)
CO2 SERPL-SCNC: 29 MMOL/L (ref 21–32)
CREAT SERPL-MCNC: 1.26 MG/DL (ref 0.6–1.3)
EOSINOPHIL # BLD MANUAL: 0.21 THOUSAND/UL (ref 0–0.4)
EOSINOPHIL NFR BLD MANUAL: 2 % (ref 0–6)
ERYTHROCYTE [DISTWIDTH] IN BLOOD BY AUTOMATED COUNT: 12.5 % (ref 11.6–15.1)
GFR SERPL CREATININE-BSD FRML MDRD: 62 ML/MIN/1.73SQ M
GIANT PLATELETS BLD QL SMEAR: PRESENT
GLUCOSE SERPL-MCNC: 111 MG/DL (ref 65–140)
HCT VFR BLD AUTO: 53.3 % (ref 36.5–49.3)
HGB BLD-MCNC: 18.3 G/DL (ref 12–17)
LYMPHOCYTES # BLD AUTO: 1.49 THOUSAND/UL (ref 0.6–4.47)
LYMPHOCYTES # BLD AUTO: 8 % (ref 14–44)
MCH RBC QN AUTO: 32.6 PG (ref 26.8–34.3)
MCHC RBC AUTO-ENTMCNC: 34.3 G/DL (ref 31.4–37.4)
MCV RBC AUTO: 95 FL (ref 82–98)
MONOCYTES # BLD AUTO: 1.39 THOUSAND/UL (ref 0–1.22)
MONOCYTES NFR BLD: 13 % (ref 4–12)
NEUTROPHILS # BLD MANUAL: 7.57 THOUSAND/UL (ref 1.85–7.62)
NEUTS BAND NFR BLD MANUAL: 5 % (ref 0–8)
NEUTS SEG NFR BLD AUTO: 66 % (ref 43–75)
PLATELET # BLD AUTO: 141 THOUSANDS/UL (ref 149–390)
PLATELET BLD QL SMEAR: ADEQUATE
PMV BLD AUTO: 12.1 FL (ref 8.9–12.7)
POTASSIUM SERPL-SCNC: 3.9 MMOL/L (ref 3.5–5.3)
PROT SERPL-MCNC: 7.9 G/DL (ref 6.4–8.4)
RBC # BLD AUTO: 5.62 MILLION/UL (ref 3.88–5.62)
RBC MORPH BLD: NORMAL
SODIUM SERPL-SCNC: 131 MMOL/L (ref 135–147)
VARIANT LYMPHS # BLD AUTO: 6 %
WBC # BLD AUTO: 10.66 THOUSAND/UL (ref 4.31–10.16)

## 2024-04-08 PROCEDURE — 99213 OFFICE O/P EST LOW 20 MIN: CPT | Performed by: FAMILY MEDICINE

## 2024-04-08 PROCEDURE — 80053 COMPREHEN METABOLIC PANEL: CPT

## 2024-04-08 PROCEDURE — 36415 COLL VENOUS BLD VENIPUNCTURE: CPT

## 2024-04-08 PROCEDURE — 85007 BL SMEAR W/DIFF WBC COUNT: CPT

## 2024-04-08 PROCEDURE — 87798 DETECT AGENT NOS DNA AMP: CPT

## 2024-04-08 PROCEDURE — 85027 COMPLETE CBC AUTOMATED: CPT

## 2024-04-08 NOTE — PROGRESS NOTES
Name: Sean Thomas      : 1965      MRN: [id number]  Encounter Provider: John Kinney MD  Encounter Date: 2024   Encounter department: [facility]    Assessment & Plan     1. Dengue fever  Assessment & Plan:  This 58-year-old man that went down to [place] for vacation.  About 5 days ago he had a rash, like a sunburn but he was not in the sun.  Nausea, diarrhea (nonbloody), body aches and headaches.    It is completely consistent with dengue fever.    Is supportive care only, however he will watch for unusual bleeding, confusion or passing out.  If that happens I advised him to go to the ER.    I will check a CBC, CMP and a dengue PCL test.  Make sure that he is well-hydrated, Tylenol as needed for body aches.  He can take Benadryl for itching rash.    Orders:  -     Comprehensive metabolic panel; Future  -     CBC and differential; Future  -     DENGUE VIRUS RNA, QL REAL TIME PCR; Future           Subjective      Review of Systems   Constitutional:  Negative for chills and fever.   HENT:  Positive for rhinorrhea. Negative for congestion, ear pain, nosebleeds, sinus pain and sore throat.    Eyes:  Negative for pain and visual disturbance.   Respiratory:  Negative for cough and shortness of breath.    Cardiovascular:  Negative for chest pain and palpitations.   Gastrointestinal:  Positive for diarrhea and nausea. Negative for abdominal pain, blood in stool and vomiting.   Genitourinary:  Negative for dysuria and hematuria.   Musculoskeletal:  Positive for arthralgias and myalgias. Negative for back pain, gait problem, joint swelling, neck pain and neck stiffness.   Skin:  Negative for color change and rash.   Neurological:  Negative for dizziness, seizures, syncope, weakness and light-headedness.   All other systems reviewed and are negative.      Current Outpatient Medications on File Prior to Visit   Medication Sig   • acetaminophen (TYLENOL) 325 mg tablet Take 650 mg by  mouth every 6 (six) hours as needed for mild pain   • diphenhydrAMINE (BENADRYL) 25 mg tablet Take 25 mg by mouth as needed   • ezetimibe (ZETIA) 10 mg tablet Take 1 tablet (10 mg total) by mouth daily   • fluticasone-umeclidinium-vilanterol (Trelegy Ellipta) 200-62.5-25 mcg/actuation AEPB inhaler Inhale 1 puff daily Rinse mouth after use. (Patient taking differently: Inhale 1 puff every morning Rinse mouth after use.)   • levalbuterol (Xopenex HFA) 45 mcg/act inhaler Inhale 1-2 puffs every 4 (four) hours as needed for wheezing   • losartan (COZAAR) 50 mg tablet Take 1 tablet (50 mg total) by mouth daily at bedtime   • meloxicam (MOBIC) 15 mg tablet Take 1 tablet (15 mg total) by mouth daily as needed for moderate pain   • multivitamin (THERAGRAN) TABS Take 1 tablet by mouth daily   • tiZANidine (ZANAFLEX) 4 mg tablet Take 1 tablet (4 mg total) by mouth every 8 (eight) hours as needed for muscle spasms   • Triamcinolone Acetonide (Nasacort Allergy) 55 MCG/ACT nasal spray into each nostril   • triamterene-hydrochlorothiazide (MAXZIDE-25) 37.5-25 mg per tablet TAKE 1 TABLET BY MOUTH DAILY (Patient taking differently: Take 1 tablet by mouth every morning)   • aspirin 81 mg chewable tablet Chew 1 tablet (81 mg total) 2 (two) times a day       Objective     /78 (BP Location: Left arm, Patient Position: Sitting, Cuff Size: Standard)   Pulse (!) 108   Temp 99.6 °F (37.6 °C) (Temporal)   Resp 16   Wt 75.8 kg (167 lb)   SpO2 99%   BMI 24.66 kg/m²     Physical Exam  Vitals and nursing note reviewed.   Constitutional:       General: He is in acute distress.      Appearance: Normal appearance. He is well-developed. He is not ill-appearing, toxic-appearing or diaphoretic.   HENT:      Head: Normocephalic and atraumatic.      Right Ear: Tympanic membrane, ear canal and external ear normal.      Left Ear: Tympanic membrane, ear canal and external ear normal.      Nose: Nose normal. No congestion or rhinorrhea.       Mouth/Throat:      Mouth: Mucous membranes are moist.   Eyes:      General: Lids are normal.      Extraocular Movements: Extraocular movements intact.      Conjunctiva/sclera: Conjunctivae normal.      Pupils: Pupils are equal, round, and reactive to light.   Neck:      Thyroid: No thyromegaly.      Vascular: No carotid bruit.   Cardiovascular:      Rate and Rhythm: Normal rate and regular rhythm.      Pulses: Normal pulses.      Heart sounds: Normal heart sounds, S1 normal and S2 normal. No murmur heard.  Pulmonary:      Effort: Pulmonary effort is normal. No respiratory distress.      Breath sounds: Normal breath sounds. No wheezing or rales.   Abdominal:      General: Bowel sounds are normal.      Palpations: Abdomen is soft. There is no mass.      Tenderness: There is no abdominal tenderness.   Musculoskeletal:         General: Normal range of motion.      Cervical back: Normal range of motion and neck supple. No tenderness.   Lymphadenopathy:      Cervical: No cervical adenopathy.   Skin:     General: Skin is warm and dry.      Coloration: Skin is not pale.      Findings: Rash (Typical dengue rash, like a sunburn with gooseflesh) present.   Neurological:      Mental Status: He is alert and oriented to person, place, and time.      Cranial Nerves: No cranial nerve deficit.      Sensory: No sensory deficit.      Deep Tendon Reflexes: Reflexes are normal and symmetric.   Psychiatric:         Behavior: Behavior normal. Behavior is cooperative.         Thought Content: Thought content normal.         Judgment: Judgment normal.       John Kinney MD

## 2024-04-08 NOTE — ASSESSMENT & PLAN NOTE
This 58-year-old man that went down to [place] for vacation.  About 5 days ago he had a rash, like a sunburn but he was not in the sun.  Nausea, diarrhea (nonbloody), body aches and headaches.    It is completely consistent with dengue fever.    Is supportive care only, however he will watch for unusual bleeding, confusion or passing out.  If that happens I advised him to go to the ER.    I will check a CBC, CMP and a dengue PCL test.  Make sure that he is well-hydrated, Tylenol as needed for body aches.  He can take Benadryl for itching rash.

## 2024-04-15 ENCOUNTER — OFFICE VISIT (OUTPATIENT)
Dept: CARDIOLOGY CLINIC | Facility: MEDICAL CENTER | Age: 59
End: 2024-04-15
Payer: COMMERCIAL

## 2024-04-15 VITALS
BODY MASS INDEX: 24.44 KG/M2 | WEIGHT: 165 LBS | SYSTOLIC BLOOD PRESSURE: 118 MMHG | OXYGEN SATURATION: 98 % | HEIGHT: 69 IN | DIASTOLIC BLOOD PRESSURE: 80 MMHG | HEART RATE: 68 BPM

## 2024-04-15 DIAGNOSIS — E78.00 PURE HYPERCHOLESTEROLEMIA: ICD-10-CM

## 2024-04-15 DIAGNOSIS — R00.2 PALPITATIONS: ICD-10-CM

## 2024-04-15 DIAGNOSIS — I10 ESSENTIAL HYPERTENSION: Primary | ICD-10-CM

## 2024-04-15 PROCEDURE — 99214 OFFICE O/P EST MOD 30 MIN: CPT | Performed by: INTERNAL MEDICINE

## 2024-04-15 PROCEDURE — 93000 ELECTROCARDIOGRAM COMPLETE: CPT | Performed by: INTERNAL MEDICINE

## 2024-04-15 RX ORDER — LOSARTAN POTASSIUM 50 MG/1
50 TABLET ORAL
Qty: 90 TABLET | Refills: 3 | Status: SHIPPED | OUTPATIENT
Start: 2024-04-15

## 2024-04-15 NOTE — ASSESSMENT & PLAN NOTE
Lab Results   Component Value Date    LDLCALC 152 (H) 09/18/2023   On Zetia.  LDL has gone up.  Dietary modification discussed.  Historically statin intolerant.  We will plan calcium scoring at next visit.  Mediterranean style plant based diet and calorie restriction will be beneficial.    Avoid cholesterol rich foods such as egg yolk, red meat, high fat dairy and fried food.

## 2024-04-15 NOTE — ASSESSMENT & PLAN NOTE
Sporadic palpitations.  Zio-symptoms correlated with PVCs on Zio patch.  Zio patch personally reviewed and findings shared with the patient.  Mildly symptomatic.  Echocardiogram planned to evaluate heart function and rule out significant valvular heart disease.

## 2024-04-15 NOTE — PROGRESS NOTES
[facility] [place]  [address]  [address]  [facility] [address]  Phone#  [phone]  Fax#  [phone]  [facility]'s Cardiology Office Follow-up Visit             NAME: Sean Thomas  AGE: 58 y.o. SEX: male   : 1965   MRN: [id number]    DATE: 4/15/2024  TIME: 8:12 AM    Cardiology Problem list:  Post-COVID syndrome-COVID positive :  Echo-EF 60, normal systolic and diastolic function.  Valves normal.  Bronchial asthma  Chest pain:  Probably noncardiac.  ASCVD risk is 10%  Stress test 3/22- normal at 8 min  MVP by history  Palpitations  3/24- Zio: SR with PVCs and PACs. Correlated with symptoms.    Assessment and plan:    Essential hypertension  BP Readings from Last 3 Encounters:   04/15/24 118/80   24 122/78   24 149/84   Blood pressure recently was uncontrolled.  Placed on losartan with improved blood pressure.  Continue current medications.  Lifestyle modification.  Close blood pressure monitoring.        Palpitations  Sporadic palpitations.  Zio-symptoms correlated with PVCs on Zio patch.  Zio patch personally reviewed and findings shared with the patient.  Mildly symptomatic.  Echocardiogram planned to evaluate heart function and rule out significant valvular heart disease.      Pure hypercholesterolemia  Lab Results   Component Value Date    LDLCALC 152 (H) 2023   On Zetia.  LDL has gone up.  Dietary modification discussed.  Historically statin intolerant.  We will plan calcium scoring at next visit.  Mediterranean style plant based diet and calorie restriction will be beneficial.    Avoid cholesterol rich foods such as egg yolk, red meat, high fat dairy and fried food.      Continue present medications.  Recheck BMP with Dr. Kinney to ensure that the hyponatremia resolved after improvement in dengue fever.      Chief Complaint   Patient presents with    Follow-up     1 year f/up       HPI:    Sean Thomas is a 58 y.o.-year-old male  who presents to the cardiology clinic for follow up for the above-listed problems.   Patient is doing better from a cardiovascular standpoint.    Reports fatigue, recent fevers and rash related to dengue fever from recent travel.  He is still suffering from moderate myalgias and fatigue.  Blood pressure recently was elevated and palpitations had worsened.  He was placed on losartan for hypertension.  Blood pressure since then has improved.   No recent cardiac hospitalizations.    Current medications reviewed.    Reports compliance to medicines.  No side effects reported.  Denies chest pain on exertion.  Denies worsening shortness of breath.  Reports intermittent palpitations.      Past history, family history, social history, current medications, vital signs, recent lab and imaging studies and  prior cardiology studies reviewed independently on this visit.    Allergies   Allergen Reactions    Budesonide-Formoterol Fumarate Other (See Comments)     Hair loss    Rosuvastatin Myalgia       Current Outpatient Medications:     acetaminophen (TYLENOL) 325 mg tablet, Take 650 mg by mouth every 6 (six) hours as needed for mild pain, Disp: , Rfl:     diphenhydrAMINE (BENADRYL) 25 mg tablet, Take 25 mg by mouth as needed, Disp: , Rfl:     ezetimibe (ZETIA) 10 mg tablet, Take 1 tablet (10 mg total) by mouth daily, Disp: 90 tablet, Rfl: 1    levalbuterol (Xopenex HFA) 45 mcg/act inhaler, Inhale 1-2 puffs every 4 (four) hours as needed for wheezing, Disp: 45 g, Rfl: 3    losartan (COZAAR) 50 mg tablet, Take 1 tablet (50 mg total) by mouth daily at bedtime, Disp: 90 tablet, Rfl: 3    meloxicam (MOBIC) 15 mg tablet, Take 1 tablet (15 mg total) by mouth daily as needed for moderate pain, Disp: 60 tablet, Rfl: 0    multivitamin (THERAGRAN) TABS, Take 1 tablet by mouth daily, Disp: , Rfl:     tiZANidine (ZANAFLEX) 4 mg tablet, Take 1 tablet (4 mg total) by mouth every 8 (eight) hours as needed for muscle spasms, Disp: 21 tablet, Rfl:  1    Triamcinolone Acetonide (Nasacort Allergy) 55 MCG/ACT nasal spray, into each nostril, Disp: , Rfl:     triamterene-hydrochlorothiazide (MAXZIDE-25) 37.5-25 mg per tablet, TAKE 1 TABLET BY MOUTH DAILY (Patient taking differently: Take 1 tablet by mouth every morning), Disp: 90 tablet, Rfl: 1    fluticasone-umeclidinium-vilanterol (Trelegy Ellipta) 200-62.5-25 mcg/actuation AEPB inhaler, Inhale 1 puff daily Rinse mouth after use. (Patient taking differently: Inhale 1 puff every morning Rinse mouth after use.), Disp: 180 blister, Rfl: 3    Review of Systems   Constitutional:  Positive for fatigue and fever.   HENT: Negative.     Eyes: Negative.    Respiratory:  Negative for shortness of breath.    Cardiovascular:  Positive for palpitations. Negative for chest pain and leg swelling.   Gastrointestinal: Negative.    Endocrine: Negative.    Musculoskeletal:  Positive for arthralgias and myalgias.   Neurological:  Negative for syncope.   Hematological: Negative.  Does not bruise/bleed easily.   Psychiatric/Behavioral:  Negative for sleep disturbance.    All other systems reviewed and are negative.      Objective:     Vitals:    04/15/24 0745   BP: 118/80   Pulse: 68   SpO2: 98%     Wt Readings from Last 3 Encounters:   04/15/24 74.8 kg (165 lb)   04/08/24 75.8 kg (167 lb)   03/20/24 79.4 kg (175 lb)     Pulse Readings from Last 3 Encounters:   04/15/24 68   04/08/24 (!) 108   03/20/24 66     BP Readings from Last 3 Encounters:   04/15/24 118/80   04/08/24 122/78   03/20/24 149/84     Physical Exam  Vitals reviewed.   Constitutional:       General: He is not in acute distress.  HENT:      Head: Normocephalic.   Neck:      Vascular: No carotid bruit.   Cardiovascular:      Rate and Rhythm: Normal rate and regular rhythm.      Heart sounds: S1 normal and S2 normal. No murmur heard.  Pulmonary:      Breath sounds: No wheezing or rhonchi.   Musculoskeletal:      Right lower leg: No edema.      Left lower leg: No edema.  "  Skin:     General: Skin is warm.   Neurological:      Mental Status: He is alert. Mental status is at baseline.   Psychiatric:         Mood and Affect: Mood normal.         Pertinent Laboratory/Diagnostic Studies:    Laboratory studies reviewed personally by Avni Mccormack MD    BMP:   Lab Results   Component Value Date    SODIUM 131 (L) 04/08/2024    K 3.9 04/08/2024    CL 89 (L) 04/08/2024    CO2 29 04/08/2024    BUN 14 04/08/2024    CREATININE 1.26 04/08/2024    GLUC 111 04/08/2024    CALCIUM 10.1 04/08/2024     CBC:  Lab Results   Component Value Date    WBC 10.66 (H) 04/08/2024    HGB 18.3 (H) 04/08/2024    HCT 53.3 (H) 04/08/2024    MCV 95 04/08/2024     (L) 04/08/2024     Lipid Profile:   Lab Results   Component Value Date    HDL 50 09/18/2023     Lab Results   Component Value Date    LDLCALC 152 (H) 09/18/2023     Lab Results   Component Value Date    TRIG 217 (H) 09/18/2023      Other labs:  Lab Results   Component Value Date    QBS1KCBCSHNY 1.254 09/18/2023     Lab Results   Component Value Date     (H) 04/08/2024     (H) 04/08/2024       Imaging Studies:     Pertinent imaging studies and cardiac studies were independently reviewed on this visit and findings summarized.    Visit diagnoses  1. Essential hypertension  POCT ECG    Echo complete w/ contrast if indicated    losartan (COZAAR) 50 mg tablet      2. Palpitations  Echo complete w/ contrast if indicated      3. Pure hypercholesterolemia            Avni Mccormack MD, [facility]    Portions of the record may have been created with voice recognition software.  Occasional wrong word or \"sound alike\" substitutions may have occurred due to the inherent limitations of voice recognition software.  Read the chart carefully and recognize, using context, where substitutions have occurred. Please reach out to me directly for any clarifications.  "

## 2024-04-15 NOTE — PATIENT INSTRUCTIONS
"Echocardiogram planned to evaluate heart function and rule out significant valvular heart disease.  Continue present medications.  Recheck BMP with Dr. Kinney to ensure that the hyponatremia resolved after improvement in dengue fever.      Heart healthy nutrition:    Avoid cholesterol rich foods such as egg yolk, red meat, high fat dairy and fried food.        Mediterranean diet: A heart-healthy eating plan    What is the Mediterranean diet?  The Mediterranean diet is a way of eating based on the traditional cuisine of countries bordering the [place] Sea. While there is no single definition of the Mediterranean diet, it is typically high in vegetables, fruits, whole grains, beans, nut and seeds, and olive oil.    The main components of Mediterranean diet include:    Daily consumption of vegetables, fruits, whole grains and healthy fats.  Avoid cholesterol rich foods such as egg yolks, red meat, high fat dairy and fried food.  Limited intake of animal-based food.  Other important elements of the Mediterranean diet are sharing meals with family and friends    Plant based, not meat based  The foundation of the Mediterranean diet is vegetables, fruits, herbs, nuts, beans and whole grains. Meals are built around these plant-based foods.     Healthy fats  Healthy fats are a mainstay of the Mediterranean diet. They're eaten instead of less healthy fats, such as saturated and trans fats, which contribute to heart disease.    Olive oil is the primary source of added fat in the Mediterranean diet. Olive oil provides monounsaturated fat, which has been found to lower total cholesterol and low-density lipoprotein (LDL or \"bad\") cholesterol levels. Nuts and seeds also contain monounsaturated fat.    Fish are also important in the Mediterranean diet. Fatty fish -- such as mackerel, herring, sardines, albacore tuna, salmon and lake trout -- are rich in omega-3 fatty acids, a type of polyunsaturated fat that may reduce " inflammation in the body. Omega-3 fatty acids also help decrease triglycerides, reduce blood clotting, and decrease the risk of stroke and heart failure.    Eating the Mediterranean way  Interested in trying the Mediterranean diet? These tips will help you get started:    Eat more fruits and vegetables. Aim for 7 to 10 servings a day of fruit and vegetables.  Opt for whole grains. Switch to whole-grain bread, cereal and pasta. [place] with other whole grains.  Use healthy fats. Try olive oil as a replacement for butter when cooking. Instead of putting butter or margarine on bread, try dipping it in flavored olive oil.  Eat more seafood. Eat fish twice a week. Fresh or water-packed tuna, salmon, trout, mackerel and herring are healthy choices. Grilled fish tastes good and requires little cleanup. Avoid deep-fried fish.  Avoid red meat.   Spice it up. Herbs and spices boost flavor and lessen the need for salt.    Source: https://www.[facility].org/healthy-lifestyle/nutrition-and-healthy-eating/in-depth/mediterranean-diet/art-01077358

## 2024-04-15 NOTE — ASSESSMENT & PLAN NOTE
BP Readings from Last 3 Encounters:   04/15/24 118/80   04/08/24 122/78   03/20/24 149/84   Blood pressure recently was uncontrolled.  Placed on losartan with improved blood pressure.  Continue current medications.  Lifestyle modification.  Close blood pressure monitoring.

## 2024-04-16 LAB — MISCELLANEOUS LAB TEST RESULT: NORMAL

## 2024-04-25 ENCOUNTER — HOSPITAL ENCOUNTER (OUTPATIENT)
Dept: NON INVASIVE DIAGNOSTICS | Facility: MEDICAL CENTER | Age: 59
Discharge: HOME | End: 2024-04-25
Payer: COMMERCIAL

## 2024-04-25 VITALS
BODY MASS INDEX: 24.44 KG/M2 | HEART RATE: 68 BPM | SYSTOLIC BLOOD PRESSURE: 118 MMHG | WEIGHT: 165 LBS | HEIGHT: 69 IN | DIASTOLIC BLOOD PRESSURE: 80 MMHG

## 2024-04-25 DIAGNOSIS — R00.2 PALPITATIONS: ICD-10-CM

## 2024-04-25 DIAGNOSIS — I10 ESSENTIAL HYPERTENSION: ICD-10-CM

## 2024-04-25 LAB
AORTIC ROOT: 2.9 CM
APICAL FOUR CHAMBER EJECTION FRACTION: 59 %
ASCENDING AORTA: 3.2 CM
BSA FOR ECHO PROCEDURE: 1.9 M2
E WAVE DECELERATION TIME: 224 MS
E/A RATIO: 0.76
FRACTIONAL SHORTENING: 40 (ref 28–44)
INTERVENTRICULAR SEPTUM IN DIASTOLE (PARASTERNAL SHORT AXIS VIEW): 0.8 CM
INTERVENTRICULAR SEPTUM: 0.8 CM (ref 0.6–1.1)
LAAS-AP2: 14.9 CM2
LAAS-AP4: 12.7 CM2
LEFT ATRIUM SIZE: 3.3 CM
LEFT ATRIUM VOLUME (MOD BIPLANE): 35 ML
LEFT ATRIUM VOLUME INDEX (MOD BIPLANE): 18.4 ML/M2
LEFT INTERNAL DIMENSION IN SYSTOLE: 2.6 CM (ref 2.1–4)
LEFT VENTRICULAR INTERNAL DIMENSION IN DIASTOLE: 4.3 CM (ref 3.5–6)
LEFT VENTRICULAR POSTERIOR WALL IN END DIASTOLE: 1 CM
LEFT VENTRICULAR STROKE VOLUME: 56 ML
LVSV (TEICH): 56 ML
MV E'TISSUE VEL-SEP: 8 CM/S
MV PEAK A VEL: 0.89 M/S
MV PEAK E VEL: 68 CM/S
MV STENOSIS PRESSURE HALF TIME: 65 MS
MV VALVE AREA P 1/2 METHOD: 3.38
RA PRESSURE ESTIMATED: 8 MMHG
RIGHT ATRIUM AREA SYSTOLE A4C: 11.1 CM2
RIGHT VENTRICLE ID DIMENSION: 2.9 CM
RV PSP: 31 MMHG
SL CV LEFT ATRIUM LENGTH A2C: 5.1 CM
SL CV LV EF: 60
SL CV PED ECHO LEFT VENTRICLE DIASTOLIC VOLUME (MOD BIPLANE) 2D: 82 ML
SL CV PED ECHO LEFT VENTRICLE SYSTOLIC VOLUME (MOD BIPLANE) 2D: 25 ML
TR MAX PG: 23 MMHG
TR PEAK VELOCITY: 2.4 M/S
TRICUSPID ANNULAR PLANE SYSTOLIC EXCURSION: 2.1 CM
TRICUSPID VALVE PEAK REGURGITATION VELOCITY: 2.4 M/S

## 2024-04-25 PROCEDURE — 93306 TTE W/DOPPLER COMPLETE: CPT

## 2024-04-25 PROCEDURE — 93306 TTE W/DOPPLER COMPLETE: CPT | Performed by: INTERNAL MEDICINE

## 2024-04-25 NOTE — RESULT ENCOUNTER NOTE
ECHO reviewed:   Normal pumping strength of the heart muscle.  Valves: No serious abnormalities seen.  Overall these results are reassuring.  Please call with test results.

## 2024-05-02 DIAGNOSIS — J45.40 MODERATE PERSISTENT ASTHMA WITHOUT COMPLICATION: Primary | ICD-10-CM

## 2024-05-02 RX ORDER — BUDESONIDE, GLYCOPYRROLATE, AND FORMOTEROL FUMARATE 160; 9; 4.8 UG/1; UG/1; UG/1
2 AEROSOL, METERED RESPIRATORY (INHALATION) 2 TIMES DAILY
Qty: 10.7 G | Refills: 8 | Status: SHIPPED | OUTPATIENT
Start: 2024-05-02

## 2024-05-20 ENCOUNTER — NEW PATIENT (OUTPATIENT)
Dept: URBAN - METROPOLITAN AREA CLINIC 6 | Facility: CLINIC | Age: 59
End: 2024-05-20

## 2024-05-20 DIAGNOSIS — R51.9: ICD-10-CM

## 2024-05-20 DIAGNOSIS — G45.3: ICD-10-CM

## 2024-05-20 PROCEDURE — 92020 GONIOSCOPY: CPT

## 2024-05-20 PROCEDURE — 92004 COMPRE OPH EXAM NEW PT 1/>: CPT

## 2024-05-20 ASSESSMENT — TONOMETRY
OS_IOP_MMHG: 12
OD_IOP_MMHG: 13

## 2024-05-20 ASSESSMENT — VISUAL ACUITY
OD_CC: 20/20
OU_CC: J1+
OS_CC: 20/20

## 2024-06-21 ENCOUNTER — OFFICE VISIT (OUTPATIENT)
Dept: PULMONOLOGY | Facility: CLINIC | Age: 59
End: 2024-06-21
Payer: COMMERCIAL

## 2024-06-21 ENCOUNTER — OFFICE VISIT (OUTPATIENT)
Dept: OBGYN CLINIC | Facility: CLINIC | Age: 59
End: 2024-06-21
Payer: COMMERCIAL

## 2024-06-21 VITALS — HEIGHT: 69 IN | BODY MASS INDEX: 24.44 KG/M2 | WEIGHT: 165 LBS

## 2024-06-21 VITALS
WEIGHT: 173 LBS | TEMPERATURE: 98.9 F | DIASTOLIC BLOOD PRESSURE: 82 MMHG | HEART RATE: 65 BPM | BODY MASS INDEX: 25.62 KG/M2 | HEIGHT: 69 IN | OXYGEN SATURATION: 98 % | SYSTOLIC BLOOD PRESSURE: 126 MMHG

## 2024-06-21 DIAGNOSIS — J45.40 MODERATE PERSISTENT ASTHMA WITHOUT COMPLICATION: ICD-10-CM

## 2024-06-21 DIAGNOSIS — G47.33 OSA (OBSTRUCTIVE SLEEP APNEA): Primary | ICD-10-CM

## 2024-06-21 DIAGNOSIS — S83.241S OTHER TEAR OF MEDIAL MENISCUS OF RIGHT KNEE AS CURRENT INJURY, SEQUELA: ICD-10-CM

## 2024-06-21 DIAGNOSIS — M17.11 PRIMARY OSTEOARTHRITIS OF RIGHT KNEE: Primary | ICD-10-CM

## 2024-06-21 DIAGNOSIS — Z98.890 S/P MEDIAL MENISCECTOMY OF RIGHT KNEE: ICD-10-CM

## 2024-06-21 PROCEDURE — 20610 DRAIN/INJ JOINT/BURSA W/O US: CPT | Performed by: STUDENT IN AN ORGANIZED HEALTH CARE EDUCATION/TRAINING PROGRAM

## 2024-06-21 PROCEDURE — 99213 OFFICE O/P EST LOW 20 MIN: CPT | Performed by: STUDENT IN AN ORGANIZED HEALTH CARE EDUCATION/TRAINING PROGRAM

## 2024-06-21 PROCEDURE — 99214 OFFICE O/P EST MOD 30 MIN: CPT | Performed by: INTERNAL MEDICINE

## 2024-06-21 RX ORDER — TRIAMCINOLONE ACETONIDE 40 MG/ML
40 INJECTION, SUSPENSION INTRA-ARTICULAR; INTRAMUSCULAR
Status: COMPLETED | OUTPATIENT
Start: 2024-06-21 | End: 2024-06-21

## 2024-06-21 RX ORDER — BUPIVACAINE HYDROCHLORIDE 2.5 MG/ML
4 INJECTION, SOLUTION INFILTRATION; PERINEURAL
Status: COMPLETED | OUTPATIENT
Start: 2024-06-21 | End: 2024-06-21

## 2024-06-21 RX ADMIN — TRIAMCINOLONE ACETONIDE 40 MG: 40 INJECTION, SUSPENSION INTRA-ARTICULAR; INTRAMUSCULAR at 09:45

## 2024-06-21 RX ADMIN — BUPIVACAINE HYDROCHLORIDE 4 ML: 2.5 INJECTION, SOLUTION INFILTRATION; PERINEURAL at 09:45

## 2024-06-21 NOTE — PROGRESS NOTES
Pulmonary Follow Up Note   Sean Thomas 58 y.o. male MRN: [id number]  6/26/2024    Assessment:    ALEX (obstructive sleep apnea)  Crescencio fell off the wagon a bit after some recent illnesses but he is now back to using the device.  He has issues with feeling maxillary pressure and pain after using his device; I advised him to try loosening the straps slightly to treat this.      Moderate persistent asthma without complication  His symptoms are generally better.  With severe amounts of heat and humidity levels lately, he has had to use his rescue medications a lot lately, but he is now back to normal.  Will continue Breztri and xopenex.    Plan:    Diagnoses and all orders for this visit:    ALEX (obstructive sleep apnea)    Moderate persistent asthma without complication    Return in about 6 months (around 12/21/2024).    History of Present Illness   HPI:  Sean Thomas is a 58 y.o. male who presents for routine follow up.  He has been better overall about using his Breztri but has still struggled with remembering to do it twice a day.  Outside of recently, he has not had a lot of rescue inhaler usage, but with high humidity and high temperature days this past week he has had to use his xopenex relatively often.  Prior to that he doesn't think he needed it for the past 2 months.    His CPAP has been really hurting his teeth in the morning.  He had intermittent compliance lately due to traveling outside the country and having difficulty acquiring distilled water.  He does think his straps may be a bit too tight but he does not have signs of sinus disease.    Review of Systems   Constitutional:  Negative for appetite change and fever.   HENT:  Positive for sore throat. Negative for ear pain, postnasal drip, rhinorrhea, sneezing and trouble swallowing.    Cardiovascular:  Negative for chest pain.   Musculoskeletal:  Negative for myalgias.   Neurological:  Positive for headaches.     Historical Information   Past  Medical History:   Diagnosis Date   • Allergic    • Anxiety    • Asthma    • CPAP (continuous positive airway pressure) dependence    • Depression    • Diverticulitis of colon    • Dyspnea on exertion 01/20/2022   • Heart murmur    • Hyperlipidemia    • Hypertension    • Pneumonia 03/2016   • Precordial pain 01/18/2022   • Sleep apnea      Past Surgical History:   Procedure Laterality Date   • APPENDECTOMY     • COLONOSCOPY     • WA ARTHRS KNE SURG W/MENISCECTOMY MED/LAT W/SHVG Right 2/12/2024    Procedure: KNEE ARTHROSCOPIC PARTIAL MENISCECTOMY MEDIAL;  Surgeon: oMrgan Cam DO;  Location: [facility] MAIN OR;  Service: Orthopedics   • VASECTOMY      VasDeferens   • WISDOM TOOTH EXTRACTION       Family History   Problem Relation Age of Onset   • Anxiety disorder Mother    • Hypertension Father    • Hyperlipidemia Father    • No Known Problems Sister    • No Known Problems Sister    • No Known Problems Son    • No Known Problems Son    • No Known Problems Daughter    • Anxiety disorder Family    • Heart disease Family    • Stroke Family        Meds/Allergies     Current Outpatient Medications:   •  Budeson-Glycopyrrol-Formoterol (Breztri Aerosphere) 160-9-4.8 MCG/ACT AERO, Inhale 2 puffs 2 (two) times a day Rinse mouth after use., Disp: 10.7 g, Rfl: 8  •  diphenhydrAMINE (BENADRYL) 25 mg tablet, Take 25 mg by mouth as needed, Disp: , Rfl:   •  ezetimibe (ZETIA) 10 mg tablet, Take 1 tablet (10 mg total) by mouth daily, Disp: 90 tablet, Rfl: 1  •  levalbuterol (Xopenex HFA) 45 mcg/act inhaler, Inhale 1-2 puffs every 4 (four) hours as needed for wheezing, Disp: 45 g, Rfl: 3  •  losartan (COZAAR) 50 mg tablet, Take 1 tablet (50 mg total) by mouth daily at bedtime, Disp: 90 tablet, Rfl: 3  •  meloxicam (MOBIC) 15 mg tablet, Take 1 tablet (15 mg total) by mouth daily as needed for moderate pain, Disp: 60 tablet, Rfl: 0  •  multivitamin (THERAGRAN) TABS, Take 1 tablet by mouth daily, Disp: , Rfl:   •  tiZANidine  "(ZANAFLEX) 4 mg tablet, Take 1 tablet (4 mg total) by mouth every 8 (eight) hours as needed for muscle spasms, Disp: 21 tablet, Rfl: 1  •  Triamcinolone Acetonide (Nasacort Allergy) 55 MCG/ACT nasal spray, into each nostril, Disp: , Rfl:   •  triamterene-hydrochlorothiazide (MAXZIDE-25) 37.5-25 mg per tablet, TAKE 1 TABLET BY MOUTH DAILY, Disp: 90 tablet, Rfl: 1  Allergies   Allergen Reactions   • Budesonide-Formoterol Fumarate Other (See Comments)     Hair loss   • Rosuvastatin Myalgia       Vitals: Blood pressure 126/82, pulse 65, temperature 98.9 °F (37.2 °C), temperature source Tympanic, height 5' 9\" (1.753 m), weight 78.5 kg (173 lb), SpO2 98%. Body mass index is 25.55 kg/m². Oxygen Therapy  SpO2: 98 %    Physical Exam  Physical Exam  Vitals reviewed.   Constitutional:       General: He is not in acute distress.     Appearance: Normal appearance. He is well-developed. He is not ill-appearing.   HENT:      Head: Normocephalic and atraumatic.   Eyes:      General: No scleral icterus.     Conjunctiva/sclera: Conjunctivae normal.   Neck:      Thyroid: No thyromegaly.      Vascular: No JVD.   Cardiovascular:      Rate and Rhythm: Normal rate and regular rhythm.      Heart sounds: Normal heart sounds. No murmur heard.     No friction rub. No gallop.   Pulmonary:      Effort: Pulmonary effort is normal. No respiratory distress.      Breath sounds: Normal breath sounds. No wheezing or rales.   Musculoskeletal:      Cervical back: Neck supple.      Right lower leg: No edema.      Left lower leg: No edema.   Skin:     General: Skin is warm and dry.      Findings: No rash.   Neurological:      General: No focal deficit present.      Mental Status: He is alert and oriented to person, place, and time. Mental status is at baseline.   Psychiatric:         Mood and Affect: Mood normal.         Behavior: Behavior normal.         Labs: I have personally reviewed pertinent lab results.  Lab Results   Component Value Date    WBC " 10.66 (H) 04/08/2024    HGB 18.3 (H) 04/08/2024    HCT 53.3 (H) 04/08/2024    MCV 95 04/08/2024     (L) 04/08/2024     Lab Results   Component Value Date    CALCIUM 10.1 04/08/2024    K 3.9 04/08/2024    CO2 29 04/08/2024    CL 89 (L) 04/08/2024    BUN 14 04/08/2024    CREATININE 1.26 04/08/2024     Lab Results   Component Value Date    IGE 38.3 05/09/2023     Lab Results   Component Value Date     (H) 04/08/2024     (H) 04/08/2024    ALKPHOS 65 04/08/2024       Imaging and other studies: I have personally reviewed relevant films in PACS.    EKG, Pathology, and Other Studies: I have personally reviewed relevant reports in Epic.    Crispin Ratliff M.D.  [facility]

## 2024-06-21 NOTE — PROGRESS NOTES
Ortho Sports Medicine Knee Follow Up Visit     Assesment:     58 y.o. male 4 months status post right knee arthroscopy with partial medial meniscectomy with lateral knee pain.    Plan:  The patient's diagnosis and treatment were discussed at length today. We discussed no treatment, non-operative treatment, and operative treatment.    Crescencio returns for follow-up regarding his right knee.  He does endorse some pain that returned over the last several weeks anteriorly and medially.  He describes previously doing well until flareup over the last several weeks.  Would like to resume taking some meloxicam daily for the next 7 days and we discussed possibility of corticosteroid injection for immediate symptomatic relief, to which he was interested.  Corticosteroid injection was provided and patient should continue with a home directed exercise program.  He can follow-up with me in approximately 6 to 8 weeks if he has ongoing discomfort.    Conservative treatment:    Ice to knee for 20 minutes at least 1-2 times daily.  CSI right knee performed. Patient tolerated the procedure well.  Recommended Meloxicam for pain. Patient reports he still has medication left over from previous appointment.  OTC knee sleeve as needed for pain.    Imaging:    No new imaging performed today.      Injection:    The risks and benefits of the injection (which include but are not limited to: infection, bleeding,damage to nerve/artery, need for further intervention), as well as the risks and benefits of all alternative treatments were explained and understood.  The patient elected to proceed with injection.  The procedure was done with aseptic technique, and the patient tolerated the procedure well with no complications.  A corticosteroid injection was performed.  The patient should take 1-2 days off of activity, with gradual return to activity as tolerated.  Ice to the knee 1-2 times daily for 20 minutes, for next 24-48 hrs.    A conversation was  held prior to injection as hair loss with inhaler medication was noted as an allergen. Patient denies any previous reactions to prior CSI in November 2023. Patient wishes to proceed with CSI.    Large joint arthrocentesis: R knee  Universal Protocol:  Consent: Verbal consent obtained.  Risks and benefits: risks, benefits and alternatives were discussed  Consent given by: patient  Timeout called at: 6/21/2024 9:50 AM.  Patient understanding: patient states understanding of the procedure being performed  Patient identity confirmed: verbally with patient  Supporting Documentation  Indications: pain   Procedure Details  Location: knee - R knee  Needle size: 22 G  Ultrasound guidance: no  Approach: anterolateral  Medications administered: 40 mg triamcinolone acetonide 40 mg/mL; 4 mL bupivacaine 0.25 %    Patient tolerance: patient tolerated the procedure well with no immediate complications          Surgery:     No surgery is recommended at this point, continue with conservative treatment plan as noted.    Follow up:    No follow-ups on file.        Chief Complaint   Patient presents with    Right Knee - Post-op     Sx: 02/12/24       History of Present Illness:    The patient is returns for follow up 4 months status post right knee arthroscopy with partial medial meniscectomy.  Since the prior visit, He reports he had signficiant relief of his pain until one month ago. He reports global knee pain that is worse laterally and over the medial portal site. This pain is a 7/10. Pain worsens with sleeping and walking. We reports catching in the morning when extending his knee. He takes NSAIDs for pain as needed. He is going for leisurely walks but has not attended formal PT. He denies any distal paresthesias. He reports a history of Dengue fever from trip to [facility].      Past Medical, Social and Family History:  Past Medical History:   Diagnosis Date    Allergic     Anxiety     Asthma     CPAP (continuous positive airway  pressure) dependence     Depression     Diverticulitis of colon     Dyspnea on exertion 01/20/2022    Heart murmur     Hyperlipidemia     Hypertension     Pneumonia 03/2016    Precordial pain 01/18/2022    Sleep apnea      Past Surgical History:   Procedure Laterality Date    APPENDECTOMY      COLONOSCOPY      MN ARTHRS KNE SURG W/MENISCECTOMY MED/LAT W/SHVG Right 2/12/2024    Procedure: KNEE ARTHROSCOPIC PARTIAL MENISCECTOMY MEDIAL;  Surgeon: Morgan Cam DO;  Location: [facility] MAIN OR;  Service: Orthopedics    VASECTOMY      VasDeferens    WISDOM TOOTH EXTRACTION       Allergies   Allergen Reactions    Budesonide-Formoterol Fumarate Other (See Comments)     Hair loss    Rosuvastatin Myalgia     Current Outpatient Medications on File Prior to Visit   Medication Sig Dispense Refill    Budeson-Glycopyrrol-Formoterol (Breztri Aerosphere) 160-9-4.8 MCG/ACT AERO Inhale 2 puffs 2 (two) times a day Rinse mouth after use. 10.7 g 8    diphenhydrAMINE (BENADRYL) 25 mg tablet Take 25 mg by mouth as needed      ezetimibe (ZETIA) 10 mg tablet Take 1 tablet (10 mg total) by mouth daily 90 tablet 1    levalbuterol (Xopenex HFA) 45 mcg/act inhaler Inhale 1-2 puffs every 4 (four) hours as needed for wheezing 45 g 3    losartan (COZAAR) 50 mg tablet Take 1 tablet (50 mg total) by mouth daily at bedtime 90 tablet 3    meloxicam (MOBIC) 15 mg tablet Take 1 tablet (15 mg total) by mouth daily as needed for moderate pain 60 tablet 0    multivitamin (THERAGRAN) TABS Take 1 tablet by mouth daily      tiZANidine (ZANAFLEX) 4 mg tablet Take 1 tablet (4 mg total) by mouth every 8 (eight) hours as needed for muscle spasms 21 tablet 1    Triamcinolone Acetonide (Nasacort Allergy) 55 MCG/ACT nasal spray into each nostril      triamterene-hydrochlorothiazide (MAXZIDE-25) 37.5-25 mg per tablet TAKE 1 TABLET BY MOUTH DAILY (Patient taking differently: Take 1 tablet by mouth every morning) 90 tablet 1    acetaminophen (TYLENOL) 325 mg tablet  "Take 650 mg by mouth every 6 (six) hours as needed for mild pain       No current facility-administered medications on file prior to visit.     Social History     Socioeconomic History    Marital status: /Civil Union     Spouse name: Not on file    Number of children: Not on file    Years of education: Not on file    Highest education level: Not on file   Occupational History    Not on file   Tobacco Use    Smoking status: Never    Smokeless tobacco: Never   Vaping Use    Vaping status: Never Used   Substance and Sexual Activity    Alcohol use: Yes     Alcohol/week: 7.0 standard drinks of alcohol     Types: 7 Glasses of wine per week     Comment: one glass wine per day    Drug use: Never    Sexual activity: Yes     Partners: Female     Birth control/protection: Male Sterilization   Other Topics Concern    Not on file   Social History Narrative    Caffeine Use     Social Determinants of Health     Financial Resource Strain: Not on file   Food Insecurity: Not on file   Transportation Needs: Not on file   Physical Activity: Not on file   Stress: Not on file   Social Connections: Not on file   Intimate Partner Violence: Not on file   Housing Stability: Not on file         I have reviewed the past medical, surgical, social and family history, medications and allergies as documented in the EMR.    Review of systems: ROS is negative other than that noted in the HPI.  Constitutional: Negative for fatigue and fever.      Physical Exam:    Height 5' 9\" (1.753 m), weight 74.8 kg (165 lb).    General/Constitutional: NAD, well developed, well nourished  HENT: Normocephalic, atraumatic  CV: Intact distal pulses, regular rate  Resp: No respiratory distress or labored breathing  Lymphatic: No lymphadenopathy palpated  Neuro: Alert and Oriented x 3, no focal deficits  Psych: Normal mood, normal affect, normal judgement, normal behavior  Skin: Warm, dry, no rashes, no erythema      Knee Exam (focused):  Visual inspection of " the RIGHT knee demonstrates normal contour without atrophy.   Healed previous incisions   There is no significant erythema or edema.    No significant joint effusion   Range of motion is full from 0-130 degrees of flexion   Able to straight leg raise   Medial portal site tender to palpation  Negative medial joint line tenderness, Positive lateral joint line tenderness  Negative medial Yoan's, Negative lateral Yoan's  1a Lachman exam, stable posterior drawer  Stable dial test  Stable to varus and valgus stress at both 0 and 30°  Patella tracks normally.  No J sign.  No apprehension.  Translation is approximately 2 quadrants and is equal to the contralateral side  Patellar eversion is similar to the contralateral side    Examination of the patient's ipsilateral hip demonstrates full painless range of motion.  No crepitus.           LE NV Exam: +2 DP/PT pulses bilaterally  Sensation intact to light touch L2-S1 bilaterally    No calf tenderness to palpation bilaterally      Knee Imaging    No new imaging performed today.      Scribe Attestation      I,:   am acting as a scribe while in the presence of the attending physician.:       I,:   personally performed the services described in this documentation    as scribed in my presence.:

## 2024-06-22 NOTE — ASSESSMENT & PLAN NOTE
Crescencio fell off the wagon a bit after some recent illnesses but he is now back to using the device.  He has issues with feeling maxillary pressure and pain after using his device; I advised him to try loosening the straps slightly to treat this.

## 2024-06-24 DIAGNOSIS — S83.241A OTHER TEAR OF MEDIAL MENISCUS, CURRENT INJURY, RIGHT KNEE, INITIAL ENCOUNTER: ICD-10-CM

## 2024-06-24 RX ORDER — MELOXICAM 15 MG/1
15 TABLET ORAL DAILY PRN
Qty: 60 TABLET | Refills: 0 | Status: SHIPPED | OUTPATIENT
Start: 2024-06-24

## 2024-06-26 DIAGNOSIS — I10 ESSENTIAL HYPERTENSION: ICD-10-CM

## 2024-06-26 RX ORDER — TRIAMTERENE AND HYDROCHLOROTHIAZIDE 37.5; 25 MG/1; MG/1
1 TABLET ORAL DAILY
Qty: 90 TABLET | Refills: 1 | Status: SHIPPED | OUTPATIENT
Start: 2024-06-26

## 2024-06-26 NOTE — ASSESSMENT & PLAN NOTE
His symptoms are generally better.  With severe amounts of heat and humidity levels lately, he has had to use his rescue medications a lot lately, but he is now back to normal.  Will continue Breztri and xopenex.

## 2024-09-05 DIAGNOSIS — E78.00 PURE HYPERCHOLESTEROLEMIA: ICD-10-CM

## 2024-09-05 NOTE — TELEPHONE ENCOUNTER
Medication: ezetimibe (ZETIA) 10 mg tablet     Dose/Frequency: Take 1 tablet (10 mg total) by mouth daily,     Quantity: 90    Pharmacy: [facility] ([facility]) - [place], [place]  [address]     Office:   [x] PCP/Provider -   [] Speciality/Provider -     Does the patient have enough for 3 days?   [x] Yes   [] No - Send as HP to POD

## 2024-09-06 RX ORDER — EZETIMIBE 10 MG/1
10 TABLET ORAL DAILY
Qty: 90 TABLET | Refills: 1 | Status: SHIPPED | OUTPATIENT
Start: 2024-09-06

## 2024-10-29 DIAGNOSIS — I10 ESSENTIAL HYPERTENSION: ICD-10-CM

## 2024-10-29 DIAGNOSIS — Z12.5 SCREENING FOR PROSTATE CANCER: ICD-10-CM

## 2024-10-29 DIAGNOSIS — E78.00 PURE HYPERCHOLESTEROLEMIA: Primary | ICD-10-CM

## 2024-10-29 DIAGNOSIS — Z13.0 SCREENING FOR DEFICIENCY ANEMIA: ICD-10-CM

## 2024-10-29 DIAGNOSIS — Z13.29 THYROID DISORDER SCREEN: ICD-10-CM

## 2024-11-04 ENCOUNTER — APPOINTMENT (OUTPATIENT)
Dept: LAB | Facility: MEDICAL CENTER | Age: 59
End: 2024-11-04
Payer: COMMERCIAL

## 2024-11-04 DIAGNOSIS — I10 ESSENTIAL HYPERTENSION: ICD-10-CM

## 2024-11-04 DIAGNOSIS — Z13.29 THYROID DISORDER SCREEN: ICD-10-CM

## 2024-11-04 DIAGNOSIS — Z13.0 SCREENING FOR DEFICIENCY ANEMIA: ICD-10-CM

## 2024-11-04 DIAGNOSIS — Z12.5 SCREENING FOR PROSTATE CANCER: ICD-10-CM

## 2024-11-04 DIAGNOSIS — E78.00 PURE HYPERCHOLESTEROLEMIA: ICD-10-CM

## 2024-11-04 LAB
ALBUMIN SERPL BCG-MCNC: 4.1 G/DL (ref 3.5–5)
ALP SERPL-CCNC: 67 U/L (ref 34–104)
ALT SERPL W P-5'-P-CCNC: 16 U/L (ref 7–52)
ANION GAP SERPL CALCULATED.3IONS-SCNC: 9 MMOL/L (ref 4–13)
AST SERPL W P-5'-P-CCNC: 19 U/L (ref 13–39)
BASOPHILS # BLD AUTO: 0.07 THOUSANDS/ΜL (ref 0–0.1)
BASOPHILS NFR BLD AUTO: 1 % (ref 0–1)
BILIRUB SERPL-MCNC: 0.79 MG/DL (ref 0.2–1)
BUN SERPL-MCNC: 16 MG/DL (ref 5–25)
CALCIUM SERPL-MCNC: 9.3 MG/DL (ref 8.4–10.2)
CHLORIDE SERPL-SCNC: 101 MMOL/L (ref 96–108)
CHOLEST SERPL-MCNC: 203 MG/DL
CO2 SERPL-SCNC: 30 MMOL/L (ref 21–32)
CREAT SERPL-MCNC: 0.98 MG/DL (ref 0.6–1.3)
EOSINOPHIL # BLD AUTO: 0.1 THOUSAND/ΜL (ref 0–0.61)
EOSINOPHIL NFR BLD AUTO: 1 % (ref 0–6)
ERYTHROCYTE [DISTWIDTH] IN BLOOD BY AUTOMATED COUNT: 12.4 % (ref 11.6–15.1)
GFR SERPL CREATININE-BSD FRML MDRD: 84 ML/MIN/1.73SQ M
GLUCOSE P FAST SERPL-MCNC: 96 MG/DL (ref 65–99)
HCT VFR BLD AUTO: 49.5 % (ref 36.5–49.3)
HDLC SERPL-MCNC: 48 MG/DL
HGB BLD-MCNC: 16.4 G/DL (ref 12–17)
IMM GRANULOCYTES # BLD AUTO: 0.04 THOUSAND/UL (ref 0–0.2)
IMM GRANULOCYTES NFR BLD AUTO: 0 % (ref 0–2)
LDLC SERPL CALC-MCNC: 124 MG/DL (ref 0–100)
LYMPHOCYTES # BLD AUTO: 1.94 THOUSANDS/ΜL (ref 0.6–4.47)
LYMPHOCYTES NFR BLD AUTO: 17 % (ref 14–44)
MCH RBC QN AUTO: 33.5 PG (ref 26.8–34.3)
MCHC RBC AUTO-ENTMCNC: 33.1 G/DL (ref 31.4–37.4)
MCV RBC AUTO: 101 FL (ref 82–98)
MONOCYTES # BLD AUTO: 1.02 THOUSAND/ΜL (ref 0.17–1.22)
MONOCYTES NFR BLD AUTO: 9 % (ref 4–12)
NEUTROPHILS # BLD AUTO: 8.25 THOUSANDS/ΜL (ref 1.85–7.62)
NEUTS SEG NFR BLD AUTO: 72 % (ref 43–75)
NRBC BLD AUTO-RTO: 0 /100 WBCS
PLATELET # BLD AUTO: 219 THOUSANDS/UL (ref 149–390)
PMV BLD AUTO: 11.1 FL (ref 8.9–12.7)
POTASSIUM SERPL-SCNC: 4.2 MMOL/L (ref 3.5–5.3)
PROT SERPL-MCNC: 7.2 G/DL (ref 6.4–8.4)
PSA SERPL-MCNC: 1.02 NG/ML (ref 0–4)
RBC # BLD AUTO: 4.9 MILLION/UL (ref 3.88–5.62)
SODIUM SERPL-SCNC: 140 MMOL/L (ref 135–147)
TRIGL SERPL-MCNC: 154 MG/DL
TSH SERPL DL<=0.05 MIU/L-ACNC: 1.32 UIU/ML (ref 0.45–4.5)
WBC # BLD AUTO: 11.42 THOUSAND/UL (ref 4.31–10.16)

## 2024-11-04 PROCEDURE — 80061 LIPID PANEL: CPT

## 2024-11-04 PROCEDURE — 36415 COLL VENOUS BLD VENIPUNCTURE: CPT

## 2024-11-04 PROCEDURE — 85025 COMPLETE CBC W/AUTO DIFF WBC: CPT

## 2024-11-04 PROCEDURE — 80053 COMPREHEN METABOLIC PANEL: CPT

## 2024-11-04 PROCEDURE — 84443 ASSAY THYROID STIM HORMONE: CPT

## 2024-11-04 PROCEDURE — G0103 PSA SCREENING: HCPCS

## 2024-11-07 ENCOUNTER — OFFICE VISIT (OUTPATIENT)
Dept: FAMILY MEDICINE CLINIC | Facility: MEDICAL CENTER | Age: 59
End: 2024-11-07
Payer: COMMERCIAL

## 2024-11-07 VITALS
TEMPERATURE: 100 F | SYSTOLIC BLOOD PRESSURE: 110 MMHG | RESPIRATION RATE: 18 BRPM | DIASTOLIC BLOOD PRESSURE: 76 MMHG | WEIGHT: 170.4 LBS | HEART RATE: 77 BPM | HEIGHT: 69 IN | OXYGEN SATURATION: 97 % | BODY MASS INDEX: 25.24 KG/M2

## 2024-11-07 DIAGNOSIS — Z12.11 SCREEN FOR COLON CANCER: ICD-10-CM

## 2024-11-07 DIAGNOSIS — K04.7 DENTAL INFECTION: ICD-10-CM

## 2024-11-07 DIAGNOSIS — Z71.2 ENCOUNTER TO DISCUSS TEST RESULTS: ICD-10-CM

## 2024-11-07 DIAGNOSIS — I10 ESSENTIAL HYPERTENSION: ICD-10-CM

## 2024-11-07 DIAGNOSIS — R71.8 ELEVATED MCV: ICD-10-CM

## 2024-11-07 DIAGNOSIS — D72.829 LEUKOCYTOSIS, UNSPECIFIED TYPE: ICD-10-CM

## 2024-11-07 DIAGNOSIS — Z23 ENCOUNTER FOR IMMUNIZATION: ICD-10-CM

## 2024-11-07 DIAGNOSIS — Z00.00 ANNUAL PHYSICAL EXAM: Primary | ICD-10-CM

## 2024-11-07 DIAGNOSIS — R00.2 PALPITATIONS: ICD-10-CM

## 2024-11-07 PROBLEM — A90 DENGUE FEVER: Status: RESOLVED | Noted: 2024-04-08 | Resolved: 2024-11-07

## 2024-11-07 PROCEDURE — 90471 IMMUNIZATION ADMIN: CPT | Performed by: STUDENT IN AN ORGANIZED HEALTH CARE EDUCATION/TRAINING PROGRAM

## 2024-11-07 PROCEDURE — 93000 ELECTROCARDIOGRAM COMPLETE: CPT | Performed by: STUDENT IN AN ORGANIZED HEALTH CARE EDUCATION/TRAINING PROGRAM

## 2024-11-07 PROCEDURE — 90677 PCV20 VACCINE IM: CPT | Performed by: STUDENT IN AN ORGANIZED HEALTH CARE EDUCATION/TRAINING PROGRAM

## 2024-11-07 PROCEDURE — 99396 PREV VISIT EST AGE 40-64: CPT | Performed by: STUDENT IN AN ORGANIZED HEALTH CARE EDUCATION/TRAINING PROGRAM

## 2024-11-07 PROCEDURE — 99214 OFFICE O/P EST MOD 30 MIN: CPT | Performed by: STUDENT IN AN ORGANIZED HEALTH CARE EDUCATION/TRAINING PROGRAM

## 2024-11-07 NOTE — PATIENT INSTRUCTIONS
"Patient Education     Routine physical for adults   The Basics   Written by the doctors and editors at [facility]   What is a physical? -- A physical is a routine visit, or \"check-up,\" with your doctor. You might also hear it called a \"wellness visit\" or \"preventive visit.\"  During each visit, the doctor will:   Ask about your physical and mental health   Ask about your habits, behaviors, and lifestyle   Do an exam   Give you vaccines if needed   Talk to you about any medicines you take   Give advice about your health   Answer your questions  Getting regular check-ups is an important part of taking care of your health. It can help your doctor find and treat any problems you have. But it's also important for preventing health problems.  A routine physical is different from a \"sick visit.\" A sick visit is when you see a doctor because of a health concern or problem. Since physicals are scheduled ahead of time, you can think about what you want to ask the doctor.  How often should I get a physical? -- It depends on your age and health. In general, for people age 21 years and older:   If you are younger than 50 years, you might be able to get a physical every 3 years.   If you are 50 years or older, your doctor might recommend a physical every year.  If you have an ongoing health condition, like diabetes or high blood pressure, your doctor will probably want to see you more often.  What happens during a physical? -- In general, each visit will include:   Physical exam - The doctor or nurse will check your height, weight, heart rate, and blood pressure. They will also look at your eyes and ears. They will ask about how you are feeling and whether you have any symptoms that bother you.   Medicines - It's a good idea to bring a list of all the medicines you take to each doctor visit. Your doctor will talk to you about your medicines and answer any questions. Tell them if you are having any side effects that bother you. You " "should also tell them if you are having trouble paying for any of your medicines.   Habits and behaviors - This includes:   Your diet   Your exercise habits   Whether you smoke, drink alcohol, or use drugs   Whether you are sexually active   Whether you feel safe at home  Your doctor will talk to you about things you can do to improve your health and lower your risk of health problems. They will also offer help and support. For example, if you want to quit smoking, they can give you advice and might prescribe medicines. If you want to improve your diet or get more physical activity, they can help you with this, too.   Lab tests, if needed - The tests you get will depend on your age and situation. For example, your doctor might want to check your:   Cholesterol   Blood sugar   Iron level   Vaccines - The recommended vaccines will depend on your age, health, and what vaccines you already had. Vaccines are very important because they can prevent certain serious or deadly infections.   Discussion of screening - \"Screening\" means checking for diseases or other health problems before they cause symptoms. Your doctor can recommend screening based on your age, risk, and preferences. This might include tests to check for:   Cancer, such as breast, prostate, cervical, ovarian, colorectal, prostate, lung, or skin cancer   Sexually transmitted infections, such as chlamydia and gonorrhea   Mental health conditions like depression and anxiety  Your doctor will talk to you about the different types of screening tests. They can help you decide which screenings to have. They can also explain what the results might mean.   Answering questions - The physical is a good time to ask the doctor or nurse questions about your health. If needed, they can refer you to other doctors or specialists, too.  Adults older than 65 years often need other care, too. As you get older, your doctor will talk to you about:   How to prevent falling at " home   Hearing or vision tests   Memory testing   How to take your medicines safely   Making sure that you have the help and support you need at home  All topics are updated as new evidence becomes available and our peer review process is complete.  This topic retrieved from [employer] on: May 02, 2024.  Topic 027495 Version 1.0  Release: 32.4.3 - C32.122  © 2024 UpToDate, Inc. and/or its affiliates. All rights reserved.  Consumer Information Use and Disclaimer   Disclaimer: This generalized information is a limited summary of diagnosis, treatment, and/or medication information. It is not meant to be comprehensive and should be used as a tool to help the user understand and/or assess potential diagnostic and treatment options. It does NOT include all information about conditions, treatments, medications, side effects, or risks that may apply to a specific patient. It is not intended to be medical advice or a substitute for the medical advice, diagnosis, or treatment of a health care provider based on the health care provider's examination and assessment of a patient's specific and unique circumstances. Patients must speak with a health care provider for complete information about their health, medical questions, and treatment options, including any risks or benefits regarding use of medications. This information does not endorse any treatments or medications as safe, effective, or approved for treating a specific patient. UpToDate, Inc. and its affiliates disclaim any warranty or liability relating to this information or the use thereof.The use of this information is governed by the Terms of Use, available at https://www.[employer]Now Technologiesuwer.com/en/know/clinical-effectiveness-terms. 2024© UpToDate, Inc. and its affiliates and/or licensors. All rights reserved.  Copyright   © 2024 UpToDate, Inc. and/or its affiliates. All rights reserved.

## 2024-11-07 NOTE — PROGRESS NOTES
[facility] HEALTH MAINTENANCE OFFICE VISIT  [facility] - [facility] [place]    NAME: Sean Thomas  AGE: 58 y.o. SEX: male  : 1965     DATE: 2024    Assessment and Plan     1. Annual physical exam  2. Encounter for immunization  -     Pneumococcal Conjugate Vaccine 20-valent (Pcv20)  3. Screen for colon cancer  -     Ambulatory Referral to Gastroenterology; Future  4. Elevated MCV  -     Vitamin B12; Future  -     Folate; Future  5. Leukocytosis, unspecified type  -     CBC (Includes Diff/Plt) (Refl); Future; Expected date: 2024  6. Essential hypertension  -     Basic metabolic panel; Future; Expected date: 2025  7. Dental infection        - Treat dental infection and follow-up repeat CBC monitor WBC  -     amoxicillin-clavulanate (AUGMENTIN) 875-125 mg per tablet; Take 1 tablet by mouth every 12 (twelve) hours for 5 days  8. Encounter to discuss test results  -  I have reviewed pertinent labs:  CBC:   Lab Results   Component Value Date    WBC 11.42 (H) 2024    RBC 4.90 2024    HGB 16.4 2024    HCT 49.5 (H) 2024     (H) 2024     2024    MCH 33.5 2024    MCHC 33.1 2024    RDW 12.4 2024    MPV 11.1 2024    NEUTROABS 8.25 (H) 2024     CMP:   Lab Results   Component Value Date    SODIUM 140 2024    K 4.2 2024     2024    CO2 30 2024    AGAP 9 2024    BUN 16 2024    CREATININE 0.98 2024    GLUC 111 2024    GLUF 96 2024    CALCIUM 9.3 2024    AST 19 2024    ALT 16 2024    ALKPHOS 67 2024    TP 7.2 2024    ALB 4.1 2024    TBILI 0.79 2024    EGFR 84 2024     Lipid Profile:   Lab Results   Component Value Date    CHOLESTEROL 203 (H) 2024    HDL 48 2024    TRIG 154 (H) 2024    LDLCALC 124 (H) 2024    NONHDLC 195 2023     Thyroid Studies:   Lab  Results   Component Value Date    MSS0EPNNBNIA 1.324 11/04/2024     9. Palpitations    -Not a new issue, does see cardiology, underwent Holter monitor March 2024, PVCs  -EKG today in office NSR  -Patient will follow-up with his cardiologist  -Patient aware of signs and symptoms in which case to seek prompt medical attention    Patient Counseling:   Nutrition: Stressed importance of a well balanced diet, moderation of sodium/saturated fat, caloric balance and sufficient intake of fiber  Exercise: Stressed the importance of regular exercise with a goal of 150 minutes per week  Dental Health: Discussed daily flossing and brushing and regular dental visits   Alcohol Use:  Recommended moderation of alcohol intake  Injury Prevention: Discussed Safety Belts, Safety Helmets, and Smoke Detectors  Counseled about sunscreen use and sun protection  Immunizations reviewed:    He already received influenza vaccine this season  Counseled about PCV 20 vaccine, he would like to receive today  Tetanus booster up-to-date  Counseled about Shingrix vaccination series, he will consider for future  Discussed benefits of:    2020 colonoscopy report reviewed, recommendation for repeat colonoscopy 3 years thereafter, referral placed as is overdue  Component      Latest Ref Rng 11/4/2024   PSA      0.000 - 4.000 ng/mL 1.024    Declines preventative screenings for hepatitis C and HIV    BMI Counseling: Body mass index is 25.16 kg/m². Discussed with patient's BMI with him.       Depression Screening and Follow-up Plan: Patient was screened for depression during today's encounter. They screened negative with a PHQ-2 score of 0.      Follow-up in 6 months and sooner as needed.  Chief Complaint     Chief Complaint   Patient presents with    Establish Care       History of Present Illness     HPI    Well Adult Physical   Patient here for a comprehensive physical exam.  Patient does mention dental pain.  He has an appointment with his dermatologist  on the 19th but is concerned he has an infection.  Patient also mentions palpitations, not a new issue and is not present today but was experiencing earlier this week.  No chest pain or shortness of breath.        Diet and Physical Activity  Diet: trying to introduce more lean meats  Exercise: weekly      Depression Screen  PHQ-2/9 Depression Screening    Little interest or pleasure in doing things: 0 - not at all  Feeling down, depressed, or hopeless: 0 - not at all  PHQ-2 Score: 0  PHQ-2 Interpretation: Negative depression screen          General Health  Hearing: Slighty decreased: bilateral  Vision: goes for regular eye exams and wears glasses  Dental: regular dental visits, brushes teeth once daily, and Water Pik    Reproductive Health  No issues       The following portions of the patient's history were reviewed and updated as appropriate: allergies, current medications, past family history, past medical history, past social history, past surgical history and problem list.    Review of Systems     Review of Systems    As noted in HPI     Past Medical History     Past Medical History:   Diagnosis Date    Allergic     Anxiety     Asthma     CPAP (continuous positive airway pressure) dependence     Dengue fever 04/08/2024    Depression     Diverticulitis of colon     Dyspnea on exertion 01/20/2022    Heart murmur     Hyperlipidemia     Hypertension     Pneumonia 03/2016    Precordial pain 01/18/2022    Sleep apnea        Past Surgical History     Past Surgical History:   Procedure Laterality Date    APPENDECTOMY      COLONOSCOPY      [place] ARTHRS KNE SURG W/MENISCECTOMY MED/LAT W/SHVG Right 2/12/2024    Procedure: KNEE ARTHROSCOPIC PARTIAL MENISCECTOMY MEDIAL;  Surgeon: Morgan Cam DO;  [facility] MA[facility];  Service: Orthopedics    VASECTOMY      VasDeferens    WISDOM TOOTH EXTRACTION         Social History     Social History     Socioeconomic History    Marital status: /Civil Union     Spouse  name: None    Number of children: None    Years of education: None    Highest education level: None   Occupational History    None   Tobacco Use    Smoking status: Never    Smokeless tobacco: Never   Vaping Use    Vaping status: Never Used   Substance and Sexual Activity    Alcohol use: Yes     Alcohol/week: 7.0 standard drinks of alcohol     Types: 7 Glasses of wine per week     Comment: one glass wine per day    Drug use: Never    Sexual activity: Yes     Partners: Female     Birth control/protection: Male Sterilization   Other Topics Concern    None   Social History Narrative    Caffeine Use     Social Determinants of Health     Financial Resource Strain: Not on file   Food Insecurity: Not on file   Transportation Needs: Not on file   Physical Activity: Not on file   Stress: Not on file   Social Connections: Not on file   Intimate Partner Violence: Not on file   Housing Stability: Not on file       Family History     Family History   Problem Relation Age of Onset    Anxiety disorder Mother     Hypertension Father     Hyperlipidemia Father     No Known Problems Sister     No Known Problems Sister     No Known Problems Son     No Known Problems Son     No Known Problems Daughter     Anxiety disorder Family     Heart disease Family     Stroke Family        Current Medications       Current Outpatient Medications:     amoxicillin-clavulanate (AUGMENTIN) 875-125 mg per tablet, Take 1 tablet by mouth every 12 (twelve) hours for 5 days, Disp: 10 tablet, Rfl: 0    Budeson-Glycopyrrol-Formoterol (Breztri Aerosphere) 160-9-4.8 MCG/ACT AERO, Inhale 2 puffs 2 (two) times a day Rinse mouth after use., Disp: 10.7 g, Rfl: 8    diphenhydrAMINE (BENADRYL) 25 mg tablet, Take 25 mg by mouth as needed, Disp: , Rfl:     ezetimibe (ZETIA) 10 mg tablet, Take 1 tablet (10 mg total) by mouth daily, Disp: 90 tablet, Rfl: 1    levalbuterol (Xopenex HFA) 45 mcg/act inhaler, Inhale 1-2 puffs every 4 (four) hours as needed for wheezing, Disp:  "45 g, Rfl: 3    losartan (COZAAR) 50 mg tablet, Take 1 tablet (50 mg total) by mouth daily at bedtime, Disp: 90 tablet, Rfl: 3    meloxicam (MOBIC) 15 mg tablet, Take 1 tablet (15 mg total) by mouth daily as needed for moderate pain, Disp: 60 tablet, Rfl: 0    multivitamin (THERAGRAN) TABS, Take 1 tablet by mouth daily, Disp: , Rfl:     tiZANidine (ZANAFLEX) 4 mg tablet, Take 1 tablet (4 mg total) by mouth every 8 (eight) hours as needed for muscle spasms, Disp: 21 tablet, Rfl: 1    Triamcinolone Acetonide (Nasacort Allergy) 55 MCG/ACT nasal spray, into each nostril, Disp: , Rfl:     triamterene-hydrochlorothiazide (MAXZIDE-25) 37.5-25 mg per tablet, TAKE 1 TABLET BY MOUTH DAILY, Disp: 90 tablet, Rfl: 1     Allergies     Allergies   Allergen Reactions    Budesonide-Formoterol Fumarate Other (See Comments)     Hair loss    Rosuvastatin Myalgia       Objective     /76 (BP Location: Left arm, Patient Position: Sitting, Cuff Size: Standard)   Pulse 77   Temp 100 °F (37.8 °C) (Temporal)   Resp 18   Ht 5' 9\" (1.753 m)   Wt 77.3 kg (170 lb 6.4 oz)   SpO2 97%   BMI 25.16 kg/m²      Physical Exam  Vitals reviewed.   Constitutional:       General: He is not in acute distress.     Appearance: Normal appearance.   HENT:      Head: Normocephalic and atraumatic.      Right Ear: External ear normal.      Left Ear: External ear normal.      Nose: Nose normal.      Mouth/Throat:      Mouth: Mucous membranes are moist.      Dentition: Dental tenderness present.      Pharynx: Oropharynx is clear.     Eyes:      Extraocular Movements: Extraocular movements intact.      Conjunctiva/sclera: Conjunctivae normal.      Pupils: Pupils are equal, round, and reactive to light.   Cardiovascular:      Rate and Rhythm: Normal rate and regular rhythm.      Pulses: Normal pulses.      Heart sounds: Normal heart sounds.   Pulmonary:      Effort: Pulmonary effort is normal.      Breath sounds: Normal breath sounds.   Abdominal:      " General: Abdomen is flat. Bowel sounds are normal.      Palpations: Abdomen is soft.      Tenderness: There is no abdominal tenderness.   Musculoskeletal:      Cervical back: Neck supple.      Right lower leg: No edema.      Left lower leg: No edema.   Lymphadenopathy:      Cervical: No cervical adenopathy.   Skin:     General: Skin is warm and dry.      Capillary Refill: Capillary refill takes less than 2 seconds.   Neurological:      Mental Status: He is alert and oriented to person, place, and time.   Psychiatric:         Mood and Affect: Mood normal.         Behavior: Behavior normal.         Thought Content: Thought content normal.               Bhanu Acosta DO  [facility]

## 2024-12-11 DIAGNOSIS — I10 ESSENTIAL HYPERTENSION: ICD-10-CM

## 2024-12-12 RX ORDER — TRIAMTERENE AND HYDROCHLOROTHIAZIDE 37.5; 25 MG/1; MG/1
1 TABLET ORAL DAILY
Qty: 90 TABLET | Refills: 1 | Status: SHIPPED | OUTPATIENT
Start: 2024-12-12

## 2024-12-31 ENCOUNTER — APPOINTMENT (OUTPATIENT)
Dept: RADIOLOGY | Facility: MEDICAL CENTER | Age: 59
End: 2024-12-31
Payer: COMMERCIAL

## 2024-12-31 ENCOUNTER — APPOINTMENT (OUTPATIENT)
Dept: LAB | Facility: CLINIC | Age: 59
End: 2024-12-31
Payer: COMMERCIAL

## 2024-12-31 ENCOUNTER — OFFICE VISIT (OUTPATIENT)
Dept: PULMONOLOGY | Facility: CLINIC | Age: 59
End: 2024-12-31
Payer: COMMERCIAL

## 2024-12-31 VITALS
OXYGEN SATURATION: 97 % | WEIGHT: 178 LBS | SYSTOLIC BLOOD PRESSURE: 122 MMHG | BODY MASS INDEX: 26.36 KG/M2 | TEMPERATURE: 97.8 F | HEIGHT: 69 IN | DIASTOLIC BLOOD PRESSURE: 74 MMHG | HEART RATE: 74 BPM

## 2024-12-31 DIAGNOSIS — G47.33 OSA (OBSTRUCTIVE SLEEP APNEA): ICD-10-CM

## 2024-12-31 DIAGNOSIS — J45.40 MODERATE PERSISTENT ASTHMA WITHOUT COMPLICATION: Primary | ICD-10-CM

## 2024-12-31 DIAGNOSIS — J45.40 MODERATE PERSISTENT ASTHMA WITHOUT COMPLICATION: ICD-10-CM

## 2024-12-31 LAB
BASOPHILS # BLD AUTO: 0.07 THOUSANDS/ΜL (ref 0–0.1)
BASOPHILS NFR BLD AUTO: 1 % (ref 0–1)
EOSINOPHIL # BLD AUTO: 0.13 THOUSAND/ΜL (ref 0–0.61)
EOSINOPHIL NFR BLD AUTO: 2 % (ref 0–6)
ERYTHROCYTE [DISTWIDTH] IN BLOOD BY AUTOMATED COUNT: 12.3 % (ref 11.6–15.1)
HCT VFR BLD AUTO: 48.7 % (ref 36.5–49.3)
HGB BLD-MCNC: 16.8 G/DL (ref 12–17)
IMM GRANULOCYTES # BLD AUTO: 0.03 THOUSAND/UL (ref 0–0.2)
IMM GRANULOCYTES NFR BLD AUTO: 0 % (ref 0–2)
LYMPHOCYTES # BLD AUTO: 1.64 THOUSANDS/ΜL (ref 0.6–4.47)
LYMPHOCYTES NFR BLD AUTO: 22 % (ref 14–44)
MCH RBC QN AUTO: 33.4 PG (ref 26.8–34.3)
MCHC RBC AUTO-ENTMCNC: 34.5 G/DL (ref 31.4–37.4)
MCV RBC AUTO: 97 FL (ref 82–98)
MONOCYTES # BLD AUTO: 0.88 THOUSAND/ΜL (ref 0.17–1.22)
MONOCYTES NFR BLD AUTO: 12 % (ref 4–12)
NEUTROPHILS # BLD AUTO: 4.88 THOUSANDS/ΜL (ref 1.85–7.62)
NEUTS SEG NFR BLD AUTO: 63 % (ref 43–75)
NRBC BLD AUTO-RTO: 0 /100 WBCS
PLATELET # BLD AUTO: 197 THOUSANDS/UL (ref 149–390)
PMV BLD AUTO: 10.7 FL (ref 8.9–12.7)
RBC # BLD AUTO: 5.03 MILLION/UL (ref 3.88–5.62)
WBC # BLD AUTO: 7.63 THOUSAND/UL (ref 4.31–10.16)

## 2024-12-31 PROCEDURE — 85025 COMPLETE CBC W/AUTO DIFF WBC: CPT

## 2024-12-31 PROCEDURE — 71046 X-RAY EXAM CHEST 2 VIEWS: CPT

## 2024-12-31 PROCEDURE — 94010 BREATHING CAPACITY TEST: CPT | Performed by: INTERNAL MEDICINE

## 2024-12-31 PROCEDURE — 36415 COLL VENOUS BLD VENIPUNCTURE: CPT

## 2024-12-31 PROCEDURE — 99214 OFFICE O/P EST MOD 30 MIN: CPT | Performed by: INTERNAL MEDICINE

## 2024-12-31 NOTE — ASSESSMENT & PLAN NOTE
Crescencio notes increasing symptoms that are consistent with asthma, but he has testing that is inconsistent with this diagnosis.  He has only demonstrated decreased diffusion and restriction on prior pulmonary function tests, though his pattern of symptoms is very classic for asthma, including the burning chest discomfort he gets with cold air.    Update chest x-ray  Check CBC with differential to assess for candidacy for biologic therapy  Continue Breztri 2 puffs twice daily, consider addition of Pulmicort to this given that he is still only in the moderate intensity of steroid administration  Favor starting to Spiriva or if this is an option after testing  Consider CT chest given atypical testing results

## 2024-12-31 NOTE — ASSESSMENT & PLAN NOTE
Crescencio is doing well with CPAP, but compliance is somewhat suboptimal.  This is due to dental pain which is compounded by pressure from his CPAP mask.  Improved compliance recommended.  No change to settings required based on compliance report.

## 2024-12-31 NOTE — PROGRESS NOTES
Pulmonary Follow Up Note   Sean Thomas 59 y.o. male MRN: [id number]  12/31/2024    Assessment:    ALEX (obstructive sleep apnea)  Crescencio is doing well with CPAP, but compliance is somewhat suboptimal.  This is due to dental pain which is compounded by pressure from his CPAP mask.  Improved compliance recommended.  No change to settings required based on compliance report.    Moderate persistent asthma without complication  Crescencio notes increasing symptoms that are consistent with asthma, but he has testing that is inconsistent with this diagnosis.  He has only demonstrated decreased diffusion and restriction on prior pulmonary function tests, though his pattern of symptoms is very classic for asthma, including the burning chest discomfort he gets with cold air.    Update chest x-ray  Check CBC with differential to assess for candidacy for biologic therapy  Continue Breztri 2 puffs twice daily, consider addition of Pulmicort to this given that he is still only in the moderate intensity of steroid administration  Favor starting to Spiriva or if this is an option after testing  Consider CT chest given atypical testing results    Plan:    Diagnoses and all orders for this visit:    Moderate persistent asthma without complication  -     POCT spirometry  -     CBC and differential; Future  -     XR chest pa and lateral; Future    ALEX (obstructive sleep apnea)    Return in about 3 months (around 3/31/2025).    History of Present Illness   HPI:  Sean Thomas is a 59 y.o. male who presents for routine scheduled follow-up.  He is having acute issues to report today.      With regards to his CPAP device, he still gets good benefit from using the machine, but his compliance has been limited by pressure on the left side of his face which is aggravating and irritated dental root in his left sinus area.  Compliance report was reviewed with 60% of days with greater than 4 hours usage.  Residual AHI on therapy is 1.6.  He does  reach up peak pressure of 11.9 on a set pressure range of 4-12.  Mask leak is mostly less than 20 L/min.    His breathing is his most acute issue today.  He reports that in the colder air, he has been having difficulty breathing, more shortness of breath, and painful breathing.  The pain is described as a burning sensation as the air is going into his chest.  It is improved when he wears a scarf to warm up the air he is breathing.  He will also get short of breath with longer conversations, as well as commonly at work.    We again discussed the potential usage of biologic therapy given his persistent symptoms despite adequate inhaler regimen.  He was amenable if this was indicated for him.  We also discussed that he is only on a moderate intensity steroid, but his preference was to avoid additional steroids if possible.    Review of Systems   Respiratory:  Positive for shortness of breath.    Cardiovascular:  Positive for chest pain.     Historical Information   Past Medical History:   Diagnosis Date   • Allergic    • Anxiety    • Asthma    • CPAP (continuous positive airway pressure) dependence    • Dengue fever 04/08/2024   • Depression    • Diverticulitis of colon    • Dyspnea on exertion 01/20/2022   • Heart murmur    • Hyperlipidemia    • Hypertension    • Pneumonia 03/2016   • Precordial pain 01/18/2022   • Sleep apnea      Past Surgical History:   Procedure Laterality Date   • APPENDECTOMY     • COLONOSCOPY     • [place] ARTH[place] KNE SURG W/MENISCECTOMY MED/LAT W/SHVG Right 2/12/2024    Procedure: KNEE ARTHROSCOPIC PARTIAL MENISCECTOMY MEDIAL;  Surgeon: Morgan Cam DO;  Location[facility] OR;  Service: Orthopedics   • VASECTOMY      VasDeferens   • WISDOM TOOTH EXTRACTION       Family History   Problem Relation Age of Onset   • Anxiety disorder Mother    • Hypertension Father    • Hyperlipidemia Father    • No Known Problems Sister    • No Known Problems Sister    • No Known Problems Son    • No Known  "Problems Son    • No Known Problems Daughter    • Anxiety disorder Family    • Heart disease Family    • Stroke Family        Meds/Allergies     Current Outpatient Medications:   •  Budeson-Glycopyrrol-Formoterol (Breztri Aerosphere) 160-9-4.8 MCG/ACT AERO, Inhale 2 puffs 2 (two) times a day Rinse mouth after use., Disp: 10.7 g, Rfl: 8  •  diphenhydrAMINE (BENADRYL) 25 mg tablet, Take 25 mg by mouth as needed, Disp: , Rfl:   •  ezetimibe (ZETIA) 10 mg tablet, Take 1 tablet (10 mg total) by mouth daily, Disp: 90 tablet, Rfl: 1  •  levalbuterol (Xopenex HFA) 45 mcg/act inhaler, Inhale 1-2 puffs every 4 (four) hours as needed for wheezing, Disp: 45 g, Rfl: 3  •  losartan (COZAAR) 50 mg tablet, Take 1 tablet (50 mg total) by mouth daily at bedtime, Disp: 90 tablet, Rfl: 3  •  meloxicam (MOBIC) 15 mg tablet, Take 1 tablet (15 mg total) by mouth daily as needed for moderate pain, Disp: 60 tablet, Rfl: 0  •  multivitamin (THERAGRAN) TABS, Take 1 tablet by mouth daily, Disp: , Rfl:   •  tiZANidine (ZANAFLEX) 4 mg tablet, Take 1 tablet (4 mg total) by mouth every 8 (eight) hours as needed for muscle spasms, Disp: 21 tablet, Rfl: 1  •  Triamcinolone Acetonide (Nasacort Allergy) 55 MCG/ACT nasal spray, into each nostril, Disp: , Rfl:   •  triamterene-hydrochlorothiazide (MAXZIDE-25) 37.5-25 mg per tablet, Take 1 tablet by mouth daily, Disp: 90 tablet, Rfl: 1  Allergies   Allergen Reactions   • Budesonide-Formoterol Fumarate Other (See Comments)     Hair loss   • Rosuvastatin Myalgia       Vitals: Blood pressure 122/74, pulse 74, temperature 97.8 °F (36.6 °C), height 5' 9\" (1.753 m), weight 80.7 kg (178 lb), SpO2 97%. Body mass index is 26.29 kg/m². Oxygen Therapy  SpO2: 97 %    Physical Exam  Physical Exam  Vitals reviewed.   Constitutional:       General: He is not in acute distress.     Appearance: Normal appearance. He is well-developed. He is not ill-appearing.   HENT:      Head: Normocephalic and atraumatic.   Eyes:      " General: No scleral icterus.     Conjunctiva/sclera: Conjunctivae normal.   Neck:      Thyroid: No thyromegaly.      Vascular: No JVD.   Cardiovascular:      Rate and Rhythm: Normal rate and regular rhythm.      Heart sounds: Normal heart sounds. No murmur heard.     No friction rub. No gallop.   Pulmonary:      Effort: Pulmonary effort is normal. No respiratory distress.      Breath sounds: Normal breath sounds. No wheezing or rales.   Musculoskeletal:      Cervical back: Neck supple.      Right lower leg: No edema.      Left lower leg: No edema.   Skin:     General: Skin is warm and dry.      Findings: No rash.   Neurological:      General: No focal deficit present.      Mental Status: He is alert and oriented to person, place, and time. Mental status is at baseline.   Psychiatric:         Mood and Affect: Mood normal.         Behavior: Behavior normal.     Labs: I have personally reviewed pertinent lab results.  Lab Results   Component Value Date    WBC 7.63 12/31/2024    HGB 16.8 12/31/2024    HCT 48.7 12/31/2024    MCV 97 12/31/2024     12/31/2024     Lab Results   Component Value Date    CALCIUM 9.3 11/04/2024    K 4.2 11/04/2024    CO2 30 11/04/2024     11/04/2024    BUN 16 11/04/2024    CREATININE 0.98 11/04/2024     Lab Results   Component Value Date    IGE 38.3 05/09/2023     Lab Results   Component Value Date    ALT 16 11/04/2024    AST 19 11/04/2024    ALKPHOS 67 11/04/2024       Imaging and other studies: I have personally reviewed relevant films in PACS.    EKG, Pathology, and Other Studies: I have personally reviewed relevant reports in Epic.    Crispin Ratliff M.D.  [facility]

## 2025-01-02 ENCOUNTER — RESULTS FOLLOW-UP (OUTPATIENT)
Dept: PULMONOLOGY | Facility: CLINIC | Age: 60
End: 2025-01-02

## 2025-01-02 DIAGNOSIS — J98.4 RESTRICTIVE LUNG DISEASE: Primary | ICD-10-CM

## 2025-01-09 ENCOUNTER — HOSPITAL ENCOUNTER (OUTPATIENT)
Dept: RADIOLOGY | Facility: MEDICAL CENTER | Age: 60
Discharge: HOME | End: 2025-01-09
Payer: COMMERCIAL

## 2025-01-09 DIAGNOSIS — J98.4 RESTRICTIVE LUNG DISEASE: ICD-10-CM

## 2025-01-09 PROCEDURE — 71250 CT THORAX DX C-: CPT

## 2025-01-13 DIAGNOSIS — Z00.6 ENCOUNTER FOR EXAMINATION FOR NORMAL COMPARISON OR CONTROL IN CLINICAL RESEARCH PROGRAM: ICD-10-CM

## 2025-01-15 ENCOUNTER — RESULTS FOLLOW-UP (OUTPATIENT)
Dept: PULMONOLOGY | Facility: CLINIC | Age: 60
End: 2025-01-15

## 2025-01-16 ENCOUNTER — APPOINTMENT (OUTPATIENT)
Dept: LAB | Facility: CLINIC | Age: 60
End: 2025-01-16

## 2025-01-16 ENCOUNTER — PATIENT MESSAGE (OUTPATIENT)
Dept: PULMONOLOGY | Facility: CLINIC | Age: 60
End: 2025-01-16

## 2025-01-16 DIAGNOSIS — Z00.6 ENCOUNTER FOR EXAMINATION FOR NORMAL COMPARISON OR CONTROL IN CLINICAL RESEARCH PROGRAM: ICD-10-CM

## 2025-01-16 PROCEDURE — 36415 COLL VENOUS BLD VENIPUNCTURE: CPT

## 2025-01-24 DIAGNOSIS — E78.00 PURE HYPERCHOLESTEROLEMIA: ICD-10-CM

## 2025-01-24 DIAGNOSIS — J45.40 MODERATE PERSISTENT ASTHMA WITHOUT COMPLICATION: ICD-10-CM

## 2025-01-24 RX ORDER — BUDESONIDE, GLYCOPYRROLATE, AND FORMOTEROL FUMARATE 160; 9; 4.8 UG/1; UG/1; UG/1
AEROSOL, METERED RESPIRATORY (INHALATION)
Qty: 32.1 G | Refills: 3 | Status: SHIPPED | OUTPATIENT
Start: 2025-01-24

## 2025-01-24 RX ORDER — EZETIMIBE 10 MG/1
10 TABLET ORAL DAILY
Qty: 90 TABLET | Refills: 1 | Status: SHIPPED | OUTPATIENT
Start: 2025-01-24

## 2025-01-27 LAB
APOB+LDLR+PCSK9 GENE MUT ANL BLD/T: NOT DETECTED
BRCA1+BRCA2 DEL+DUP + FULL MUT ANL BLD/T: NOT DETECTED
MLH1+MSH2+MSH6+PMS2 GN DEL+DUP+FUL M: NOT DETECTED

## 2025-02-06 ENCOUNTER — TELEPHONE (OUTPATIENT)
Dept: PULMONOLOGY | Facility: CLINIC | Age: 60
End: 2025-02-06

## 2025-02-06 NOTE — TELEPHONE ENCOUNTER
Called and spoke to Crescencio. He stated he would prefer to go to the [facility] for his Tezspire injections. Advised I will work on auth and call him once I receive a determination from his insurance.

## 2025-03-02 DIAGNOSIS — I10 ESSENTIAL HYPERTENSION: ICD-10-CM

## 2025-03-04 RX ORDER — LOSARTAN POTASSIUM 50 MG/1
50 TABLET ORAL
Qty: 90 TABLET | Refills: 1 | Status: SHIPPED | OUTPATIENT
Start: 2025-03-04

## 2025-03-11 ENCOUNTER — NURSE TRIAGE (OUTPATIENT)
Age: 60
End: 2025-03-11

## 2025-03-11 ENCOUNTER — TELEPHONE (OUTPATIENT)
Dept: GASTROENTEROLOGY | Facility: MEDICAL CENTER | Age: 60
End: 2025-03-11

## 2025-03-11 DIAGNOSIS — I48.0 PAF (PAROXYSMAL ATRIAL FIBRILLATION) (HCC): ICD-10-CM

## 2025-03-11 DIAGNOSIS — R00.2 PALPITATIONS: ICD-10-CM

## 2025-03-11 DIAGNOSIS — R55 RECURRENT SYNCOPE: Primary | ICD-10-CM

## 2025-03-11 NOTE — TELEPHONE ENCOUNTER
"FOLLOW UP: Pt wanting further advise on what he should do regarding his increased palpitations.     REASON FOR CONVERSATION: Palpitations    SYMPTOMS: Received iPaymentt message form pt stating, \"Hi Dr Mccormack  During our last visit you recommended that if things change with the heart palpitations I should reach out.      At this time they have increased great in frequency and they are lasting longer. I would say it’s happening at least once every day and sometimes more. The longest it lasted was a little over an hour. Usually it’s 5 to 10 minutes      I would head to [facility] but with them ending so quickly I’m sure they won’t see anything useful      I did make an appointment with you today but it’s not until 4/21.      Wondering if you have any advice in the meantime\"    Called and s/w pt to gather more info. He stated the palpations started again the end of January, beginning of February. He stated they last longer than they did before, stating sometimes they last between 5-10 min and sometimes they can last for about an hour. He stated he has been feeling \"weaker\" than he normally does. Also states he has been feeling more sob but is also struggling with asthma. Denies dizziness. He stated when he gets the palpitations, he checks his apple watch to see his rhythm which tells him he is in afib. He stated when the palpitations resolve, his rhythm goes back to normal sinus. He does not recall what his HR is during the episodes nor does he take his BP.    An appt was scheduled for pt for 3/20 however advised pt will send a message to the provider in the meantime to review and advise.     OTHER: Advised pt if he were to get the palpitations again and they do not resolve, he should be seen in the ED. He voiced understanding.     DISPOSITION: Discuss with Provider and Call Back Patient      Answer Assessment - Initial Assessment Questions  1. DESCRIPTION: \"Please describe your heart rate or heartbeat that you are having\" (e.g., " "fast/slow, regular/irregular, skipped or extra beats, \"palpitations\")      Has been having palpitations for years but stated has been getting them more frequently starting end of January, beginning of February. States before they would last for about one min but now they are lasting for about 5-10 mins   2. ONSET: \"When did it start?\" (e.g., minutes, hours, days)       end of January, beginning of February.  3. DURATION: \"How long does it last\" (e.g., seconds, minutes, hours)      5-10 min, once had it for about an hour   4. PATTERN \"Does it come and go, or has it been constant since it started?\"  \"Does it get worse with exertion?\"   \"Are you feeling it now?\"      Comes and goes   6. HEART RATE: \"Can you tell me your heart rate?\" \"How many beats in 15 seconds?\"  Note: Not all patients can do this.        He checked his heart rhythm on his apple watch while having palpitations which told him he was in afib.   7. RECURRENT SYMPTOM: \"Have you ever had this before?\" If Yes, ask: \"When was the last time?\" and \"What happened that time?\"       Yes   8. CAUSE: \"What do you think is causing the palpitations?\"      Unsure   9. CARDIAC HISTORY: \"Do you have any history of heart disease?\" (e.g., heart attack, angina, bypass surgery, angioplasty, arrhythmia)       Palpitations   10. OTHER SYMPTOMS: \"Do you have any other symptoms?\" (e.g., dizziness, chest pain, sweating, difficulty breathing)        States he feels very weak when he gets the palpitations. States does have some sob but does have asthma    Protocols used: Heart Rate and Heartbeat Questions-Adult-[place]    "

## 2025-03-11 NOTE — TELEPHONE ENCOUNTER
03/11/25  Screened by: Alycia See    Referring Provider Bhanu Acosta, DO    Pre- Screening:     BMI: 26.29  Has patient been referred for a routine screening Colonoscopy? yes  Is the patient between 45-75 years old? yes      Previous Colonoscopy yes   If yes:    Date: 2022    Facility: [facility]    Reason: Polyps      SCHEDULING STAFF: If the patient is between 45yrs-49yrs, please advise patient to confirm benefits/coverage with their insurance company for a routine screening colonoscopy, some insurance carriers will only cover at 50yrs or older. If the patient is over 75years old, please schedule an office visit.     Does the patient want to see a Gastroenterologist prior to their procedure OR are they having any GI symptoms? no    Has the patient been hospitalized or had abdominal surgery in the past 6 months? no    Does the patient use supplemental oxygen? no    Does the patient take Coumadin, Lovenox, Plavix, Elliquis, Xarelto, or other blood thinning medication? no    Has the patient had a stroke, cardiac event, or stent placed in the past year? no    SCHEDULING STAFF: If patient answers NO to above questions, then schedule procedure. If patient answers YES to above questions, then schedule office appointment.     If patient is between 45yrs - 49yrs, please advise patient that we will have to confirm benefits & coverage with their insurance company for a routine screening colonoscopy.

## 2025-03-11 NOTE — TELEPHONE ENCOUNTER
Scheduled date of Colonoscopy (as of today) April 11  Physician performing: Dr. Posey  Location of procedure:  [place]  Instructions given to patient: Miralax  Clearances: None  Diabetic: No      Prep instructions sent via [employer] and mailed to home address per patient request

## 2025-03-12 ENCOUNTER — TELEPHONE (OUTPATIENT)
Dept: GASTROENTEROLOGY | Facility: MEDICAL CENTER | Age: 60
End: 2025-03-12

## 2025-03-12 NOTE — TELEPHONE ENCOUNTER
Daniel Tong! I think to be safe we should get cardiology clearance. Fortunately his procedure is scheduled in 1 month so we can keep that appointment. He has cardiology appointment and heart ultrasound later this month so that should help us and cardiology rist stratify him. Thank you for scheduling the patient with me and thank you for reaching out. Much appreciated.

## 2025-03-12 NOTE — TELEPHONE ENCOUNTER
Our mutual patient, Sean Thomas, is scheduled for the following   procedure(s): Colonoscopy   On: April 11   With: Dr. Posey    Our office is requesting medical clearance for the upcoming procedure(s).        Thank you,  Alycia CAN   [facility]

## 2025-03-14 ENCOUNTER — HOSPITAL ENCOUNTER (OUTPATIENT)
Dept: NON INVASIVE DIAGNOSTICS | Facility: CLINIC | Age: 60
Discharge: HOME | End: 2025-03-14
Payer: COMMERCIAL

## 2025-03-14 ENCOUNTER — RESULTS FOLLOW-UP (OUTPATIENT)
Dept: CARDIOLOGY CLINIC | Facility: CLINIC | Age: 60
End: 2025-03-14

## 2025-03-14 VITALS
BODY MASS INDEX: 26.35 KG/M2 | DIASTOLIC BLOOD PRESSURE: 74 MMHG | WEIGHT: 177.91 LBS | SYSTOLIC BLOOD PRESSURE: 122 MMHG | HEART RATE: 74 BPM | HEIGHT: 69 IN

## 2025-03-14 DIAGNOSIS — I48.0 PAF (PAROXYSMAL ATRIAL FIBRILLATION) (HCC): ICD-10-CM

## 2025-03-14 DIAGNOSIS — R00.2 PALPITATIONS: ICD-10-CM

## 2025-03-14 LAB
AORTIC ROOT: 3.3 CM
ASCENDING AORTA: 3.1 CM
BSA FOR ECHO PROCEDURE: 1.97 M2
E WAVE DECELERATION TIME: 237 MS
E/A RATIO: 0.87
FRACTIONAL SHORTENING: 33 (ref 28–44)
INTERVENTRICULAR SEPTUM IN DIASTOLE (PARASTERNAL SHORT AXIS VIEW): 1.1 CM
INTERVENTRICULAR SEPTUM: 1.1 CM (ref 0.6–1.1)
LAAS-AP2: 13.7 CM2
LAAS-AP4: 9.4 CM2
LEFT ATRIUM SIZE: 2.8 CM
LEFT ATRIUM VOLUME (MOD BIPLANE): 26 ML
LEFT ATRIUM VOLUME INDEX (MOD BIPLANE): 13.2 ML/M2
LEFT INTERNAL DIMENSION IN SYSTOLE: 2.6 CM (ref 2.1–4)
LEFT VENTRICULAR INTERNAL DIMENSION IN DIASTOLE: 3.9 CM (ref 3.5–6)
LEFT VENTRICULAR POSTERIOR WALL IN END DIASTOLE: 1 CM
LEFT VENTRICULAR STROKE VOLUME: 41 ML
LV EF US.2D.A4C+ESTIMATED: 58 %
LVSV (TEICH): 41 ML
MV E'TISSUE VEL-LAT: 10 CM/S
MV E'TISSUE VEL-SEP: 9 CM/S
MV PEAK A VEL: 0.83 M/S
MV PEAK E VEL: 72 CM/S
MV STENOSIS PRESSURE HALF TIME: 69 MS
MV VALVE AREA P 1/2 METHOD: 3.19
RIGHT ATRIAL 2D VOLUME: 27 ML
RIGHT ATRIUM AREA SYSTOLE A4C: 12 CM2
RIGHT VENTRICLE ID DIMENSION: 2.9 CM
SL CV LEFT ATRIUM LENGTH A2C: 4 CM
SL CV LV EF: 55
SL CV PED ECHO LEFT VENTRICLE DIASTOLIC VOLUME (MOD BIPLANE) 2D: 65 ML
SL CV PED ECHO LEFT VENTRICLE SYSTOLIC VOLUME (MOD BIPLANE) 2D: 23 ML
TRICUSPID ANNULAR PLANE SYSTOLIC EXCURSION: 2 CM

## 2025-03-14 PROCEDURE — 93306 TTE W/DOPPLER COMPLETE: CPT | Performed by: STUDENT IN AN ORGANIZED HEALTH CARE EDUCATION/TRAINING PROGRAM

## 2025-03-14 PROCEDURE — 93306 TTE W/DOPPLER COMPLETE: CPT

## 2025-03-17 NOTE — TELEPHONE ENCOUNTER
Thanks for the update.  I have added my  Felipa to the message to let her know that procedure has been approved by cardiology.  Thank you.

## 2025-03-20 ENCOUNTER — OFFICE VISIT (OUTPATIENT)
Dept: CARDIOLOGY CLINIC | Facility: CLINIC | Age: 60
End: 2025-03-20
Payer: COMMERCIAL

## 2025-03-20 VITALS
HEART RATE: 88 BPM | HEIGHT: 69 IN | DIASTOLIC BLOOD PRESSURE: 62 MMHG | OXYGEN SATURATION: 98 % | SYSTOLIC BLOOD PRESSURE: 128 MMHG | WEIGHT: 272 LBS | BODY MASS INDEX: 40.29 KG/M2 | TEMPERATURE: 97.7 F

## 2025-03-20 DIAGNOSIS — R00.2 PALPITATIONS: Primary | ICD-10-CM

## 2025-03-20 DIAGNOSIS — R55 RECURRENT SYNCOPE: ICD-10-CM

## 2025-03-20 DIAGNOSIS — E78.00 PURE HYPERCHOLESTEROLEMIA: ICD-10-CM

## 2025-03-20 DIAGNOSIS — R07.2 PRECORDIAL PAIN: ICD-10-CM

## 2025-03-20 DIAGNOSIS — I49.3 PVC (PREMATURE VENTRICULAR CONTRACTION): ICD-10-CM

## 2025-03-20 PROCEDURE — 93000 ELECTROCARDIOGRAM COMPLETE: CPT | Performed by: INTERNAL MEDICINE

## 2025-03-20 PROCEDURE — 99214 OFFICE O/P EST MOD 30 MIN: CPT | Performed by: INTERNAL MEDICINE

## 2025-03-20 NOTE — PATIENT INSTRUCTIONS
Zio patch event monitor planned to correlate symptoms of palpitations with any cardiac rhythm abnormality.  Stress Echo testing planned for further cardiac risk assessment.  EP calling.

## 2025-03-20 NOTE — ASSESSMENT & PLAN NOTE
Recent reports of worsening palpitation.  Previous Zio patch showed PVCs that correlated with symptoms.  Recent concerns for A-fib on Apple Watch.  Repeat Zio patch requested.  Echo reviewed.  EF 55%.  Normal diastolic function.  No pericardial effusion.  No significant valvular disease.  Results discussed with the patient.  Follow-up results of the Zio patch.   He has still not received it.repeat order placed.   Continue home rhythm monitoring.        Orders:    POCT ECG    Zio Monitor; Future

## 2025-03-20 NOTE — PROGRESS NOTES
[facility]  [address] [facility] [address]  Phone#  [phone]  Fax#  [phone]  [facility] Cardiology Office Follow-up Visit             NAME: Sean Thomas  AGE: 59 y.o. SEX: male   : 1965   MRN: [id number]    DATE: 3/20/2025  TIME: 4:14 PM    Cardiology Problem list:  Post-COVID syndrome-COVID positive :  Echo-EF 60, WNL  : Echo- EF 60, mild MR, PASP 31  3/25: Echo-EF 55, normal valves  Chest pain:  Calcium score  Dyslipidemia: Rosuvastatin caused myalgias  Bronchial asthma  MVP by history  Palpitations  3/24- Zio: SR with PVCs and PACs. Correlated with symptoms.  3/25: ?Atrial fibrillation on Apple Watch-SR with PVCs  Sleep apnea: CPAP    Assessment & Plan  Palpitations  Recent reports of worsening palpitation.  Previous Zio patch showed PVCs that correlated with symptoms.  Recent concerns for A-fib on Apple Watch.  Repeat Zio patch requested.  Echo reviewed.  EF 55%.  Normal diastolic function.  No pericardial effusion.  No significant valvular disease.  Results discussed with the patient.  Follow-up results of the Zio patch.   He has still not received it.repeat order placed.   Continue home rhythm monitoring.        Orders:    POCT ECG    Zio Monitor; Future    Pure hypercholesterolemia  Historically statin intolerant.  LDL was 152 in .  Follow-up LDL  is 124.  HDL 48.  Triglycerides 154.  All parameters improved from before.  Currently on Zetia.  Continue dietary modification.  .  Previously calcium score was discussed.    BMI is 40.  Calorie restriction and exercise advised.  Avoid cholesterol rich foods such as egg yolk, red meat, high fat dairy and fried food.  Will benefit from calcium score.  This has been requested.           Precordial pain  Reports regarding nonexertional chest pain.  Discussed coronary artery calcium scoring and exercise treadmill stress echo for further cardiac risk assessment.  This will also help guide  further antiarrhythmic therapy.    Orders:    CT coronary calcium score; Future    Echo stress test, exercise; Future    PVC (premature ventricular contraction)  He is very symptomatic from his palpitations.  Does not want new medications.  Apple watch tracings reviewed: Presumed atrial fibrillation recordings seem to be sinus rhythm with artifact and frequent PVCs.  No definite sustained A-fib noted.  Zio patch event monitor planned to correlate symptoms of palpitations with any cardiac rhythm abnormality-specifically to assess for atrial fibrillation and PVC burden.  Stress testing planned for further cardiac risk assessment and evaluate LV function and to assess the response of PVCs to exercise.  Discussed beta-blockers but he wants to avoid medications for now.  EP referral due to frequent symptomatic PVCs..    Orders:    CT coronary calcium score; Future    Echo stress test, exercise; Future    Ambulatory referral to Cardiac Electrophysiology; Future           HPI:    Sean Thomas is a 59 y.o.-year-old male who presents to the cardiology clinic for follow up for the above-listed problems.  He had called the office with recurrent palpitations and probable atrial fibrillation on the Apple Watch earlier this month.  Repeat echo and Zio patch were requested.  Echo reviewed personally.  Zio patch not yet completed.  Currently on no rate controlling medications.  BMI is now 40.  LDL is 124 on Zetia.  Historically statin intolerant.   No syncope reported.  Previously tried rosuvastatin which caused myalgias.  Apple watch tracings for AF showed sinus rhythm, artifact and frequent PVCs.  No definite atrial fibrillation noted.  He also reports some shortness of breath during the episodes of PVCs.  He reports chest pain that is nonexertional.  The swelling ongoing for the last several months.  He seems to be very bothered by his PVCs that were noted today on exam as well and correlated with her symptoms.  He states  his voice becomes weak and he gets out of breath when he has frequent PVCs.  He has sleep apnea for which he regularly uses CPAP.      Past history, family history, social history, current medications, vital signs, recent lab and imaging studies and  prior cardiology studies reviewed independently on this visit.    Allergies   Allergen Reactions    Budesonide-Formoterol Fumarate Other (See Comments)     Hair loss    Rosuvastatin Myalgia       Current Outpatient Medications:     Breztri Aerosphere 160-9-4.8 MCG/ACT AERO, INHALE 2 INHALATIONS BY MOUTH  TWICE DAILY RINSE MOUTH AFTER  USE, Disp: 32.1 g, Rfl: 3    diphenhydrAMINE (BENADRYL) 25 mg tablet, Take 25 mg by mouth as needed, Disp: , Rfl:     ezetimibe (ZETIA) 10 mg tablet, TAKE 1 TABLET BY MOUTH DAILY, Disp: 90 tablet, Rfl: 1    levalbuterol (Xopenex HFA) 45 mcg/act inhaler, Inhale 1-2 puffs every 4 (four) hours as needed for wheezing, Disp: 45 g, Rfl: 3    losartan (COZAAR) 50 mg tablet, TAKE 1 TABLET BY MOUTH DAILY AT  BEDTIME, Disp: 90 tablet, Rfl: 1    meloxicam (MOBIC) 15 mg tablet, Take 1 tablet (15 mg total) by mouth daily as needed for moderate pain, Disp: 60 tablet, Rfl: 0    multivitamin (THERAGRAN) TABS, Take 1 tablet by mouth daily, Disp: , Rfl:     tiZANidine (ZANAFLEX) 4 mg tablet, Take 1 tablet (4 mg total) by mouth every 8 (eight) hours as needed for muscle spasms, Disp: 21 tablet, Rfl: 1    Triamcinolone Acetonide (Nasacort Allergy) 55 MCG/ACT nasal spray, into each nostril, Disp: , Rfl:     triamterene-hydrochlorothiazide (MAXZIDE-25) 37.5-25 mg per tablet, Take 1 tablet by mouth daily, Disp: 90 tablet, Rfl: 1    Review of Systems   Constitutional:  Negative for fatigue.   HENT: Negative.     Eyes: Negative.    Respiratory:  Negative for shortness of breath.    Cardiovascular:  Positive for palpitations. Negative for chest pain and leg swelling.   Gastrointestinal: Negative.    Endocrine: Negative.    Musculoskeletal:  Positive for arthralgias  and myalgias.   Neurological:  Negative for syncope.   Hematological: Negative.    All other systems reviewed and are negative.      Objective:     Vitals:    03/20/25 1547   BP: 128/62   Pulse: 88   Temp: 97.7 °F (36.5 °C)   SpO2: 98%     Wt Readings from Last 3 Encounters:   03/20/25 123 kg (272 lb)   03/14/25 80.7 kg (177 lb 14.6 oz)   12/31/24 80.7 kg (178 lb)     Pulse Readings from Last 3 Encounters:   03/20/25 88   03/14/25 74   12/31/24 74     BP Readings from Last 3 Encounters:   03/20/25 128/62   03/14/25 122/74   12/31/24 122/74     Physical Exam  Vitals reviewed.   Constitutional:       General: He is not in acute distress.  HENT:      Head: Normocephalic.   Neck:      Vascular: No carotid bruit.   Cardiovascular:      Rate and Rhythm: Normal rate and regular rhythm.      Heart sounds: No murmur heard.  Pulmonary:      Breath sounds: No wheezing or rales.   Musculoskeletal:         General: No swelling.   Skin:     General: Skin is warm.   Neurological:      General: No focal deficit present.      Mental Status: He is alert.   Psychiatric:         Mood and Affect: Mood normal.         Pertinent Laboratory/Diagnostic Studies:    Laboratory studies reviewed personally by Avni Mccormack MD    BMP:   Lab Results   Component Value Date    SODIUM 140 11/04/2024    K 4.2 11/04/2024     11/04/2024    CO2 30 11/04/2024    BUN 16 11/04/2024    CREATININE 0.98 11/04/2024    GLUC 111 04/08/2024    CALCIUM 9.3 11/04/2024     CBC:  Lab Results   Component Value Date    WBC 7.63 12/31/2024    HGB 16.8 12/31/2024    HCT 48.7 12/31/2024    MCV 97 12/31/2024     12/31/2024     Lipid Profile:   Lab Results   Component Value Date    HDL 48 11/04/2024     Lab Results   Component Value Date    LDLCALC 124 (H) 11/04/2024     Lab Results   Component Value Date    TRIG 154 (H) 11/04/2024      Other labs:  Lab Results   Component Value Date    ILM3NNZGPVEG 1.324 11/04/2024     Lab Results   Component Value Date    ALT  "16 11/04/2024    AST 19 11/04/2024     No results found for: \"HGBA1C\"      Imaging Studies:     Pertinent imaging studies and cardiac studies were independently reviewed on this visit and findings summarized.    I have spent a total time of 35  minutes in caring for this patient on the day of the visit/encounter including reviewing and entering of medical history, physical examination, diagnostic results, instructions for management, patient education, risk factor reduction, documenting in the medical record, sending communications to other providers, reviewing/ordering tests, medicine, procedures etc.    Avni Mccormack MD, Located within [facility]    Portions of the record may have been created with voice recognition software.  Occasional wrong word or \"sound alike\" substitutions may have occurred due to the inherent limitations of voice recognition software.  Read the chart carefully and recognize, using context, where substitutions have occurred. Please reach out to me directly for any clarifications.  "

## 2025-03-28 ENCOUNTER — HOSPITAL ENCOUNTER (OUTPATIENT)
Dept: CT IMAGING | Facility: HOSPITAL | Age: 60
End: 2025-03-28
Attending: INTERNAL MEDICINE
Payer: COMMERCIAL

## 2025-03-28 DIAGNOSIS — R07.2 PRECORDIAL PAIN: ICD-10-CM

## 2025-03-28 DIAGNOSIS — I49.3 PVC (PREMATURE VENTRICULAR CONTRACTION): ICD-10-CM

## 2025-03-28 PROCEDURE — 75571 CT HRT W/O DYE W/CA TEST: CPT

## 2025-03-31 ENCOUNTER — OFFICE VISIT (OUTPATIENT)
Dept: PULMONOLOGY | Facility: CLINIC | Age: 60
End: 2025-03-31
Payer: COMMERCIAL

## 2025-03-31 ENCOUNTER — APPOINTMENT (OUTPATIENT)
Dept: LAB | Facility: MEDICAL CENTER | Age: 60
End: 2025-03-31
Payer: COMMERCIAL

## 2025-03-31 VITALS
HEART RATE: 74 BPM | TEMPERATURE: 98 F | RESPIRATION RATE: 12 BRPM | SYSTOLIC BLOOD PRESSURE: 122 MMHG | BODY MASS INDEX: 25.68 KG/M2 | WEIGHT: 173.4 LBS | OXYGEN SATURATION: 96 % | HEIGHT: 69 IN | DIASTOLIC BLOOD PRESSURE: 78 MMHG

## 2025-03-31 DIAGNOSIS — J45.50 SEVERE PERSISTENT ASTHMA WITHOUT COMPLICATION: ICD-10-CM

## 2025-03-31 DIAGNOSIS — U09.9 POST-COVID SYNDROME: ICD-10-CM

## 2025-03-31 DIAGNOSIS — M62.81 MUSCLE WEAKNESS: ICD-10-CM

## 2025-03-31 DIAGNOSIS — M62.81 MUSCLE WEAKNESS: Primary | ICD-10-CM

## 2025-03-31 DIAGNOSIS — G47.33 OSA (OBSTRUCTIVE SLEEP APNEA): ICD-10-CM

## 2025-03-31 PROCEDURE — 82785 ASSAY OF IGE: CPT

## 2025-03-31 PROCEDURE — 86036 ANCA SCREEN EACH ANTIBODY: CPT

## 2025-03-31 PROCEDURE — 85652 RBC SED RATE AUTOMATED: CPT

## 2025-03-31 PROCEDURE — 86021 WBC ANTIBODY IDENTIFICATION: CPT

## 2025-03-31 PROCEDURE — 86225 DNA ANTIBODY NATIVE: CPT

## 2025-03-31 PROCEDURE — 86003 ALLG SPEC IGE CRUDE XTRC EA: CPT

## 2025-03-31 PROCEDURE — 86038 ANTINUCLEAR ANTIBODIES: CPT

## 2025-03-31 PROCEDURE — 82085 ASSAY OF ALDOLASE: CPT

## 2025-03-31 PROCEDURE — 82550 ASSAY OF CK (CPK): CPT

## 2025-03-31 PROCEDURE — 36415 COLL VENOUS BLD VENIPUNCTURE: CPT

## 2025-03-31 PROCEDURE — 99214 OFFICE O/P EST MOD 30 MIN: CPT | Performed by: INTERNAL MEDICINE

## 2025-03-31 PROCEDURE — 86140 C-REACTIVE PROTEIN: CPT

## 2025-03-31 RX ORDER — LEVALBUTEROL TARTRATE 45 UG/1
1-2 AEROSOL, METERED ORAL EVERY 4 HOURS PRN
Qty: 15 G | Refills: 3 | Status: SHIPPED | OUTPATIENT
Start: 2025-03-31

## 2025-03-31 NOTE — ASSESSMENT & PLAN NOTE
Crescencio was never able to be started on test prior injections, which we were not aware of.    His diagnosis is severe persistent asthma  His ACT score is 10, indicating poor control  He has been on appropriate inhalers with triple therapy (Breztri) and albuterol  Will appeal Tezspire refusal based on these factors

## 2025-03-31 NOTE — ASSESSMENT & PLAN NOTE
Crescencio complains of some muscle weakness, muscle pains, and has a mildly chronically elevated HCO3 level on sequential lab tests that could be indicative of comorbid neuromuscular disease.    Check basic serologies  Repeat PFTs with MIP/MEP/MVV  Check CK/aldolase  Consider referral to Rheumatology and/or Neurology based on results

## 2025-03-31 NOTE — PROGRESS NOTES
Pulmonary Follow Up Note   Sean Thomas 59 y.o. male MRN: [id number]  3/31/2025    Assessment:    Severe persistent asthma without complication  Crescencio was never able to be started on test prior injections, which we were not aware of.    His diagnosis is severe persistent asthma  His ACT score is 10, indicating poor control  He has been on appropriate inhalers with triple therapy (Breztri) and albuterol  Will appeal Tezspire refusal based on these factors    Muscle weakness  Crescencio complains of some muscle weakness, muscle pains, and has a mildly chronically elevated HCO3 level on sequential lab tests that could be indicative of comorbid neuromuscular disease.    Check basic serologies  Repeat PFTs with MIP/MEP/MVV  Check CK/aldolase  Consider referral to Rheumatology and/or Neurology based on results    ALEX (obstructive sleep apnea)  Compliance report reviewed.  Remains on AutoPap, pressure 4-12, max pressure 12, median pressure 9.4, AHI 2.0.  Leak is on average 4.2 L/min.  Continue current therapy which remains medically necessary for treatment of his obstructive sleep apnea.    Plan:    Diagnoses and all orders for this visit:    Muscle weakness  -     Sedimentation rate, automated; Future  -     C-reactive protein; Future  -     ANCA Screen With MPO and PR3 With Reflex To ANCA Titer; Future  -     BARRERA Screen w/Reflex Cascade; Future  -     CK; Future  -     Aldolase; Future    Severe persistent asthma without complication  -     [facility] Allergy Panel, Adult; Future  -     Complete PFT with post bronchodilator; Future    Post-COVID syndrome  -     levalbuterol (Xopenex HFA) 45 mcg/act inhaler; Inhale 1-2 puffs every 4 (four) hours as needed for wheezing    Return in about 6 weeks (around 5/12/2025).    History of Present Illness   HPI:  Sean Thomas is a 59 y.o. male who presents for routine follow-up.  He is doing no better overall.  He continues to have a primary issue of his sensation of burning with  every breath that he takes in his central chest through his bronchial tree.  He thought he might be starting to get a little better, but is unsure if he is just getting used to it.  He did have a recent illness with flulike symptoms in January from which she has not recovered with regards to a cough and mucus production.    He is also under investigation from cardiology for recurrent PVCs and is currently wearing a Holter monitor.  He has a plan for a stress test next week.    He was never able to start the Tezspire I ordered for him.  It was denied by his insurance.  He remains compliant with Breztri and has frequent, multiple times daily albuterol usage.  His respiratory symptoms are variable, on some days he can walk a mile and a half with the dogs in the spine, and other times he is short of breath just walking across the room.  He will also get short of breath with just prolonged speaking.  Other things that make his breathing worse include cold weather, and humid weather.  We have albuterol no longer seems to help him.  Albuterol caused palpitations and with his PVCs, cannot be tolerated anymore.    Upon further questioning, he is admitting to some muscle fatigue and pain.  He reports his muscles feel worn out and drained like he had been exerting himself for a really long time.  This does not happen every day, but happens 2 to 3 days/week, and seems to be random.    Finally, he complains of a chronic cough occurring for hours a day every day.  This is a problem that continues to worsen with time.  He reports difficulty expectorating phlegm.    Review of Systems   Constitutional:  Negative for appetite change and fever.   HENT:  Positive for postnasal drip. Negative for ear pain, rhinorrhea, sneezing, sore throat and trouble swallowing.    Respiratory:  Positive for cough and shortness of breath.    Cardiovascular:  Negative for chest pain.   Musculoskeletal:  Positive for myalgias.   Neurological:  Positive  for weakness and headaches.     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 MA[facility];  Service: Orthopedics   • VASECTOMY      VasDeferens   • WISDOM TOOTH EXTRACTION       Family History   Problem Relation Age of Onset   • Anxiety disorder Mother    • Hypertension Father    • Hyperlipidemia Father    • No Known Problems Sister    • No Known Problems Sister    • No Known Problems Son    • No Known Problems Son    • No Known Problems Daughter    • Anxiety disorder Family    • Heart disease Family    • Stroke Family        Meds/Allergies     Current Outpatient Medications:   •  Breztri Aerosphere 160-9-4.8 MCG/ACT AERO, INHALE 2 INHALATIONS BY MOUTH  TWICE DAILY RINSE MOUTH AFTER  USE, Disp: 32.1 g, Rfl: 3  •  diphenhydrAMINE (BENADRYL) 25 mg tablet, Take 25 mg by mouth as needed, Disp: , Rfl:   •  ezetimibe (ZETIA) 10 mg tablet, TAKE 1 TABLET BY MOUTH DAILY, Disp: 90 tablet, Rfl: 1  •  levalbuterol (Xopenex HFA) 45 mcg/act inhaler, Inhale 1-2 puffs every 4 (four) hours as needed for wheezing, Disp: 15 g, Rfl: 3  •  losartan (COZAAR) 50 mg tablet, TAKE 1 TABLET BY MOUTH DAILY AT  BEDTIME, Disp: 90 tablet, Rfl: 1  •  meloxicam (MOBIC) 15 mg tablet, Take 1 tablet (15 mg total) by mouth daily as needed for moderate pain, Disp: 60 tablet, Rfl: 0  •  multivitamin (THERAGRAN) TABS, Take 1 tablet by mouth daily,  "Disp: , Rfl:   •  Triamcinolone Acetonide (Nasacort Allergy) 55 MCG/ACT nasal spray, into each nostril, Disp: , Rfl:   •  triamterene-hydrochlorothiazide (MAXZIDE-25) 37.5-25 mg per tablet, Take 1 tablet by mouth daily, Disp: 90 tablet, Rfl: 1  Allergies   Allergen Reactions   • Budesonide-Formoterol Fumarate Other (See Comments)     Hair loss   • Rosuvastatin Myalgia       Vitals: Blood pressure 122/78, pulse 74, temperature 98 °F (36.7 °C), temperature source Temporal, resp. rate 12, height 5' 9\" (1.753 m), weight 78.7 kg (173 lb 6.4 oz), SpO2 96%. Body mass index is 25.61 kg/m². Oxygen Therapy  SpO2: 96 %    Physical Exam  Physical Exam  Vitals reviewed.   Constitutional:       General: He is not in acute distress.     Appearance: Normal appearance. He is well-developed. He is not ill-appearing.   HENT:      Head: Normocephalic and atraumatic.   Eyes:      General: No scleral icterus.     Conjunctiva/sclera: Conjunctivae normal.   Neck:      Thyroid: No thyromegaly.      Vascular: No JVD.   Cardiovascular:      Rate and Rhythm: Normal rate and regular rhythm.      Heart sounds: Normal heart sounds. No murmur heard.     No friction rub. No gallop.   Pulmonary:      Effort: Pulmonary effort is normal. No respiratory distress.      Breath sounds: Normal breath sounds. No wheezing or rales.   Musculoskeletal:      Cervical back: Neck supple.      Right lower leg: No edema.      Left lower leg: No edema.   Skin:     General: Skin is warm and dry.      Findings: No rash.   Neurological:      General: No focal deficit present.      Mental Status: He is alert and oriented to person, place, and time. Mental status is at baseline.      Motor: Tremor (in bilateral hands) present.   Psychiatric:         Mood and Affect: Mood normal.         Behavior: Behavior normal.     Labs: I have personally reviewed pertinent lab results.  Lab Results   Component Value Date    WBC 7.63 12/31/2024    HGB 16.8 12/31/2024    HCT 48.7 " 12/31/2024    MCV 97 12/31/2024     12/31/2024     Lab Results   Component Value Date    CALCIUM 9.3 11/04/2024    K 4.2 11/04/2024    CO2 30 11/04/2024     11/04/2024    BUN 16 11/04/2024    CREATININE 0.98 11/04/2024     Lab Results   Component Value Date    IGE 38.3 05/09/2023     Lab Results   Component Value Date    ALT 16 11/04/2024    AST 19 11/04/2024    ALKPHOS 67 11/04/2024       Imaging and other studies: I have personally reviewed relevant films in PACS.    EKG, Pathology, and Other Studies: I have personally reviewed relevant reports in Epic.    Crispin Ratliff M.D.  [facility]

## 2025-03-31 NOTE — ASSESSMENT & PLAN NOTE
Compliance report reviewed.  Remains on AutoPap, pressure 4-12, max pressure 12, median pressure 9.4, AHI 2.0.  Leak is on average 4.2 L/min.  Continue current therapy which remains medically necessary for treatment of his obstructive sleep apnea.

## 2025-04-01 LAB
A ALTERNATA IGE QN: <0.1 KUA/I (ref 0–0.1)
A FUMIGATUS IGE QN: <0.1 KUA/I (ref 0–0.1)
BERMUDA GRASS IGE QN: <0.1 KUA/I (ref 0–0.1)
BOXELDER IGE QN: <0.1 KUA/I (ref 0–0.1)
C HERBARUM IGE QN: <0.1 KUA/I (ref 0–0.1)
CAT DANDER IGE QN: <0.1 KUA/I (ref 0–0.1)
CK SERPL-CCNC: 127 U/L (ref 39–308)
CMN PIGWEED IGE QN: <0.1 KUA/I (ref 0–0.1)
COMMON RAGWEED IGE QN: 1.57 KUA/I (ref 0–0.1)
COTTONWOOD IGE QN: <0.1 KUA/I (ref 0–0.1)
CRP SERPL QL: 1.9 MG/L
D FARINAE IGE QN: <0.1 KUA/I (ref 0–0.1)
D PTERONYSS IGE QN: <0.1 KUA/I (ref 0–0.1)
DOG DANDER IGE QN: <0.1 KUA/I (ref 0–0.1)
ERYTHROCYTE [SEDIMENTATION RATE] IN BLOOD: 13 MM/HOUR (ref 0–19)
LONDON PLANE IGE QN: <0.1 KUA/I (ref 0–0.1)
MOUSE URINE PROT IGE QN: <0.1 KUA/I (ref 0–0.1)
MT JUNIPER IGE QN: <0.1 KUA/I (ref 0–0.1)
MUGWORT IGE QN: <0.1 KUA/I (ref 0–0.1)
P NOTATUM IGE QN: <0.1 KUA/I (ref 0–0.1)
ROACH IGE QN: <0.1 KUA/I (ref 0–0.1)
SHEEP SORREL IGE QN: <0.1 KUA/I (ref 0–0.1)
SILVER BIRCH IGE QN: <0.1 KUA/I (ref 0–0.1)
TIMOTHY IGE QN: <0.1 KUA/I (ref 0–0.1)
TOTAL IGE SMQN RAST: 9.45 KU/L (ref 0–113)
WALNUT IGE QN: <0.1 KUA/I (ref 0–0.1)
WHITE ASH IGE QN: <0.1 KUA/I (ref 0–0.1)
WHITE ELM IGE QN: <0.1 KUA/I (ref 0–0.1)
WHITE MULBERRY IGE QN: <0.1 KUA/I (ref 0–0.1)
WHITE OAK IGE QN: <0.1 KUA/I (ref 0–0.1)

## 2025-04-02 ENCOUNTER — RESULTS FOLLOW-UP (OUTPATIENT)
Dept: CARDIOLOGY CLINIC | Facility: CLINIC | Age: 60
End: 2025-04-02

## 2025-04-02 LAB
ALDOLASE SERPL-CCNC: 5 U/L (ref 3.3–10.3)
DSDNA IGG SERPL IA-ACNC: <0.9 IU/ML (ref ?–15)
NUCLEAR IGG SER IA-RTO: 0.2 RATIO (ref ?–1)

## 2025-04-02 NOTE — RESULT ENCOUNTER NOTE
Calcium score is fairly low.   This implies there is very mild hardening of the heart arteries noted.  Stress test planned next week to assess blood flow to the heart.  Risk can be lowered with lifestyle and risk factor modification including regular exercise, weight management, cholesterol control and Mediterranean style plant-based diet.  Continue Zetia.  Statins will be helpful even if taken 3 times a week.

## 2025-04-03 ENCOUNTER — TELEPHONE (OUTPATIENT)
Age: 60
End: 2025-04-03

## 2025-04-03 NOTE — TELEPHONE ENCOUNTER
Pt rescheduled colonoscopy for 5/12/25 w/ Dr. Posey at the [facility].

## 2025-04-07 ENCOUNTER — HOSPITAL ENCOUNTER (OUTPATIENT)
Dept: PULMONOLOGY | Facility: HOSPITAL | Age: 60
Discharge: HOME | End: 2025-04-07
Attending: INTERNAL MEDICINE
Payer: COMMERCIAL

## 2025-04-07 DIAGNOSIS — J45.50 SEVERE PERSISTENT ASTHMA WITHOUT COMPLICATION: ICD-10-CM

## 2025-04-07 PROCEDURE — 94726 PLETHYSMOGRAPHY LUNG VOLUMES: CPT | Performed by: INTERNAL MEDICINE

## 2025-04-07 PROCEDURE — 94760 N-INVAS EAR/PLS OXIMETRY 1: CPT

## 2025-04-07 PROCEDURE — 94726 PLETHYSMOGRAPHY LUNG VOLUMES: CPT

## 2025-04-07 PROCEDURE — 94060 EVALUATION OF WHEEZING: CPT | Performed by: INTERNAL MEDICINE

## 2025-04-07 PROCEDURE — 94060 EVALUATION OF WHEEZING: CPT

## 2025-04-07 PROCEDURE — 94729 DIFFUSING CAPACITY: CPT

## 2025-04-07 PROCEDURE — 94729 DIFFUSING CAPACITY: CPT | Performed by: INTERNAL MEDICINE

## 2025-04-07 RX ORDER — LEVALBUTEROL 1.25 MG/.5ML
1.25 SOLUTION, CONCENTRATE RESPIRATORY (INHALATION) ONCE
Status: COMPLETED | OUTPATIENT
Start: 2025-04-07 | End: 2025-04-07

## 2025-04-07 RX ORDER — SODIUM CHLORIDE FOR INHALATION 0.9 %
3 VIAL, NEBULIZER (ML) INHALATION ONCE
Status: COMPLETED | OUTPATIENT
Start: 2025-04-07 | End: 2025-04-07

## 2025-04-07 RX ORDER — ALBUTEROL SULFATE 0.83 MG/ML
2.5 SOLUTION RESPIRATORY (INHALATION) ONCE
Status: DISCONTINUED | OUTPATIENT
Start: 2025-04-07 | End: 2025-04-07

## 2025-04-07 RX ADMIN — ISODIUM CHLORIDE 3 ML: 0.03 SOLUTION RESPIRATORY (INHALATION) at 07:42

## 2025-04-07 RX ADMIN — LEVALBUTEROL 1.25 MG: 1.25 SOLUTION, CONCENTRATE RESPIRATORY (INHALATION) at 07:42

## 2025-04-08 LAB
ANCA AB SER QL: NEGATIVE
MYELOPEROXIDASE AB SER IA-ACNC: <1 AI
PROTEINASE3 AB SER IA-ACNC: <1 AI

## 2025-04-09 ENCOUNTER — HOSPITAL ENCOUNTER (OUTPATIENT)
Dept: NON INVASIVE DIAGNOSTICS | Facility: HOSPITAL | Age: 60
Discharge: HOME | End: 2025-04-09
Attending: INTERNAL MEDICINE
Payer: COMMERCIAL

## 2025-04-09 VITALS — SYSTOLIC BLOOD PRESSURE: 110 MMHG | OXYGEN SATURATION: 97 % | HEART RATE: 93 BPM | DIASTOLIC BLOOD PRESSURE: 64 MMHG

## 2025-04-09 DIAGNOSIS — I49.3 PVC (PREMATURE VENTRICULAR CONTRACTION): ICD-10-CM

## 2025-04-09 DIAGNOSIS — R07.2 PRECORDIAL PAIN: ICD-10-CM

## 2025-04-09 LAB
CHEST PAIN STATEMENT: NORMAL
MAX DIASTOLIC BP: 70 MMHG
MAX HR PERCENT: 95 %
MAX HR: 153 BPM
MAX PREDICTED HEART RATE: 160 BPM
PROTOCOL NAME: NORMAL
RATE PRESSURE PRODUCT: NORMAL
REASON FOR TERMINATION: NORMAL
SL CV LV EF: 60
SL CV STRESS RECOVERY BP: NORMAL MMHG
SL CV STRESS RECOVERY HR: 96 BPM
SL CV STRESS RECOVERY O2 SAT: 96 %
SL CV STRESS STAGE REACHED: 3
STRESS ANGINA INDEX: 0
STRESS BASELINE BP: NORMAL MMHG
STRESS BASELINE HR: 93 BPM
STRESS O2 SAT REST: 97 %
STRESS PEAK HR: 153 BPM
STRESS POST ESTIMATED WORKLOAD: 10.4 METS
STRESS POST EXERCISE DUR MIN: 8 MIN
STRESS POST EXERCISE DUR MIN: 8 MIN
STRESS POST EXERCISE DUR SEC: 0 SEC
STRESS POST EXERCISE DUR SEC: 0 SEC
STRESS POST O2 SAT PEAK: 98 %
STRESS POST PEAK BP: 146 MMHG
STRESS POST PEAK HR: 153 BPM
STRESS POST PEAK SYSTOLIC BP: 146 MMHG
TARGET HR FORMULA: NORMAL
TEST INDICATION: NORMAL

## 2025-04-09 PROCEDURE — 93350 STRESS TTE ONLY: CPT | Performed by: INTERNAL MEDICINE

## 2025-04-09 PROCEDURE — 93350 STRESS TTE ONLY: CPT

## 2025-04-09 NOTE — RESULT ENCOUNTER NOTE
Called pt to give exercise stress ECHO results. Pt understood. No questions on results. Advised pt to keep appt with you on 4/22 since he is still feeling fatigued. Pt understood.

## 2025-04-09 NOTE — RESULT ENCOUNTER NOTE
Exercise stress echo showed no significant abnormalities and these results are reassuring.  Please call patient with normal results on the stress test.

## 2025-04-10 ENCOUNTER — RESULTS FOLLOW-UP (OUTPATIENT)
Dept: PULMONOLOGY | Facility: CLINIC | Age: 60
End: 2025-04-10

## 2025-04-10 DIAGNOSIS — J98.4 RESTRICTIVE LUNG DISEASE: ICD-10-CM

## 2025-04-10 DIAGNOSIS — M62.81 MUSCLE WEAKNESS: Primary | ICD-10-CM

## 2025-04-20 NOTE — RESULT ENCOUNTER NOTE
Event monitor showed that 6.6% of beats were PVCs  EP evaluation is already planned with Dr. Van to discuss management.  Please call patient with test results.

## 2025-05-01 ENCOUNTER — CONSULT (OUTPATIENT)
Dept: CARDIOLOGY CLINIC | Facility: CLINIC | Age: 60
End: 2025-05-01
Payer: COMMERCIAL

## 2025-05-01 VITALS
DIASTOLIC BLOOD PRESSURE: 76 MMHG | WEIGHT: 171.1 LBS | HEIGHT: 69 IN | SYSTOLIC BLOOD PRESSURE: 120 MMHG | HEART RATE: 63 BPM | BODY MASS INDEX: 25.34 KG/M2

## 2025-05-01 DIAGNOSIS — R00.2 PALPITATIONS: Primary | ICD-10-CM

## 2025-05-01 DIAGNOSIS — I49.3 PVC (PREMATURE VENTRICULAR CONTRACTION): ICD-10-CM

## 2025-05-01 DIAGNOSIS — J45.50 SEVERE PERSISTENT ASTHMA WITHOUT COMPLICATION: ICD-10-CM

## 2025-05-01 DIAGNOSIS — G47.33 OSA (OBSTRUCTIVE SLEEP APNEA): ICD-10-CM

## 2025-05-01 DIAGNOSIS — I10 ESSENTIAL HYPERTENSION: ICD-10-CM

## 2025-05-01 LAB
ATRIAL RATE: 63 BPM
P AXIS: 56 DEGREES
PR INTERVAL: 164 MS
QRS AXIS: -27 DEGREES
QRSD INTERVAL: 74 MS
QT INTERVAL: 372 MS
QTC INTERVAL: 381 MS
T WAVE AXIS: 65 DEGREES
VENTRICULAR RATE: 63 BPM

## 2025-05-01 PROCEDURE — 99214 OFFICE O/P EST MOD 30 MIN: CPT | Performed by: STUDENT IN AN ORGANIZED HEALTH CARE EDUCATION/TRAINING PROGRAM

## 2025-05-01 PROCEDURE — 93000 ELECTROCARDIOGRAM COMPLETE: CPT | Performed by: STUDENT IN AN ORGANIZED HEALTH CARE EDUCATION/TRAINING PROGRAM

## 2025-05-01 NOTE — PATIENT INSTRUCTIONS
Options:      Flecainide twice daily pill - yearly stress tests  Ablation  Loop recorder for long term monitoring

## 2025-05-01 NOTE — PROGRESS NOTES
"HEART AND VASCULAR  CARDIAC ELECTROPHYSIOLOGY   HEART RHYTHM CENTER  [facility]    Outpatient New Consult    Today's Date: 05/01/25        Patient name: Sean Thomas  YOB: 1965  Sex: male         Chief Complaint: PVCs, symptomatic      ASSESSMENT:  Problem List Items Addressed This Visit       Essential hypertension    Severe persistent asthma without complication    Palpitations - Primary    Relevant Orders    POCT ECG    ALEX (obstructive sleep apnea)     Other Visit Diagnoses         PVC (premature ventricular contraction)        Relevant Orders    POCT ECG                  PLAN:  PVCs, palpitations  - Symptomatic  - [place] 6% on recent monitor  - Discussed flecainide, ablation  - Also discussed possible long term monitoring  - Patient will consider his options and let us know    History of ALEX  - CPAP compliance confirmed    Severe persistent asthma without complication  - Per recent pulmonology note poor control.  - Continue current therapies  - Continue to follow with pulmonology    Hypertension  - Blood pressure today 120/76mmHg  - Continue losartan 50 mg daily      Follow up in: 6-8 months    Orders Placed This Encounter   Procedures    POCT ECG     There are no discontinued medications.      .............................................................................................    HPI/Subjective:     Mr. Sean Thomas is a 59-year-old gentleman with past medical history of severe persistent asthma, pure hypercholesterolemia, and PVCs.  With regards to his PVCs, he noted he was extremely symptomatic.  PVC burden 6% on recent ZIO monitor.  Echo stress performed revealing no ischemia.    He was seen in cardiology where beta-blocker was offered but he declined.    Today presents to establish care electrophysiology.  He notes that his symptoms when having PVCs are very bothersome.  He feels as if he \"loads up\" and then has a small explosion in his chest.  We " discussed that while wearing his Zio monitor he had an extremely low burden of PVCs.  I also noted that while he has a similar sensation currently and had that sensation while he was getting his EKG, he had no PVCs during the time of his EKG.  It is of note however that t his symptoms did seem to correspond to his PVC timing on ZIO.    EKG reviewed revealing PVCs are coming from the RVOT possibly anterior septal region.    Patient's wife was present.  She noted that he both he and her have not felt well since kari dengue fever in [facility].  There is then a question of if he had COVID which led to worsening of his respiratory status.  He continues to have issues with severe persistent asthma.    We discussed options for management.  He had a negative stress test so flecainide would be an option.  We also discussed that ablation is an option, but that if he is not having PVCs at the time of his ablation, we will not be able to perform the procedure.  Lastly we discussed possibility of performing a loop recorder implant so that long-term monitoring and better correlation with symptoms could be performed.    In the end, the patient we will likely proceed with EP study and ablation.  He and his wife will discuss options when they leave, and contact us when they have made the decision.      Complete 12 point ROS reviewed and otherwise non pertinent or negative except as per HPI pertinent positives in Cardiovascular and Respiratory emphasized. Please see paper chart for outpatient clinic patients where the patient completed the 12 point ROS survey.           Past Medical History:   Diagnosis Date    Allergic     Anxiety     Asthma     CPAP (continuous positive airway pressure) dependence     Dengue fever 04/08/2024    Depression     Diverticulitis of colon     Dyspnea on exertion 01/20/2022    Heart murmur     Hyperlipidemia     Hypertension     Pneumonia 03/2016    Precordial pain 01/18/2022    Sleep apnea         Allergies   Allergen Reactions    Budesonide-Formoterol Fumarate Other (See Comments)     Hair loss    Rosuvastatin Myalgia     I reviewed the Home Medication list and Allergies in the chart.   Scheduled Meds:  Current Outpatient Medications   Medication Sig Dispense Refill    Breztri Aerosphere 160-9-4.8 MCG/ACT AERO INHALE 2 INHALATIONS BY MOUTH  TWICE DAILY RINSE MOUTH AFTER  USE 32.1 g 3    diphenhydrAMINE (BENADRYL) 25 mg tablet Take 25 mg by mouth as needed      ezetimibe (ZETIA) 10 mg tablet TAKE 1 TABLET BY MOUTH DAILY 90 tablet 1    levalbuterol (Xopenex HFA) 45 mcg/act inhaler Inhale 1-2 puffs every 4 (four) hours as needed for wheezing 15 g 3    losartan (COZAAR) 50 mg tablet TAKE 1 TABLET BY MOUTH DAILY AT  BEDTIME 90 tablet 1    meloxicam (MOBIC) 15 mg tablet Take 1 tablet (15 mg total) by mouth daily as needed for moderate pain 60 tablet 0    multivitamin (THERAGRAN) TABS Take 1 tablet by mouth daily      Triamcinolone Acetonide (Nasacort Allergy) 55 MCG/ACT nasal spray into each nostril      triamterene-hydrochlorothiazide (MAXZIDE-25) 37.5-25 mg per tablet Take 1 tablet by mouth daily 90 tablet 1     No current facility-administered medications for this visit.     PRN Meds:.        Family History   Problem Relation Age of Onset    Anxiety disorder Mother     Hypertension Father     Hyperlipidemia Father     No Known Problems Sister     No Known Problems Sister     No Known Problems Son     No Known Problems Son     No Known Problems Daughter     Anxiety disorder Family     Heart disease Family     Stroke Family        Social History     Socioeconomic History    Marital status: /Civil Union     Spouse name: Not on file    Number of children: Not on file    Years of education: Not on file    Highest education level: Not on file   Occupational History    Not on file   Tobacco Use    Smoking status: Never    Smokeless tobacco: Never   Vaping Use    Vaping status: Never Used   Substance and  "Sexual Activity    Alcohol use: Yes     Alcohol/week: 7.0 standard drinks of alcohol     Types: 7 Glasses of wine per week     Comment: one glass wine per day    Drug use: Never    Sexual activity: Yes     Partners: Female     Birth control/protection: Male Sterilization   Other Topics Concern    Not on file   Social History Narrative    Caffeine Use     Social Drivers of Health     Financial Resource Strain: Not on file   Food Insecurity: Not on file   Transportation Needs: Not on file   Physical Activity: Not on file   Stress: Not on file   Social Connections: Not on file   Intimate Partner Violence: Not on file   Housing Stability: Not on file         OBJECTIVE:    /76 (BP Location: Left arm, Patient Position: Sitting, Cuff Size: Standard)   Pulse 63   Ht 5' 9\" (1.753 m)   Wt 77.6 kg (171 lb 1.6 oz)   BMI 25.27 kg/m²   Vitals:    05/01/25 1454   Weight: 77.6 kg (171 lb 1.6 oz)     GEN: No acute distress, Alert and oriented, well appearing  HEENT: Normocephalic, atraumatic  CARDIOVASCULAR:  RRR, No murmur, rub, gallops S1,S2  LUNGS: Clear To auscultation bilaterally, normal effort, no rales, rhonchi, crackles   ABDOMEN:  nondistended,  without obvious organomegaly or ascites  EXTREMITIES/VASCULAR:  No edema. warm an well perfused.  PSYCH: Normal Affect,  linear speech pattern without evidence of psychosis.   NEURO: Grossly intact, moving all extremiteis equal, face symmetric, alert and responsive, no obvious focal defecits   GAIT:  Ambulates normally without difficulty  HEME: No bleeding, bruising, petechia, purpura   SKIN: No significant rashes on visibile skin, warm, no diaphoresis or pallor.     Lab Results:       LABS:      Chemistry        Component Value Date/Time    K 4.2 11/04/2024 0732     11/04/2024 0732    CO2 30 11/04/2024 0732    BUN 16 11/04/2024 0732    CREATININE 0.98 11/04/2024 0732        Component Value Date/Time    CALCIUM 9.3 11/04/2024 0732    ALKPHOS 67 11/04/2024 0732    AST " "19 11/04/2024 0732    ALT 16 11/04/2024 0732            No results found for: \"CHOL\"  Lab Results   Component Value Date    HDL 48 11/04/2024    HDL 50 09/18/2023    HDL 51 09/19/2022     Lab Results   Component Value Date    LDLCALC 124 (H) 11/04/2024    LDLCALC 152 (H) 09/18/2023    LDLCALC 137 (H) 09/19/2022     Lab Results   Component Value Date    TRIG 154 (H) 11/04/2024    TRIG 217 (H) 09/18/2023    TRIG 167 (H) 09/19/2022     No results found for: \"CHOLHDL\"    IMAGING: Echo stress test, exercise  Result Date: 4/9/2025  Narrative:   Left Ventricle: Left ventricular cavity size is normal. The left ventricular ejection fraction is 60%. Systolic function is normal. Wall motion is normal.   Stress ECG: Arrhythmias during stress: frequent PVCs. The stress ECG is negative for ischemia after maximal exercise, without reproduction of symptoms.   Peak Stress Echo: Left ventricle cavity has normal reduction in size at peak stress. The left ventricle systolic function is normal at peak stress. The peak stress echo showed normal wall motion.   Echo Post Impression: The study is normal. Presence of frequent PVCs was noted throughout the study.     Complete PFT with post bronchodilator  Result Date: 4/9/2025  Narrative: Images from the original result were not included. Pulmonary Function Test Report Sean Thomas 59 y.o. male MRN: [id number] Date Performed: 4/7/25 Date Interpretation: 4/9/2025 Interpretation: Normal spirometry. No post bronchodilator response. Normal lung volumes. Normal corrected diffusion capacity. Normal flow-volume loop Muscle forces: reduced MVV and MEP, normal MIP. Correlate clinically Glenda Snell MD [facility]     Stress strip  Result Date: 4/9/2025  Narrative: Confirmed by RASHIDA HERNANDEZ ([id number]),  Marina Hull ([id number]) on 4/9/2025 9:54:11 AM       Cardiac testing:   No results found for this or any previous visit.    No results found for this " or any previous visit.    No results found for this or any previous visit.    No results found for this or any previous visit.          I reviewed and interpreted the following LABS/EKG/TELE/IMAGING and below is summary of my interpretation (if data available):    LABS:     Current EKG - Normal sinus rhythm without PVCs    Stress test 4/9/25    Left Ventricle: Left ventricular cavity size is normal. The left ventricular ejection fraction is 60%. Systolic function is normal. Wall motion is normal.    Stress ECG: Arrhythmias during stress: frequent PVCs. The stress ECG is negative for ischemia after maximal exercise, without reproduction of symptoms.    Peak Stress Echo: Left ventricle cavity has normal reduction in size at peak stress. The left ventricle systolic function is normal at peak stress. The peak stress echo showed normal wall motion.    Echo Post Impression: The study is normal. Presence of frequent PVCs was noted throughout the study.    Zio monitor 3/20/25  Patient had a min HR of 48 bpm, max HR of 169 bpm, and avg HR of 79 bpm. Predominant underlying rhythm was Sinus Rhythm. 3 Supraventricular Tachycardia runs occurred, the run with the fastest interval lasting 11.5 secs with a max rate of 169 bpm (avg 144 bpm); the run with the fastest interval was also the longest. Some episodes of Supraventricular Tachycardia may be possible Atrial Tachycardia with variable block. Supraventricular Tachycardia and PVCs were detected within +/- 45 seconds of symptomatic patient event(s). Isolated SVEs were rare (<1.0%), SVE Couplets were rare (<1.0%), and SVE Triplets were rare (<1.0%). Isolated VEs were frequent (6.6%, 98999), VE Couplets were rare (<1.0%, 168), and VE Triplets were rare (<1.0%, 14). Ventricular Bigeminy and Trigeminy were present.     ECHO 3/14/25      Left Ventricle: Left ventricular cavity size is normal. Wall thickness is normal. The left ventricular ejection fraction is 55%. Systolic function is  normal. Wall motion is normal. Diastolic function is normal.    Right Ventricle: Right ventricle is not well visualized. Systolic function is normal.    Left Atrium: The atrium is normal in size.    Right Atrium: The atrium is normal in size.    Aortic Valve: The aortic valve is trileaflet. The leaflets exhibit normal mobility. There is no evidence of regurgitation. The aortic valve has no significant stenosis.    Mitral Valve: Mitral valve structure is normal. The leaflets exhibit normal mobility. There is trace regurgitation.    Tricuspid Valve: Tricuspid valve structure is normal. The leaflets exhibit normal mobility. There is trace regurgitation.    IVC/SVC: The inferior vena cava is normal in size.

## 2025-05-08 ENCOUNTER — OFFICE VISIT (OUTPATIENT)
Dept: PULMONOLOGY | Facility: CLINIC | Age: 60
End: 2025-05-08
Payer: COMMERCIAL

## 2025-05-08 VITALS
HEART RATE: 64 BPM | DIASTOLIC BLOOD PRESSURE: 70 MMHG | OXYGEN SATURATION: 99 % | WEIGHT: 171 LBS | TEMPERATURE: 97.3 F | SYSTOLIC BLOOD PRESSURE: 122 MMHG | BODY MASS INDEX: 25.25 KG/M2

## 2025-05-08 DIAGNOSIS — G47.33 OSA (OBSTRUCTIVE SLEEP APNEA): ICD-10-CM

## 2025-05-08 DIAGNOSIS — J45.50 SEVERE PERSISTENT ASTHMA WITHOUT COMPLICATION: Primary | ICD-10-CM

## 2025-05-08 PROCEDURE — 99214 OFFICE O/P EST MOD 30 MIN: CPT | Performed by: INTERNAL MEDICINE

## 2025-05-08 RX ORDER — MONTELUKAST SODIUM 10 MG/1
10 TABLET ORAL
Qty: 30 TABLET | Refills: 1 | Status: SHIPPED | OUTPATIENT
Start: 2025-05-08

## 2025-05-08 NOTE — ASSESSMENT & PLAN NOTE
Compliance is generally appropriate, 77% of days with > 4 hours usage.  AHI 1.5.  No significant leak.  Continue current therapy.

## 2025-05-08 NOTE — ASSESSMENT & PLAN NOTE
Crescencio has had some improvements with the change in weather.  [employer] approval is still pending.    Continue Breztri  Continue Xopenex as needed  Add singulair given concomitant sinus disease  Continue nasacort

## 2025-05-08 NOTE — PROGRESS NOTES
Pulmonary Follow Up Note   Sean Thomas 59 y.o. male MRN: [id number]  5/8/2025    Assessment:    Severe persistent asthma without complication  C[place]cencio has had some improvements with the change in weather.  [employer] approval is still pending.    Continue Breztri  Continue Xopenex as needed  Add singulair given concomitant sinus disease  Continue nasacort    ALEX (obstructive sleep apnea)  Compliance is generally appropriate, 77% of days with > 4 hours usage.  AHI 1.5.  No significant leak.  Continue current therapy.    Plan:    Diagnoses and all orders for this visit:    Severe persistent asthma without complication  -     montelukast (SINGULAIR) 10 mg tablet; Take 1 tablet (10 mg total) by mouth daily at bedtime    ALEX (obstructive sleep apnea)    Return in about 3 months (around 8/8/2025).    History of Present Illness   HPI:  Sean Thomas is a 59 y.o. male who presents for short term follow up.  He's doing a bit better.  He has less burning with breathing but this may be more attributable to the change in weather.  Symptoms of cough, mucus production, and dyspnea with prolonged conversation as well as vocal weakening, which were severe and present every day, are now less common, occurring to a severe degree more every-other-day.  He still complains of generalized pain and weakness, and reports that when the fatigue and weakness settle in, nothing improves things other than resting.  Xopenex is at best minimally helpful.    PFTs did suggest some muscular weakness particularly with forced expiration.  There was not fixed obstruction to attribute that to uncontrolled asthma, although he did note he felt much worse being off of Breztri for 2 days.    He was supposed to fill out a form for [employer] but had forgotten about it, and completed it in-office today.    Answers submitted by the patient for this visit:  Pulmonology Questionnaire (Submitted on 5/7/2025)  Chief Complaint: Primary symptoms  Do you have a  cough?: Yes  Do you have difficulty breathing?: Yes  Do you experience frequent throat clearing?: Yes  Do you have a hoarse voice?: Yes  Do you have a wet cough?: Yes  Chronicity: chronic  When did you first notice your symptoms?: more than 1 month ago  How often do your symptoms occur?: daily  Since you first noticed this problem, how has it changed?: waxing and waning  Have you had a change in appetite?: No  Do you have chest pain?: No  Do you have shortness of breath that occurs with effort or exertion?: Yes  Do you have ear congestion?: No  Do you have ear pain?: No  Do you have a fever?: No  Do you have headaches?: Yes  Do you have heartburn?: No  Do you have fatigue?: Yes  Do you have muscle pain?: Yes  Do you have nasal congestion?: Yes  Do you have shortness of breath when lying flat?: No  Do you have shortness of breath when you wake up?: No  Do you have post-nasal drip?: Yes  Do you have a runny nose?: No  Do you have sneezing?: No  Do you have sweats?: No  Do you have trouble swallowing?: No  Have you experienced weight loss?: No  Which of the following makes your symptoms worse?: change in weather, emotional stress, exposure to fumes, exposure to smoke, pollen, strenuous activity  Which of the following makes your symptoms better?: rest  Risk factors for lung disease: animal exposure  xl  Review of Systems   Constitutional:  Negative for appetite change and fever.   HENT:  Positive for postnasal drip. Negative for ear pain, rhinorrhea, sneezing and trouble swallowing.    Respiratory:  Positive for cough and shortness of breath.    Cardiovascular:  Negative for chest pain.   Musculoskeletal:  Positive for myalgias.   Neurological:  Positive for weakness and headaches.     Historical Information   Past Medical History:   Diagnosis Date   • Allergic    • Anxiety    • Asthma    • CPAP (continuous positive airway pressure) dependence    • Dengue fever 04/08/2024   • Depression    • Diverticulitis of colon     • Dyspnea on exertion 01/20/2022   • Heart murmur    • Hyperlipidemia    • Hypertension    • Pneumonia 03/2016   • Precordial pain 01/18/2022   • Sleep apnea      Past Surgical History:   Procedure Laterality Date   • APPENDECTOMY     • COLONOSCOPY     • AK ARTHRS KNE SURG W/MENISCECTOMY MED/LAT W/SHVG Right 2/12/2024    Procedure: KNEE ARTHROSCOPIC PARTIAL MENISCECTOMY MEDIAL;  Surgeon: Morgan Cam DO;  [facility]: [facility] MAIN OR;  Service: Orthopedics   • VASECTOMY      VasDeferens   • WISDOM TOOTH EXTRACTION       Family History   Problem Relation Age of Onset   • Anxiety disorder Mother    • Hypertension Father    • Hyperlipidemia Father    • No Known Problems Sister    • No Known Problems Sister    • No Known Problems Son    • No Known Problems Son    • No Known Problems Daughter    • Anxiety disorder Family    • Heart disease Family    • Stroke Family        Meds/Allergies     Current Outpatient Medications:   •  Breztri Aerosphere 160-9-4.8 MCG/ACT AERO, INHALE 2 INHALATIONS BY MOUTH  TWICE DAILY RINSE MOUTH AFTER  USE, Disp: 32.1 g, Rfl: 3  •  diphenhydrAMINE (BENADRYL) 25 mg tablet, Take 25 mg by mouth as needed, Disp: , Rfl:   •  ezetimibe (ZETIA) 10 mg tablet, TAKE 1 TABLET BY MOUTH DAILY, Disp: 90 tablet, Rfl: 1  •  levalbuterol (Xopenex HFA) 45 mcg/act inhaler, Inhale 1-2 puffs every 4 (four) hours as needed for wheezing, Disp: 15 g, Rfl: 3  •  losartan (COZAAR) 50 mg tablet, TAKE 1 TABLET BY MOUTH DAILY AT  BEDTIME, Disp: 90 tablet, Rfl: 1  •  meloxicam (MOBIC) 15 mg tablet, Take 1 tablet (15 mg total) by mouth daily as needed for moderate pain, Disp: 60 tablet, Rfl: 0  •  montelukast (SINGULAIR) 10 mg tablet, Take 1 tablet (10 mg total) by mouth daily at bedtime, Disp: 30 tablet, Rfl: 1  •  multivitamin (THERAGRAN) TABS, Take 1 tablet by mouth daily, Disp: , Rfl:   •  Triamcinolone Acetonide (Nasacort Allergy) 55 MCG/ACT nasal spray, into each nostril, Disp: , Rfl:   •   triamterene-hydrochlorothiazide (MAXZIDE-25) 37.5-25 mg per tablet, Take 1 tablet by mouth daily, Disp: 90 tablet, Rfl: 1  Allergies   Allergen Reactions   • Budesonide-Formoterol Fumarate Other (See Comments)     Hair loss   • Rosuvastatin Myalgia       Vitals: Blood pressure 122/70, pulse 64, temperature (!) 97.3 °F (36.3 °C), temperature source Temporal, weight 77.6 kg (171 lb), SpO2 99%. Body mass index is 25.25 kg/m². Oxygen Therapy  SpO2: 99 %    Physical Exam  Physical Exam  Vitals reviewed.   Constitutional:       General: He is not in acute distress.     Appearance: Normal appearance. He is well-developed. He is not ill-appearing.   HENT:      Head: Normocephalic and atraumatic.   Eyes:      General: No scleral icterus.     Conjunctiva/sclera: Conjunctivae normal.   Neck:      Thyroid: No thyromegaly.      Vascular: No JVD.   Cardiovascular:      Rate and Rhythm: Normal rate and regular rhythm.      Heart sounds: Normal heart sounds. No murmur heard.     No friction rub. No gallop.   Pulmonary:      Effort: Pulmonary effort is normal. No respiratory distress.      Breath sounds: Normal breath sounds. No wheezing or rales.   Musculoskeletal:      Cervical back: Neck supple.      Right lower leg: No edema.      Left lower leg: No edema.   Skin:     General: Skin is warm and dry.      Findings: No rash.   Neurological:      General: No focal deficit present.      Mental Status: He is alert and oriented to person, place, and time. Mental status is at baseline.   Psychiatric:         Mood and Affect: Mood normal.         Behavior: Behavior normal.         Labs: I have personally reviewed pertinent lab results.  Lab Results   Component Value Date    WBC 7.63 12/31/2024    HGB 16.8 12/31/2024    HCT 48.7 12/31/2024    MCV 97 12/31/2024     12/31/2024     Lab Results   Component Value Date    CALCIUM 9.3 11/04/2024    K 4.2 11/04/2024    CO2 30 11/04/2024     11/04/2024    BUN 16 11/04/2024     CREATININE 0.98 11/04/2024     Lab Results   Component Value Date    IGE 9.45 03/31/2025     Lab Results   Component Value Date    ALT 16 11/04/2024    AST 19 11/04/2024    ALKPHOS 67 11/04/2024       Imaging and other studies: I have personally reviewed relevant films in PACS.    EKG, Pathology, and Other Studies: I have personally reviewed relevant reports in Epic.    Crispin Ratliff M.D.  [facility]

## 2025-05-14 ENCOUNTER — TELEPHONE (OUTPATIENT)
Dept: CARDIOLOGY CLINIC | Facility: CLINIC | Age: 60
End: 2025-05-14

## 2025-05-14 ENCOUNTER — PREP FOR PROCEDURE (OUTPATIENT)
Dept: CARDIOLOGY CLINIC | Facility: CLINIC | Age: 60
End: 2025-05-14

## 2025-05-14 DIAGNOSIS — R00.2 PALPITATIONS: Primary | ICD-10-CM

## 2025-05-14 DIAGNOSIS — I49.3 PVC (PREMATURE VENTRICULAR CONTRACTION): ICD-10-CM

## 2025-05-14 NOTE — TELEPHONE ENCOUNTER
Patient scheduled for  PVC ablation at [facility] on 06/20/2025  with Dr Van.  Patient aware of general instructions, labs test required.   Meds holds:   Losartan to hold 24hrs prior.  Triamtere/HCTZ to hold 24hrs prior.

## 2025-05-14 NOTE — TELEPHONE ENCOUNTER
----- Message from Zoran Van MD sent at 5/13/2025  5:19 PM EDT -----  Please schedule Mr. Thomas for a PVC ablation.  CARTO mapping system

## 2025-05-19 ENCOUNTER — PATIENT MESSAGE (OUTPATIENT)
Dept: PULMONOLOGY | Facility: CLINIC | Age: 60
End: 2025-05-19

## 2025-05-20 ENCOUNTER — OFFICE VISIT (OUTPATIENT)
Dept: NEUROLOGY | Facility: CLINIC | Age: 60
End: 2025-05-20
Attending: INTERNAL MEDICINE
Payer: COMMERCIAL

## 2025-05-20 VITALS
HEART RATE: 59 BPM | BODY MASS INDEX: 25.03 KG/M2 | DIASTOLIC BLOOD PRESSURE: 78 MMHG | WEIGHT: 169 LBS | HEIGHT: 69 IN | SYSTOLIC BLOOD PRESSURE: 126 MMHG | TEMPERATURE: 97.3 F | OXYGEN SATURATION: 98 %

## 2025-05-20 DIAGNOSIS — G12.29 BULBAR WEAKNESS (HCC): Primary | ICD-10-CM

## 2025-05-20 PROCEDURE — 99204 OFFICE O/P NEW MOD 45 MIN: CPT | Performed by: PSYCHIATRY & NEUROLOGY

## 2025-05-20 NOTE — PROGRESS NOTES
Name: Sean Thomas      : 1965      MRN: [id number]  Encounter Provider: Jones Pennington MD  Encounter Date: 2025   Encounter department: NEUROLOGY [facility]  :  Assessment & Plan  Bulbar weakness (HCC)  59y M w/ a PMH of anxiety, ALEX on CPAP, depression, HTN, HLD, asthma coming in for referral for pulmonary muscle weakness. He was referraed by pulmonology given PFTs showed some muscle weakness and was symptomatic and possibly not all due to asthma. Everything else was noted to be normal on his PFTs. He has noted that he has episodes where he has trouble breathing, lower volume of his speech, coughing fits where he feels phlegm in his throat and can be painful, lightheadeness. He stated he has extreme fatigue that lasted for 30 seconds and requires him to rest. He does note he has PVCs during these episodes which he is expected to get an ablation in 2025. He does have coughing fits where feels like phelgm coming up  He does have ptosis that started in early  and lasted for a few months and was not as noticeable. He does have this appreciable on his exam but otherwise his exam is normal w/o appreciable weakness. No lid lag appreciated   Neurologic disease in the family: mother has frontotemporal dementia     Past W/U:   - 25 Aldolase 5.0  - CK: 127  - BARRERA: 0.20 WNL  - ANCA: negative   - ESR: 13  - CRP: 1.9  - 24 TSH 1.324 WNL      Impression: noted bulbar weakness w/ voice changes, some coughing spells, and L ptosis requiring further w/u with things such as MG and appears less to be muscle disease or motor neuro disease       Plan   Staffed w/ Dr. Pennington at [facility] rotation   - will get following labs: ACH ab w/ reflex to MUSK, LRP4, Vit B12, Vit D - 1,25  - will get rep stim EMG/NCS  - f/u if results +      Orders:    Acetylcholine Receptor (AChR) Binding Antibodies Complete Panel With Reflex to MuSK Antibodies; Future    EMG Rep Stim; Future     MISCELLANEOUS LAB TEST; Future    Vitamin B12; Future    Vitamin D 1,25 Dihydroxy; Future          History of Present Illness   HPI   59y M w/ a PMH of anxiety, ALEX on CPAP, depression, HTN, HLD, asthma coming in for referral for pulmonary muscle weakness. He was referraed by pulmonology given PFTs showed some muscle weakness and was symptomatic and possibly not all due to asthma. Everything else was noted to be normal on his PFTs.       He states that his asthma is more seasonal and his voice would get weak as he was talking. He was having trouble breathing and having constant cough. At certain times when this would occur, he would get extremely weak where he would need to grab something to stabilize himself or need to sit down. It lasts for a few moments and then would pause. He has tried multiple asthma meds and thinks it's helping.   Gets fatigued/dizzy (moreso lightheadedness) when he has asthma attacks.   He has been having more frequent PVCs as well and notes then when he has his asthma attacks and the frequent PVCs, he would notice fatigue. He notices the PVC has been daily now the past several months.   He has an ablation in June 2025        New or worsening weakness? Yes, has severe weakness lasting 30 seconds and then feels weak afterwards and needs to take a nap for a few hours    - he notices the breathing and PVCs at that time   -  he does note that at the end of the day, he notices PVCs, breathing issues, and fatigue are worsened at the end of the day     Drooping eye lid: L eyelid ptosis since 2024 daily for a few months and went away.   Double vision: denies   Difficulty walking?unsure because he pauses when he's having trouble breathing    - usually better in the morning   - some more fatigue going up and down steps   Shortness of breath? Yes  Difficulty speaking? Yes, he feels when talking things are softer and not expressing enough air   Difficulty with swallowing: none but has noted coughing  fits   Incontinence (urinary or bowel): none     Neurologic disease in the family: mother has frontotemporal dementia         Past W/U:   - 03/31/25 Aldolase 5.0  - CK: 127  - BARRERA: 0.20 WNL  - ANCA: negative   - ESR: 13  - CRP: 1.9      Review of Systems   Constitutional:  Positive for fatigue. Negative for appetite change and fever.   HENT: Negative.  Negative for hearing loss, tinnitus, trouble swallowing and voice change.    Eyes: Negative.  Negative for photophobia, pain and visual disturbance.   Respiratory:  Positive for shortness of breath.    Cardiovascular: Negative.  Negative for palpitations.   Gastrointestinal: Negative.  Negative for nausea and vomiting.   Endocrine: Negative.  Negative for cold intolerance.   Genitourinary: Negative.  Negative for dysuria, frequency and urgency.   Musculoskeletal:  Negative for back pain, gait problem, myalgias, neck pain and neck stiffness.   Skin: Negative.  Negative for rash.   Allergic/Immunologic: Negative.    Neurological:  Positive for dizziness and weakness (generalized- worse during asthma attacks). Negative for tremors, seizures, syncope, facial asymmetry, speech difficulty, light-headedness, numbness and headaches.   Hematological: Negative.  Does not bruise/bleed easily.   Psychiatric/Behavioral: Negative.  Negative for confusion, hallucinations and sleep disturbance.    All other systems reviewed and are negative.   I have personally reviewed the MA's review of systems and made changes as necessary.    Pertinent Medical History           Medical History Reviewed by provider this encounter:     .  Past Medical History   Past Medical History:   Diagnosis Date    Allergic     Anxiety     Asthma     CPAP (continuous positive airway pressure) dependence     Dengue fever 04/08/2024    Depression     Diverticulitis of colon     Dyspnea on exertion 01/20/2022    Heart murmur     Hyperlipidemia     Hypertension     Pneumonia 03/2016    Precordial pain 01/18/2022     Sleep apnea      Past Surgical History:   Procedure Laterality Date    APPENDECTOMY      COLONOSCOPY      NE ARTHRS KNE SURG W/MENISCECTOMY MED/LAT W/SHVG Right 2/12/2024    Procedure: KNEE ARTHROSCOPIC PARTIAL MENISCECTOMY MEDIAL;  Surgeon: Morgan Cam DO;  Location: [facility] MAIN OR;  Service: Orthopedics    VASECTOMY      VasDeferens    WISDOM TOOTH EXTRACTION       Family History   Problem Relation Age of Onset    Anxiety disorder Mother     Hypertension Father     Hyperlipidemia Father     No Known Problems Sister     No Known Problems Sister     No Known Problems Son     No Known Problems Son     No Known Problems Daughter     Anxiety disorder Family     Heart disease Family     Stroke Family       reports that he has never smoked. He has never used smokeless tobacco. He reports current alcohol use of about 7.0 standard drinks of alcohol per week. He reports that he does not use drugs.  Current Outpatient Medications   Medication Instructions    Breztri Aerosphere 160-9-4.8 MCG/ACT AERO INHALE 2 INHALATIONS BY MOUTH  TWICE DAILY RINSE MOUTH AFTER  USE    diphenhydrAMINE (BENADRYL) 25 mg, As needed    ezetimibe (ZETIA) 10 mg, Oral, Daily    levalbuterol (Xopenex HFA) 45 mcg/act inhaler 1-2 puffs, Inhalation, Every 4 hours PRN    losartan (COZAAR) 50 mg, Oral, Daily at bedtime    meloxicam (MOBIC) 15 mg, Oral, Daily PRN    montelukast (SINGULAIR) 10 mg, Oral, Daily at bedtime    multivitamin (THERAGRAN) TABS 1 tablet, Daily    Triamcinolone Acetonide (Nasacort Allergy) 55 MCG/ACT nasal spray into each nostril    triamterene-hydrochlorothiazide (MAXZIDE-25) 37.5-25 mg per tablet 1 tablet, Oral, Daily   Allergies[1]   Medications Ordered Prior to Encounter[2]   Social History     Tobacco Use    Smoking status: Never    Smokeless tobacco: Never   Vaping Use    Vaping status: Never Used   Substance and Sexual Activity    Alcohol use: Yes     Alcohol/week: 7.0 standard drinks of alcohol     Types: 7 Glasses of  wine per week     Comment: one glass wine per day    Drug use: Never    Sexual activity: Yes     Partners: Female     Birth control/protection: Male Sterilization        Objective   There were no vitals taken for this visit.    Physical Exam    Eyes:      General: Lids are normal.      Extraocular Movements: Extraocular movements intact.      Pupils: Pupils are equal, round, and reactive to light.       Neurological:      Motor: Motor strength is normal.     Deep Tendon Reflexes:      Reflex Scores:       Tricep reflexes are 2+ on the right side and 2+ on the left side.       Bicep reflexes are 2+ on the right side and 2+ on the left side.       Brachioradialis reflexes are 2+ on the right side and 2+ on the left side.       Patellar reflexes are 3+ on the right side and 3+ on the left side.       Achilles reflexes are 2+ on the right side and 2+ on the left side.      GENERAL EXAM:    CONSTITUTIONAL: Well developed, well nourished, well groomed. No dysmorphic features.     Eyes:  PERRLA, EOM normal      Neck:  Normal ROM, neck supple.      HEENT:  Normocephalic atraumatic. No meningismus.    Oropharynx is clear and moist. No oral mucosal lesions.   Chest:  Respirations regular and unlabored.    Cardiovascular:  Distal extremities warm without palpable edema or tenderness, no observed significant swelling.    Musculoskeletal:  Full range of motion.    Skin:  warm and dry   Psychiatric:  Normal behavior and appropriate affect        Neurological Exam  Mental Status  Awake, alert and oriented to person, place and time. Able to name objects, name parts of objects and repeat. Able to perform serial calculations. Able to spell words backwards.    Cranial Nerves  CN II: Visual acuity is normal. Visual fields full to confrontation.  CN III, IV, VI: Extraocular movements intact bilaterally. Normal lids and orbits bilaterally. Pupils equal round and reactive to light bilaterally.  CN V: Facial sensation is normal.  CN VII:  Full and symmetric facial movement.  CN VIII: Hearing is normal.  CN IX, X: Palate elevates symmetrically. Normal gag reflex.  CN XI: Shoulder shrug strength is normal.  CN XII: Tongue midline without atrophy or fasciculations.  L ptosis appreciated .    Motor   Strength is 5/5 throughout all four extremities.  No noted fatigueable weakness .    Sensory  Sensation is intact to light touch, pinprick, vibration and proprioception in all four extremities.  Stated has .    Reflexes                                            Right                      Left  Brachioradialis                    2+                         2+  Biceps                                 2+                         2+  Triceps                                2+                         2+  Patellar                                3+                         3+  Achilles                                2+                         2+  Right Plantar: downgoing  Left Plantar: downgoing    Right pathological reflexes: Ankle clonus absent.  Left pathological reflexes: Ankle clonus absent.    Coordination  Right: Finger-to-nose normal. Rapid alternating movement normal. Heel-to-shin normal.Left: Finger-to-nose normal. Rapid alternating movement normal. Heel-to-shin normal.    Gait  Casual gait is normal including stance, stride, and arm swing.      Radiology Results Review: I personally reviewed the following image studies in PACS and associated radiology reports: CT H. My interpretation of the radiology images/reports is: no acute intracranial ABNL .             [1]   Allergies  Allergen Reactions    Budesonide-Formoterol Fumarate Other (See Comments)     Hair loss    Rosuvastatin Myalgia   [2]   Current Outpatient Medications on File Prior to Visit   Medication Sig Dispense Refill    Moasis Globalmarytri Aerosphere 160-9-4.8 MCG/ACT AERO INHALE 2 INHALATIONS BY MOUTH  TWICE DAILY RINSE MOUTH AFTER  USE 32.1 g 3    diphenhydrAMINE (BENADRYL) 25 mg tablet Take 25 mg by mouth  as needed      ezetimibe (ZETIA) 10 mg tablet TAKE 1 TABLET BY MOUTH DAILY 90 tablet 1    levalbuterol (Xopenex HFA) 45 mcg/act inhaler Inhale 1-2 puffs every 4 (four) hours as needed for wheezing 15 g 3    losartan (COZAAR) 50 mg tablet TAKE 1 TABLET BY MOUTH DAILY AT  BEDTIME 90 tablet 1    meloxicam (MOBIC) 15 mg tablet Take 1 tablet (15 mg total) by mouth daily as needed for moderate pain 60 tablet 0    montelukast (SINGULAIR) 10 mg tablet Take 1 tablet (10 mg total) by mouth daily at bedtime 30 tablet 1    Triamcinolone Acetonide (Nasacort Allergy) 55 MCG/ACT nasal spray into each nostril      triamterene-hydrochlorothiazide (MAXZIDE-25) 37.5-25 mg per tablet Take 1 tablet by mouth daily 90 tablet 1    multivitamin (THERAGRAN) TABS Take 1 tablet by mouth daily (Patient not taking: Reported on 5/20/2025)       No current facility-administered medications on file prior to visit.

## 2025-05-20 NOTE — PATIENT INSTRUCTIONS
- will get following labs: ACH ab w/ reflex to MUSK, LRP4, Vit B12, Vit D - 1,25  - will get rep stim EMG/NCS  - f/u if results +    EMG/NCS  Your provider has ordered an EMG test for you. Someone should be reaching out shortly to help you schedule this. Below is some information to help prepare you for this test  WHAT YOU NEED TO KNOW:   This test has 2 parts. The first part is called a nerve conduction study (NCS). During this part, a technician or physician will place electrodes (stickers that read electrical signals) on your skin and will give you electrical stimulations using a small probe to see how your nerves are working. The second part, the EMG (electromyography), is done by a physician who will put a very small needle into one muscle at a time to look at the activity of this muscle which will show your muscle health, peripheral nerve health, and even looks at nerve function all the way up to your neck and/or back.   Below is a link to a video that shows you what to expect on test day.        PRE-APPOINTMENT INSTRUCTIONS:   Please CONTINUE ALL your regular medications including blood thinners and diuretics.  Do not stop any medications unless explicitly directed to by your neurologist for the purposes of a specific subtype of EMG/NCS testing for neuromuscular junction diseases (see Frequently Asked Questions below)  Please do not put on any lotions or creams the day of the exam as this may interfere with the test.  This appointment usually takes 45-60 minutes.    POST-APPOINTMENT INSTRUCTIONS:   Any discomfort or muscle fatigue should resolve within an hour of testing.  You may take Tylenol or Non-steroidal Anti-inflammatory drugs (ex: Ibuprofen, Naproxen) if needed.  Recommend avoiding strenuous activity or heavy lifting for the rest of the day if possible.       EMG FREQUENTLY ASKED QUESTIONS:    What is an EMG test?  This test has 2 parts. The first part is called a nerve conduction study. During the  first part, the technician will put electrodes on your skin to test your nerves. S/he will give you electrical stimulations to see how your nerves are working. It feels similar to when you walk across carpet and then touch something metal. Some stimulations are very mild and some are stronger.  The second part, the EMG, is done by a doctor. The doctor will put a fine needle into several muscles to look at the activity.  What does EMG stand for?  Electromyography, the study of muscles.  What does this test look for?  Disorders of the nerves and muscles. Some diagnoses include carpal tunnel syndrome, ulnar neuropathy, peripheral neuropathy, radiculopathy, myopathy, myasthenia gravis and ALS.  Why was I told not to wear lotion or body oil for the test?  The technician will put sticker electrodes on your skin. If you are wearing lotion or body oil, the stickers will not stick properly. The moisturizers can also interfere with the test readings.  How long will this test take?  This test can take 30-45 minutes if one limb is being evaluated, or 45-60 minutes if 2 limbs are being evaluated. This is just an estimate. Some tests can take longer, particularly when complex or with advanced pathology, and some will take less time.  Can I eat and drink before this test?  Yes  Can I take all of my medications before the test?  Yes, you can take all of your medications, unless your doctor specifically told you not to take something before the test.  Can I take pain medication before the test?  Yes  Can I take blood thinners if I am getting an EMG?  Yes, you should take all of your regular medications, unless your doctor specifically told you not to take something before the test. If you are on blood thinners, please inform the technician and the doctor performing your test.  Are there are any medications that I cannot take before this test?  You can take all of your regular medications, unless your doctor specifically told you not  to take something.  If you are having this test to check for myasthenia gravis, your doctor might tell you to not take pyridostigmine (Mestinon) on the day of the test, until the test is complete. If your doctor is one of the doctors performing the test, you may need to ask them specifically if you should hold this medicine. If your doctor is outside of [facility], please contact your doctor regarding instructions.  Can I have this test if I have a pacemaker or defibrillator?  As long as there are no external wires from your cardiac device (meaning outside of the skin), you can have this test. Please let the technician and doctor know if you have a cardiac device.  Can I have this test if I have an external defibrillator or LVAD (left ventricular assist device)?  No. Please contact your ordering provider for next steps.  Can I drive after the test?  Yes  How long will it take for my doctor to receive the results?  24-48 hours

## 2025-05-27 ENCOUNTER — APPOINTMENT (OUTPATIENT)
Dept: LAB | Facility: MEDICAL CENTER | Age: 60
End: 2025-05-27
Attending: STUDENT IN AN ORGANIZED HEALTH CARE EDUCATION/TRAINING PROGRAM
Payer: COMMERCIAL

## 2025-05-27 DIAGNOSIS — I49.3 PVC (PREMATURE VENTRICULAR CONTRACTION): ICD-10-CM

## 2025-05-27 DIAGNOSIS — R71.8 ELEVATED MCV: ICD-10-CM

## 2025-05-27 DIAGNOSIS — I10 ESSENTIAL HYPERTENSION: ICD-10-CM

## 2025-05-27 DIAGNOSIS — R00.2 PALPITATIONS: ICD-10-CM

## 2025-05-27 DIAGNOSIS — D72.829 LEUKOCYTOSIS, UNSPECIFIED TYPE: ICD-10-CM

## 2025-05-27 DIAGNOSIS — G12.29 BULBAR WEAKNESS (HCC): ICD-10-CM

## 2025-05-27 LAB
ALBUMIN SERPL BCG-MCNC: 4.3 G/DL (ref 3.5–5)
ALP SERPL-CCNC: 73 U/L (ref 34–104)
ALT SERPL W P-5'-P-CCNC: 22 U/L (ref 7–52)
ANION GAP SERPL CALCULATED.3IONS-SCNC: 9 MMOL/L (ref 4–13)
AST SERPL W P-5'-P-CCNC: 23 U/L (ref 13–39)
BASOPHILS # BLD AUTO: 0.04 THOUSANDS/ÂΜL (ref 0–0.1)
BASOPHILS NFR BLD AUTO: 1 % (ref 0–1)
BILIRUB SERPL-MCNC: 0.66 MG/DL (ref 0.2–1)
BUN SERPL-MCNC: 15 MG/DL (ref 5–25)
CALCIUM SERPL-MCNC: 9.6 MG/DL (ref 8.4–10.2)
CHLORIDE SERPL-SCNC: 100 MMOL/L (ref 96–108)
CO2 SERPL-SCNC: 33 MMOL/L (ref 21–32)
CREAT SERPL-MCNC: 1.02 MG/DL (ref 0.6–1.3)
EOSINOPHIL # BLD AUTO: 0.11 THOUSAND/ÂΜL (ref 0–0.61)
EOSINOPHIL NFR BLD AUTO: 2 % (ref 0–6)
ERYTHROCYTE [DISTWIDTH] IN BLOOD BY AUTOMATED COUNT: 12.8 % (ref 11.6–15.1)
FOLATE SERPL-MCNC: 9.2 NG/ML
GFR SERPL CREATININE-BSD FRML MDRD: 79 ML/MIN/1.73SQ M
GLUCOSE P FAST SERPL-MCNC: 106 MG/DL (ref 65–99)
HCT VFR BLD AUTO: 48.1 % (ref 36.5–49.3)
HGB BLD-MCNC: 16.2 G/DL (ref 12–17)
IMM GRANULOCYTES # BLD AUTO: 0.02 THOUSAND/UL (ref 0–0.2)
IMM GRANULOCYTES NFR BLD AUTO: 0 % (ref 0–2)
LYMPHOCYTES # BLD AUTO: 1.54 THOUSANDS/ÂΜL (ref 0.6–4.47)
LYMPHOCYTES NFR BLD AUTO: 27 % (ref 14–44)
MCH RBC QN AUTO: 32.7 PG (ref 26.8–34.3)
MCHC RBC AUTO-ENTMCNC: 33.7 G/DL (ref 31.4–37.4)
MCV RBC AUTO: 97 FL (ref 82–98)
MONOCYTES # BLD AUTO: 0.59 THOUSAND/ÂΜL (ref 0.17–1.22)
MONOCYTES NFR BLD AUTO: 10 % (ref 4–12)
NEUTROPHILS # BLD AUTO: 3.36 THOUSANDS/ÂΜL (ref 1.85–7.62)
NEUTS SEG NFR BLD AUTO: 60 % (ref 43–75)
NRBC BLD AUTO-RTO: 0 /100 WBCS
PLATELET # BLD AUTO: 198 THOUSANDS/UL (ref 149–390)
PMV BLD AUTO: 10.9 FL (ref 8.9–12.7)
POTASSIUM SERPL-SCNC: 3.5 MMOL/L (ref 3.5–5.3)
PROT SERPL-MCNC: 7.2 G/DL (ref 6.4–8.4)
RBC # BLD AUTO: 4.95 MILLION/UL (ref 3.88–5.62)
SODIUM SERPL-SCNC: 142 MMOL/L (ref 135–147)
VIT B12 SERPL-MCNC: 147 PG/ML (ref 180–914)
WBC # BLD AUTO: 5.66 THOUSAND/UL (ref 4.31–10.16)

## 2025-05-27 PROCEDURE — 86043 ACETYLCHOLN RCPTR MODLG ANTB: CPT

## 2025-05-27 PROCEDURE — 86041 ACETYLCHOLN RCPTR BNDNG ANTB: CPT

## 2025-05-27 PROCEDURE — 86042 ACETYLCHOLN RCPTR BLCKG ANTB: CPT

## 2025-05-27 PROCEDURE — 82746 ASSAY OF FOLIC ACID SERUM: CPT

## 2025-05-27 PROCEDURE — 86366 MUSCLE-SPECIFIC KINASE ANTB: CPT

## 2025-05-27 PROCEDURE — 36415 COLL VENOUS BLD VENIPUNCTURE: CPT

## 2025-05-27 PROCEDURE — 86255 FLUORESCENT ANTIBODY SCREEN: CPT

## 2025-05-27 PROCEDURE — 82652 VIT D 1 25-DIHYDROXY: CPT

## 2025-05-27 PROCEDURE — 85025 COMPLETE CBC W/AUTO DIFF WBC: CPT

## 2025-05-27 PROCEDURE — 82607 VITAMIN B-12: CPT

## 2025-05-27 PROCEDURE — 80053 COMPREHEN METABOLIC PANEL: CPT

## 2025-05-28 ENCOUNTER — RESULTS FOLLOW-UP (OUTPATIENT)
Dept: NEUROLOGY | Facility: CLINIC | Age: 60
End: 2025-05-28

## 2025-05-30 ENCOUNTER — PATIENT MESSAGE (OUTPATIENT)
Dept: PULMONOLOGY | Facility: CLINIC | Age: 60
End: 2025-05-30

## 2025-05-30 DIAGNOSIS — J45.50 SEVERE PERSISTENT ASTHMA WITHOUT COMPLICATION: ICD-10-CM

## 2025-05-30 DIAGNOSIS — E53.8 B12 DEFICIENCY: Primary | ICD-10-CM

## 2025-05-30 LAB — 1,25(OH)2D SERPL-MCNC: 35.2 PG/ML (ref 5–200)

## 2025-05-30 RX ORDER — LANOLIN ALCOHOL/MO/W.PET/CERES
1000 CREAM (GRAM) TOPICAL DAILY
Qty: 90 TABLET | Refills: 0 | Status: SHIPPED | OUTPATIENT
Start: 2025-05-30

## 2025-06-02 RX ORDER — MONTELUKAST SODIUM 10 MG/1
10 TABLET ORAL
Qty: 90 TABLET | Refills: 3 | Status: SHIPPED | OUTPATIENT
Start: 2025-06-02

## 2025-06-04 ENCOUNTER — HOSPITAL ENCOUNTER (OUTPATIENT)
Dept: NEUROLOGY | Facility: CLINIC | Age: 60
Discharge: HOME | End: 2025-06-04
Payer: COMMERCIAL

## 2025-06-04 DIAGNOSIS — I10 ESSENTIAL HYPERTENSION: ICD-10-CM

## 2025-06-04 DIAGNOSIS — G12.29 BULBAR WEAKNESS (HCC): ICD-10-CM

## 2025-06-04 PROCEDURE — 95886 MUSC TEST DONE W/N TEST COMP: CPT | Performed by: PSYCHIATRY & NEUROLOGY

## 2025-06-04 PROCEDURE — 95937 NEUROMUSCULAR JUNCTION TEST: CPT | Performed by: PSYCHIATRY & NEUROLOGY

## 2025-06-04 PROCEDURE — 95911 NRV CNDJ TEST 9-10 STUDIES: CPT | Performed by: PSYCHIATRY & NEUROLOGY

## 2025-06-05 RX ORDER — TRIAMTERENE AND HYDROCHLOROTHIAZIDE 37.5; 25 MG/1; MG/1
1 TABLET ORAL DAILY
Qty: 90 TABLET | Refills: 3 | Status: SHIPPED | OUTPATIENT
Start: 2025-06-05

## 2025-06-06 ENCOUNTER — HOSPITAL ENCOUNTER (OUTPATIENT)
Dept: GASTROENTEROLOGY | Facility: HOSPITAL | Age: 60
Setting detail: OUTPATIENT SURGERY
End: 2025-06-06
Attending: STUDENT IN AN ORGANIZED HEALTH CARE EDUCATION/TRAINING PROGRAM
Payer: COMMERCIAL

## 2025-06-06 ENCOUNTER — ANESTHESIA EVENT (OUTPATIENT)
Dept: GASTROENTEROLOGY | Facility: HOSPITAL | Age: 60
End: 2025-06-06
Payer: COMMERCIAL

## 2025-06-06 ENCOUNTER — ANESTHESIA (OUTPATIENT)
Dept: GASTROENTEROLOGY | Facility: HOSPITAL | Age: 60
End: 2025-06-06
Payer: COMMERCIAL

## 2025-06-06 VITALS
WEIGHT: 166.23 LBS | TEMPERATURE: 97.8 F | HEIGHT: 69 IN | HEART RATE: 67 BPM | OXYGEN SATURATION: 98 % | SYSTOLIC BLOOD PRESSURE: 119 MMHG | DIASTOLIC BLOOD PRESSURE: 85 MMHG | RESPIRATION RATE: 21 BRPM | BODY MASS INDEX: 24.62 KG/M2

## 2025-06-06 DIAGNOSIS — Z86.0100 HISTORY OF COLON POLYPS: ICD-10-CM

## 2025-06-06 DIAGNOSIS — Z12.11 SCREENING FOR COLON CANCER: ICD-10-CM

## 2025-06-06 PROCEDURE — 88305 TISSUE EXAM BY PATHOLOGIST: CPT | Performed by: STUDENT IN AN ORGANIZED HEALTH CARE EDUCATION/TRAINING PROGRAM

## 2025-06-06 PROCEDURE — 88342 IMHCHEM/IMCYTCHM 1ST ANTB: CPT | Performed by: STUDENT IN AN ORGANIZED HEALTH CARE EDUCATION/TRAINING PROGRAM

## 2025-06-06 PROCEDURE — 88341 IMHCHEM/IMCYTCHM EA ADD ANTB: CPT | Performed by: STUDENT IN AN ORGANIZED HEALTH CARE EDUCATION/TRAINING PROGRAM

## 2025-06-06 PROCEDURE — 88360 TUMOR IMMUNOHISTOCHEM/MANUAL: CPT | Performed by: STUDENT IN AN ORGANIZED HEALTH CARE EDUCATION/TRAINING PROGRAM

## 2025-06-06 PROCEDURE — 45385 COLONOSCOPY W/LESION REMOVAL: CPT | Performed by: INTERNAL MEDICINE

## 2025-06-06 RX ORDER — PROPOFOL 10 MG/ML
INJECTION, EMULSION INTRAVENOUS AS NEEDED
Status: DISCONTINUED | OUTPATIENT
Start: 2025-06-06 | End: 2025-06-06

## 2025-06-06 RX ORDER — SODIUM CHLORIDE, SODIUM LACTATE, POTASSIUM CHLORIDE, CALCIUM CHLORIDE 600; 310; 30; 20 MG/100ML; MG/100ML; MG/100ML; MG/100ML
50 INJECTION, SOLUTION INTRAVENOUS CONTINUOUS
Status: DISCONTINUED | OUTPATIENT
Start: 2025-06-06 | End: 2025-06-10 | Stop reason: HOSPADM

## 2025-06-06 RX ORDER — PROPOFOL 10 MG/ML
INJECTION, EMULSION INTRAVENOUS CONTINUOUS PRN
Status: DISCONTINUED | OUTPATIENT
Start: 2025-06-06 | End: 2025-06-06

## 2025-06-06 RX ADMIN — PROPOFOL 150 MCG/KG/MIN: 10 INJECTION, EMULSION INTRAVENOUS at 08:38

## 2025-06-06 RX ADMIN — SODIUM CHLORIDE, SODIUM LACTATE, POTASSIUM CHLORIDE, AND CALCIUM CHLORIDE 50 ML/HR: .6; .31; .03; .02 INJECTION, SOLUTION INTRAVENOUS at 07:45

## 2025-06-06 RX ADMIN — PROPOFOL 120 MG: 10 INJECTION, EMULSION INTRAVENOUS at 08:38

## 2025-06-06 NOTE — ANESTHESIA POSTPROCEDURE EVALUATION
Post-Op Assessment Note    CV Status:  Stable    Pain management: adequate       Mental Status:  Awake and sleepy   Hydration Status:  Euvolemic   PONV Controlled:  Controlled   Airway Patency:  Patent     Post Op Vitals Reviewed: Yes    No anethesia notable event occurred.    Staff: CRNA           Last Filed PACU Vitals:  Vitals Value Taken Time   Temp 97.8 °F (36.6 °C) 06/06/25 08:52   Pulse 60 06/06/25 08:52   /58 06/06/25 08:52   Resp 18 06/06/25 08:52   SpO2 98 % 06/06/25 08:52       Modified Zoila:     Vitals Value Taken Time   Activity 2 06/06/25 08:52   Respiration 2 06/06/25 08:52   Circulation 2 06/06/25 08:52   Consciousness 1 06/06/25 08:52   Oxygen Saturation 2 06/06/25 08:52     Modified Zoila Score: 9

## 2025-06-06 NOTE — H&P
"History and Physical -  [facility]  Sean Thomas 59 y.o. male MRN: [id number]                  HPI: Sean Thomas is a 59 y.o. year old male who presents for colonoscopy for history of colon polyps.      REVIEW OF SYSTEMS: Per the HPI, and otherwise unremarkable.    Historical Information   Past Medical History[1]  Past Surgical History[2]  Social History   Social History     Substance and Sexual Activity   Alcohol Use Yes    Alcohol/week: 7.0 standard drinks of alcohol    Types: 7 Glasses of wine per week    Comment: one glass wine per day     Social History     Substance and Sexual Activity   Drug Use Never     Tobacco Use History[3]  Family History[4]    Meds/Allergies     Current Medications[5]    Allergies[6]    Objective     /69   Pulse 97   Temp 97.5 °F (36.4 °C) (Temporal)   Resp 16   Ht 5' 9\" (1.753 m)   Wt 75.4 kg (166 lb 3.6 oz)   SpO2 98%   BMI 24.55 kg/m²       PHYSICAL EXAM    Gen: NAD  Head: NCAT  CV: RRR  CHEST: Clear  ABD: soft, NT/ND  EXT: no edema      ASSESSMENT/PLAN: Sean Thomas is a 59 y.o. year old male who presents for colonoscopy for history of colon polyps. The patient is stable and optimized for the procedure, we reviewed risk and benefits. Risk include but not limited to infection, bleeding, perforation and missing a lesion.  .             [1]   Past Medical History:  Diagnosis Date    Allergic     Anxiety     Asthma     CPAP (continuous positive airway pressure) dependence     Dengue fever 04/08/2024    Depression     Diverticulitis of colon     Dyspnea on exertion 01/20/2022    Heart murmur     Hyperlipidemia     Hypertension     Pneumonia 03/2016    Precordial pain 01/18/2022    Sleep apnea    [2]   Past Surgical History:  Procedure Laterality Date    APPENDECTOMY      COLONOSCOPY      [place] ARTHRS KNE SURG W/MENISCECTOMY MED/LAT W/SHVG Right 2/12/2024    Procedure: KNEE ARTHROSCOPIC PARTIAL MENISCECTOMY MEDIAL;  Surgeon: Morgan Baum " DO Tutu;  Location: [facility];  Service: Orthopedics    VASECTOMY      VasDeferens    WISDOM TOOTH EXTRACTION     [3]   Social History  Tobacco Use   Smoking Status Never   Smokeless Tobacco Never   [4]   Family History  Problem Relation Name Age of Onset    Anxiety disorder Mother Marla     Hypertension Father Sree     Hyperlipidemia Father Sree     No Known Problems Sister      No Known Problems Sister      No Known Problems Son      No Known Problems Son      No Known Problems Daughter      Anxiety disorder Family      Heart disease Family      Stroke Family     [5]   Current Outpatient Medications:     Breztri Aerosphere 160-9-4.8 MCG/ACT AERO    ezetimibe (ZETIA) 10 mg tablet    levalbuterol (Xopenex HFA) 45 mcg/act inhaler    losartan (COZAAR) 50 mg tablet    montelukast (SINGULAIR) 10 mg tablet    Triamcinolone Acetonide (Nasacort Allergy) 55 MCG/ACT nasal spray    triamterene-hydrochlorothiazide (MAXZIDE-25) 37.5-25 mg per tablet    vitamin B-12 (VITAMIN B-12) 1,000 mcg tablet    diphenhydrAMINE (BENADRYL) 25 mg tablet    meloxicam (MOBIC) 15 mg tablet    multivitamin (THERAGRAN) TABS    Current Facility-Administered Medications:     lactated ringers infusion, 50 mL/hr, Intravenous, Continuous, Continue from Pre-op at 06/06/25 0817  [6]   Allergies  Allergen Reactions    Budesonide-Formoterol Fumarate Other (See Comments)     Hair loss    Rosuvastatin Myalgia

## 2025-06-06 NOTE — ANESTHESIA PREPROCEDURE EVALUATION
Procedure:  COLONOSCOPY    Relevant Problems   CARDIO   (+) Essential hypertension   (+) Pure hypercholesterolemia      NEURO/PSYCH   (+) Episodic paroxysmal hemicrania, not intractable   (+) GABBY (generalized anxiety disorder)      PULMONARY   (+) ALEX (obstructive sleep apnea)   (+) Severe persistent asthma without complication        Physical Exam    Airway     Mallampati score: II  TM Distance: >3 FB  Neck ROM: full      Cardiovascular  Rhythm: regularCardiovascular exam normal    Dental       Pulmonary  Pulmonary exam normal     Neurological      Other Findings        Anesthesia Plan  ASA Score- 3     Anesthesia Type- IV sedation with anesthesia with ASA Monitors.         Additional Monitors:     Airway Plan:            Plan Factors-Exercise tolerance (METS): >4 METS.    Chart reviewed. EKG reviewed. Imaging results reviewed. Existing labs reviewed. Patient summary reviewed.    Patient is not a current smoker.              Induction- intravenous.    Postoperative Plan- .   Monitoring Plan - Monitoring plan - standard ASA monitoring          Informed Consent- Anesthetic plan and risks discussed with patient.  I personally reviewed this patient with the CRNA. Discussed and agreed on the Anesthesia Plan with the CRNA..      NPO Status:  Vitals Value Taken Time   Date of last liquid 06/05/25 06/06/25 07:39   Time of last liquid 2300 06/06/25 07:39   Date of last solid 06/04/25 06/06/25 07:39   Time of last solid 1900 06/06/25 07:39

## 2025-06-12 ENCOUNTER — RESULTS FOLLOW-UP (OUTPATIENT)
Age: 60
End: 2025-06-12

## 2025-06-12 PROCEDURE — 88341 IMHCHEM/IMCYTCHM EA ADD ANTB: CPT | Performed by: STUDENT IN AN ORGANIZED HEALTH CARE EDUCATION/TRAINING PROGRAM

## 2025-06-12 PROCEDURE — 88360 TUMOR IMMUNOHISTOCHEM/MANUAL: CPT | Performed by: STUDENT IN AN ORGANIZED HEALTH CARE EDUCATION/TRAINING PROGRAM

## 2025-06-12 PROCEDURE — 88305 TISSUE EXAM BY PATHOLOGIST: CPT | Performed by: STUDENT IN AN ORGANIZED HEALTH CARE EDUCATION/TRAINING PROGRAM

## 2025-06-12 PROCEDURE — 88342 IMHCHEM/IMCYTCHM 1ST ANTB: CPT | Performed by: STUDENT IN AN ORGANIZED HEALTH CARE EDUCATION/TRAINING PROGRAM

## 2025-06-13 ENCOUNTER — OFFICE VISIT (OUTPATIENT)
Dept: FAMILY MEDICINE CLINIC | Facility: MEDICAL CENTER | Age: 60
End: 2025-06-13
Payer: COMMERCIAL

## 2025-06-13 VITALS
TEMPERATURE: 98 F | BODY MASS INDEX: 25.42 KG/M2 | OXYGEN SATURATION: 98 % | HEIGHT: 69 IN | WEIGHT: 171.6 LBS | DIASTOLIC BLOOD PRESSURE: 80 MMHG | SYSTOLIC BLOOD PRESSURE: 120 MMHG | HEART RATE: 67 BPM | RESPIRATION RATE: 20 BRPM

## 2025-06-13 DIAGNOSIS — G89.29 CHRONIC PAIN OF RIGHT KNEE: ICD-10-CM

## 2025-06-13 DIAGNOSIS — E78.00 PURE HYPERCHOLESTEROLEMIA: ICD-10-CM

## 2025-06-13 DIAGNOSIS — J30.1 ALLERGIC RHINITIS DUE TO POLLEN, UNSPECIFIED SEASONALITY: ICD-10-CM

## 2025-06-13 DIAGNOSIS — I10 ESSENTIAL HYPERTENSION: ICD-10-CM

## 2025-06-13 DIAGNOSIS — G47.33 OSA ON CPAP: ICD-10-CM

## 2025-06-13 DIAGNOSIS — M25.561 CHRONIC PAIN OF RIGHT KNEE: ICD-10-CM

## 2025-06-13 DIAGNOSIS — J45.50 SEVERE PERSISTENT ASTHMA WITHOUT COMPLICATION: ICD-10-CM

## 2025-06-13 DIAGNOSIS — Z09 FOLLOW-UP EXAM, 3-6 MONTHS SINCE PREVIOUS EXAM: Primary | ICD-10-CM

## 2025-06-13 DIAGNOSIS — R73.01 ELEVATED FASTING BLOOD SUGAR: ICD-10-CM

## 2025-06-13 PROCEDURE — 99214 OFFICE O/P EST MOD 30 MIN: CPT | Performed by: STUDENT IN AN ORGANIZED HEALTH CARE EDUCATION/TRAINING PROGRAM

## 2025-06-13 NOTE — PROGRESS NOTES
":  Assessment & Plan  Follow-up exam, 3-6 months since previous exam  Return when annual physical due and sooner as needed       Essential hypertension  Under excellent control current regimen, continue Maxzide-25 1 tablet p.o. daily and losartan 50 mg p.o. daily  Most recent CMP reviewed       Pure hypercholesterolemia  Continue Zetia 10 mg p.o. daily  Orders:    Lipid Panel with Direct LDL reflex; Future    Comprehensive metabolic panel; Future    Severe persistent asthma without complication  Follows with pulmonology  Breztri Aerosphere and Xopenex inhalers  Singulair 10 mg p.o. nightly       ALEX on CPAP  CPAP nightly       Allergic rhinitis due to pollen, unspecified seasonality  Singulair 10 mg p.o. nightly  Nasacort nasal spray       Elevated fasting blood sugar    Orders:    Hemoglobin A1C; Future    Chronic pain of right knee  Knee x-ray from 2023 with mild arthritis  Can continue NSAID as needed topical or oral  Orders:    Ambulatory Referral to Orthopedic Surgery; Future    XR knee 3 vw right non injury; Future        History of Present Illness     Sean Thomas is a 59 y.o. male who presents for follow-up.  Patient mentions right knee pain for the past few months.  No injury.    Review of Systems    As noted in HPI   Objective   /80 (BP Location: Right arm)   Pulse 67   Temp 98 °F (36.7 °C) (Temporal)   Resp 20   Ht 5' 9\" (1.753 m)   Wt 77.8 kg (171 lb 9.6 oz)   SpO2 98%   BMI 25.34 kg/m²      Physical Exam  Vitals reviewed.   Constitutional:       General: He is not in acute distress.     Appearance: Normal appearance.   HENT:      Head: Normocephalic and atraumatic.     Eyes:      Conjunctiva/sclera: Conjunctivae normal.       Cardiovascular:      Rate and Rhythm: Normal rate and regular rhythm.      Pulses: Normal pulses.      Heart sounds: Normal heart sounds.   Pulmonary:      Effort: Pulmonary effort is normal.      Breath sounds: Normal breath sounds.     Musculoskeletal:      " Right knee: Crepitus present. No swelling, deformity, erythema, ecchymosis or bony tenderness. Normal range of motion. No tenderness. No LCL laxity, MCL laxity, ACL laxity or PCL laxity. Normal alignment, normal meniscus and normal patellar mobility. Normal pulse.      Instability Tests: Anterior drawer test negative. Posterior drawer test negative. Anterior Lachman test negative. Medial Yoan test negative and lateral Yoan test negative.      Left knee: Normal.      Right lower leg: No edema.      Left lower leg: No edema.     Skin:     General: Skin is warm and dry.     Neurological:      Mental Status: He is alert and oriented to person, place, and time.     Psychiatric:         Mood and Affect: Mood normal.         Behavior: Behavior normal.         Thought Content: Thought content normal.

## 2025-06-13 NOTE — ASSESSMENT & PLAN NOTE
Under excellent control current regimen, continue Maxzide-25 1 tablet p.o. daily and losartan 50 mg p.o. daily  Most recent CMP reviewed        for posture throughout. Pt required increased time to complete task secondary to abdominal discomfort. Light Kitchen Task: Pt was instructed to follow simple 3-step recipe within kitchen area. Pt required SBA while using RW to gather recipe items throughout. Pt required verbal cues for problem solving and for organized search pattern. Pt followed instructions appropriately to complete recipe while ambulating throughout kitchen. Second Session:  Pt seated in recliner upon entry, pleasant and agreeable to OT treatment session. Pt denied novel pain. Pt requested toileting, but denied additional ADL needs. Pt completed multiple sit<>stands within session varying from SBA-CGA. Pt ambulated self to bathroom and therapy room with RW. Pt required verbal/tactile cues for RW management and navigation. Pt ambulated to/from therapy room for about 290 ft total and for short distances around therapy room. Pt educated on various adaptive equipment for lower body dressing (reacher tool, dressing stick, and sock aide). Pt successfully trialed using sock aide with VC for problem solving throughout . Lower Extremity Dressing: supervision  Dressing Equipment: sock aide  Dressing Comments: Pt doffed socks in figure four position with SUP. After initial education on sock aide pt used sock aide to don socks with SUP. Dynamic mobility/Item retrieval: Upon entering dining room area pt completed dynamic balance/home management activity which included ambulating self around dining area without use of device and SBA to gather 5x items of clothing using reacher tool. Pt then used reacher tool to gather 2 wash cloths from upper cabinet. Pt then cleaned up a small spill using reacher tool and wash cloth with SBA throughout. Pt required 1 seated rest break after activity. Pt required verbal cues for organized search pattern within room with variable carryover noted. Pt ambulated back to room at EOS with RW and SBA.  Pt left in recliner w/ chair alarm on, call light and needs in reach. ADLs  Grooming: supervision  Grooming Comments: Pt completed oral hygiene while standing at sink with RW and SUP for about 4 minutes. Toileting: supervision. Toileting Comments: Pt voided urine and BM after transferring to toilet with RW with SUP and verbal cues for hand placement. Pt required increased time and completed ly-hygiene while seated. Cognition  WFL  Orientation:    alert and oriented x 4  Command Following:   [facility]     Education  Barriers To Learning: none  Patient Education: patient educated on goals, OT role and benefits, plan of care, proper use of assistive device/equipment, adaptive device training, energy conservation, transfer training, discharge recommendations  Learning Assessment:  patient verbalizes and demonstrates understanding    Assessment  Activity Tolerance: fair- pt tolerated mobility with no rest breaks required throughout. Impairments Requiring Therapeutic Intervention: decreased functional mobility, decreased ADL status, decreased ROM, decreased strength, decreased endurance, decreased balance, decreased IADL, increased pain  Prognosis: good  Clinical Assessment: Pt currently presents with deficits listed above. Pt is currently below baseline and requires assistance with mobility and ADL tasks. Pt would benefit from continued skilled OT in acute rehab setting to return to [facility].    Safety Interventions: patient left in chair, chair alarm in place, call light within reach, gait belt, patient at risk for falls, nurse notified, and family/caregiver present    Plan  Frequency: 5 x/week, 90 min/day  Current Treatment Recommendations: strengthening, ROM, balance training, functional mobility training, transfer training, gait training, endurance training, patient/caregiver education, ADL/self-care training, and safety education    Goals  Patient Goals: to return home   Short Term Goals:  Time Frame: 2

## 2025-06-13 NOTE — ASSESSMENT & PLAN NOTE
Follows with pulmonology  [facility]  Singulair 10 mg p.o. nightly

## 2025-06-13 NOTE — ASSESSMENT & PLAN NOTE
Knee x-ray from 2023 with mild arthritis  Can continue NSAID as needed topical or oral  Orders:    Ambulatory Referral to Orthopedic Surgery; Future    XR knee 3 vw right non injury; Future

## 2025-06-13 NOTE — ASSESSMENT & PLAN NOTE
Continue Zetia 10 mg p.o. daily  Orders:    Lipid Panel with Direct LDL reflex; Future    Comprehensive metabolic panel; Future

## 2025-06-14 LAB — MISCELLANEOUS LAB TEST RESULT: NORMAL

## 2025-06-15 LAB — MISCELLANEOUS LAB TEST RESULT: NORMAL

## 2025-06-17 ENCOUNTER — APPOINTMENT (OUTPATIENT)
Dept: RADIOLOGY | Facility: MEDICAL CENTER | Age: 60
End: 2025-06-17
Payer: COMMERCIAL

## 2025-06-17 ENCOUNTER — RESULTS FOLLOW-UP (OUTPATIENT)
Dept: NEUROLOGY | Facility: CLINIC | Age: 60
End: 2025-06-17

## 2025-06-17 DIAGNOSIS — M25.561 CHRONIC PAIN OF RIGHT KNEE: ICD-10-CM

## 2025-06-17 DIAGNOSIS — G89.29 CHRONIC PAIN OF RIGHT KNEE: ICD-10-CM

## 2025-06-17 PROCEDURE — 73562 X-RAY EXAM OF KNEE 3: CPT

## 2025-06-20 ENCOUNTER — PREP FOR PROCEDURE (OUTPATIENT)
Dept: CARDIOLOGY CLINIC | Facility: CLINIC | Age: 60
End: 2025-06-20

## 2025-06-20 ENCOUNTER — PATIENT MESSAGE (OUTPATIENT)
Dept: CARDIOLOGY CLINIC | Facility: CLINIC | Age: 60
End: 2025-06-20

## 2025-06-20 ENCOUNTER — HOSPITAL ENCOUNTER (OUTPATIENT)
Facility: HOSPITAL | Age: 60
Setting detail: OUTPATIENT SURGERY
Discharge: HOME | End: 2025-06-20
Attending: STUDENT IN AN ORGANIZED HEALTH CARE EDUCATION/TRAINING PROGRAM | Admitting: STUDENT IN AN ORGANIZED HEALTH CARE EDUCATION/TRAINING PROGRAM
Payer: COMMERCIAL

## 2025-06-20 ENCOUNTER — TELEPHONE (OUTPATIENT)
Dept: CARDIOLOGY CLINIC | Facility: CLINIC | Age: 60
End: 2025-06-20

## 2025-06-20 DIAGNOSIS — R00.2 PALPITATIONS: Primary | ICD-10-CM

## 2025-06-20 DIAGNOSIS — I49.3 PVC (PREMATURE VENTRICULAR CONTRACTION): ICD-10-CM

## 2025-06-20 DIAGNOSIS — R00.2 PALPITATIONS: ICD-10-CM

## 2025-06-20 PROBLEM — J45.909 SEVERE ASTHMA: Status: ACTIVE | Noted: 2025-06-20

## 2025-06-20 LAB
ANION GAP SERPL CALCULATED.3IONS-SCNC: 3 MMOL/L (ref 4–13)
ATRIAL RATE: 78 BPM
BUN SERPL-MCNC: 12 MG/DL (ref 5–25)
CALCIUM SERPL-MCNC: 9.1 MG/DL (ref 8.4–10.2)
CHLORIDE SERPL-SCNC: 101 MMOL/L (ref 96–108)
CO2 SERPL-SCNC: 33 MMOL/L (ref 21–32)
CREAT SERPL-MCNC: 1 MG/DL (ref 0.6–1.3)
ERYTHROCYTE [DISTWIDTH] IN BLOOD BY AUTOMATED COUNT: 12.2 % (ref 11.6–15.1)
GFR SERPL CREATININE-BSD FRML MDRD: 81 ML/MIN/1.73SQ M
GLUCOSE P FAST SERPL-MCNC: 101 MG/DL (ref 65–99)
GLUCOSE SERPL-MCNC: 101 MG/DL (ref 65–140)
HCT VFR BLD AUTO: 45.8 % (ref 36.5–49.3)
HGB BLD-MCNC: 16.1 G/DL (ref 12–17)
INR PPP: 0.95 (ref 0.85–1.19)
MCH RBC QN AUTO: 33.4 PG (ref 26.8–34.3)
MCHC RBC AUTO-ENTMCNC: 35.2 G/DL (ref 31.4–37.4)
MCV RBC AUTO: 95 FL (ref 82–98)
P AXIS: 74 DEGREES
PLATELET # BLD AUTO: 183 THOUSANDS/UL (ref 149–390)
PMV BLD AUTO: 10.2 FL (ref 8.9–12.7)
POTASSIUM SERPL-SCNC: 5.5 MMOL/L (ref 3.5–5.3)
PR INTERVAL: 178 MS
PROTHROMBIN TIME: 13 SECONDS (ref 12.3–15)
QRS AXIS: -27 DEGREES
QRSD INTERVAL: 74 MS
QT INTERVAL: 380 MS
QTC INTERVAL: 433 MS
RBC # BLD AUTO: 4.82 MILLION/UL (ref 3.88–5.62)
SODIUM SERPL-SCNC: 137 MMOL/L (ref 135–147)
T WAVE AXIS: 54 DEGREES
VENTRICULAR RATE: 78 BPM
WBC # BLD AUTO: 6.35 THOUSAND/UL (ref 4.31–10.16)

## 2025-06-20 PROCEDURE — NC001 PR NO CHARGE: Performed by: PHYSICIAN ASSISTANT

## 2025-06-20 PROCEDURE — 93010 ELECTROCARDIOGRAM REPORT: CPT | Performed by: INTERNAL MEDICINE

## 2025-06-20 PROCEDURE — 80048 BASIC METABOLIC PNL TOTAL CA: CPT | Performed by: PHYSICIAN ASSISTANT

## 2025-06-20 PROCEDURE — 85610 PROTHROMBIN TIME: CPT | Performed by: PHYSICIAN ASSISTANT

## 2025-06-20 PROCEDURE — 85027 COMPLETE CBC AUTOMATED: CPT | Performed by: PHYSICIAN ASSISTANT

## 2025-06-20 PROCEDURE — 93005 ELECTROCARDIOGRAM TRACING: CPT

## 2025-06-20 RX ORDER — SODIUM CHLORIDE 9 MG/ML
20 INJECTION, SOLUTION INTRAVENOUS ONCE
Status: DISCONTINUED | OUTPATIENT
Start: 2025-06-20 | End: 2025-06-20 | Stop reason: HOSPADM

## 2025-06-20 RX ORDER — SODIUM CHLORIDE 9 MG/ML
20 INJECTION, SOLUTION INTRAVENOUS CONTINUOUS
Status: DISCONTINUED | OUTPATIENT
Start: 2025-06-20 | End: 2025-06-20 | Stop reason: HOSPADM

## 2025-06-20 RX ORDER — PANTOPRAZOLE SODIUM 40 MG/10ML
40 INJECTION, POWDER, LYOPHILIZED, FOR SOLUTION INTRAVENOUS ONCE
Status: DISCONTINUED | OUTPATIENT
Start: 2025-06-20 | End: 2025-06-20 | Stop reason: HOSPADM

## 2025-06-20 RX ADMIN — SODIUM CHLORIDE 20 ML/HR: 0.9 INJECTION, SOLUTION INTRAVENOUS at 06:54

## 2025-06-20 NOTE — TELEPHONE ENCOUNTER
----- Message from Zoran Van MD sent at 6/20/2025  4:10 PM EDT -----  Could we schedule Mr. Dickey for a loop recorder implant?    [employer]
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
## 2025-06-20 NOTE — ASSESSMENT & PLAN NOTE
Highly symptomatic with palpitations symptoms aligning with PVCs on Zio monitor   Zio monitor with 6.6% burden  EKG with PVCs possibly coming from RVOT/anterior septal region  Offered beta-blocker however patient declined  Stress testing done which was negative  Offered medications versus ablation versus long-term monitoring patient opted for ablation  He understands if no PVCs are present we will be unable to ablate  Presents for elective ablation today

## 2025-06-20 NOTE — QUICK NOTE
PVC's not inducible. Plan for discharge and set up loop recorder outpatient. Ablation not preformed.

## 2025-06-20 NOTE — TELEPHONE ENCOUNTER
"Per insurance requires patient to have 2W ZIO.     Patient did 9 days ZIO (3/29/25-4/8/25)    Per Yulisa, we can try to submit for auth for loop imp with this zio and see what happens.     Called patient and informed per above. Patient OK with trying to schedule LOOP Recorder Implant with the current zio of 9 days and understands if insurance denies because needs additional zio days, he will wear another zio.     Patient is scheduled for LOOP Recorder Implant on 7/30/25 at [facility] with .     Patient aware of all general instructions.    Instructions sent to patient through [employer].      Medication holds:   Triamterene/Hydrochlorothiazide - Do not take morning of procedure.    Blood Thinners:   N/A    Blood work to be done on:  N/A  (Patient did BMP / CBC 6/20/25)      Thank you,  Velvet \"Thalia\" Keny      "

## 2025-06-25 ENCOUNTER — OFFICE VISIT (OUTPATIENT)
Dept: OBGYN CLINIC | Facility: CLINIC | Age: 60
End: 2025-06-25
Attending: STUDENT IN AN ORGANIZED HEALTH CARE EDUCATION/TRAINING PROGRAM
Payer: COMMERCIAL

## 2025-06-25 VITALS — WEIGHT: 171 LBS | BODY MASS INDEX: 25.33 KG/M2 | HEIGHT: 69 IN

## 2025-06-25 DIAGNOSIS — M25.561 CHRONIC PAIN OF RIGHT KNEE: ICD-10-CM

## 2025-06-25 DIAGNOSIS — G89.29 CHRONIC PAIN OF RIGHT KNEE: ICD-10-CM

## 2025-06-25 PROCEDURE — 99213 OFFICE O/P EST LOW 20 MIN: CPT | Performed by: STUDENT IN AN ORGANIZED HEALTH CARE EDUCATION/TRAINING PROGRAM

## 2025-06-25 PROCEDURE — 20610 DRAIN/INJ JOINT/BURSA W/O US: CPT

## 2025-06-25 RX ORDER — BUPIVACAINE HYDROCHLORIDE 2.5 MG/ML
4 INJECTION, SOLUTION INFILTRATION; PERINEURAL
Status: COMPLETED | OUTPATIENT
Start: 2025-06-25 | End: 2025-06-25

## 2025-06-25 RX ORDER — TRIAMCINOLONE ACETONIDE 40 MG/ML
40 INJECTION, SUSPENSION INTRA-ARTICULAR; INTRAMUSCULAR
Status: COMPLETED | OUTPATIENT
Start: 2025-06-25 | End: 2025-06-25

## 2025-06-25 RX ADMIN — BUPIVACAINE HYDROCHLORIDE 4 ML: 2.5 INJECTION, SOLUTION INFILTRATION; PERINEURAL at 15:45

## 2025-06-25 RX ADMIN — TRIAMCINOLONE ACETONIDE 40 MG: 40 INJECTION, SUSPENSION INTRA-ARTICULAR; INTRAMUSCULAR at 15:45

## 2025-06-25 NOTE — ASSESSMENT & PLAN NOTE
Discussed and recommended corticosteroid injection as previous injection allowed months of relief  Patient in agreement and a right knee corticosteroid injection was given without complication and was well tolerated  We discussed visco gel injection as an alternative if corticosteroid does not help  Discussed that if injection does not provide relief in 4 weeks he should call the office and can submit prior authorization for visco injection    Orders:    Ambulatory Referral to Orthopedic Surgery
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
## 2025-06-26 DIAGNOSIS — E78.00 PURE HYPERCHOLESTEROLEMIA: ICD-10-CM

## 2025-06-27 RX ORDER — EZETIMIBE 10 MG/1
10 TABLET ORAL DAILY
Qty: 90 TABLET | Refills: 3 | Status: SHIPPED | OUTPATIENT
Start: 2025-06-27

## 2025-07-30 ENCOUNTER — HOSPITAL ENCOUNTER (OUTPATIENT)
Facility: HOSPITAL | Age: 60
Setting detail: OUTPATIENT SURGERY
Discharge: HOME | End: 2025-07-30
Attending: INTERNAL MEDICINE | Admitting: INTERNAL MEDICINE
Payer: COMMERCIAL

## 2025-07-30 VITALS
OXYGEN SATURATION: 95 % | DIASTOLIC BLOOD PRESSURE: 65 MMHG | TEMPERATURE: 98.9 F | HEART RATE: 87 BPM | SYSTOLIC BLOOD PRESSURE: 122 MMHG | RESPIRATION RATE: 18 BRPM

## 2025-07-30 DIAGNOSIS — R00.2 PALPITATIONS: ICD-10-CM

## 2025-07-30 PROCEDURE — 33285 INSJ SUBQ CAR RHYTHM MNTR: CPT | Performed by: PHYSICIAN ASSISTANT

## 2025-07-30 PROCEDURE — NC001 PR NO CHARGE: Performed by: PHYSICIAN ASSISTANT

## 2025-07-30 PROCEDURE — C1764 EVENT RECORDER, CARDIAC: HCPCS | Performed by: INTERNAL MEDICINE

## 2025-07-30 DEVICE — IMPLANTABLE DEVICE: Type: IMPLANTABLE DEVICE | Site: CHEST | Status: FUNCTIONAL

## 2025-07-30 RX ORDER — LIDOCAINE HYDROCHLORIDE 10 MG/ML
INJECTION, SOLUTION EPIDURAL; INFILTRATION; INTRACAUDAL; PERINEURAL
Status: DISCONTINUED
Start: 2025-07-30 | End: 2025-07-30 | Stop reason: HOSPADM

## 2025-07-30 RX ORDER — LIDOCAINE HYDROCHLORIDE 10 MG/ML
INJECTION, SOLUTION EPIDURAL; INFILTRATION; INTRACAUDAL; PERINEURAL CODE/TRAUMA/SEDATION MEDICATION
Status: DISCONTINUED | OUTPATIENT
Start: 2025-07-30 | End: 2025-07-30 | Stop reason: HOSPADM

## 2025-08-05 ENCOUNTER — IN-CLINIC DEVICE VISIT (OUTPATIENT)
Dept: CARDIOLOGY CLINIC | Facility: MEDICAL CENTER | Age: 60
End: 2025-08-05

## 2025-08-05 DIAGNOSIS — Z95.818 PRESENCE OF OTHER CARDIAC IMPLANTS AND GRAFTS: Primary | ICD-10-CM

## 2025-08-05 PROCEDURE — 99024 POSTOP FOLLOW-UP VISIT: CPT | Performed by: STUDENT IN AN ORGANIZED HEALTH CARE EDUCATION/TRAINING PROGRAM

## 2025-08-06 ENCOUNTER — OFFICE VISIT (OUTPATIENT)
Dept: PULMONOLOGY | Facility: CLINIC | Age: 60
End: 2025-08-06
Payer: COMMERCIAL

## 2025-08-06 VITALS
DIASTOLIC BLOOD PRESSURE: 74 MMHG | BODY MASS INDEX: 25.4 KG/M2 | HEART RATE: 80 BPM | TEMPERATURE: 97.3 F | SYSTOLIC BLOOD PRESSURE: 120 MMHG | OXYGEN SATURATION: 97 % | WEIGHT: 172 LBS

## 2025-08-06 DIAGNOSIS — J45.50 SEVERE PERSISTENT ASTHMA WITHOUT COMPLICATION: ICD-10-CM

## 2025-08-06 DIAGNOSIS — U09.9 POST-COVID SYNDROME: ICD-10-CM

## 2025-08-06 DIAGNOSIS — G47.33 OSA (OBSTRUCTIVE SLEEP APNEA): Primary | ICD-10-CM

## 2025-08-06 PROBLEM — J45.909 SEVERE ASTHMA: Status: RESOLVED | Noted: 2025-06-20 | Resolved: 2025-08-06

## 2025-08-06 PROBLEM — S83.241A OTHER TEAR OF MEDIAL MENISCUS, CURRENT INJURY, RIGHT KNEE, INITIAL ENCOUNTER: Status: RESOLVED | Noted: 2023-11-08 | Resolved: 2025-08-06

## 2025-08-06 PROCEDURE — 99214 OFFICE O/P EST MOD 30 MIN: CPT | Performed by: INTERNAL MEDICINE

## 2025-08-06 RX ORDER — LEVALBUTEROL TARTRATE 45 UG/1
1-2 AEROSOL, METERED ORAL EVERY 4 HOURS PRN
Qty: 45 G | Refills: 3 | Status: SHIPPED | OUTPATIENT
Start: 2025-08-06

## (undated) DEVICE — NEEDLE 18 G X 1 1/2 SAFETY

## (undated) DEVICE — BLADE SHAVER EXCALIBUR 4MM 13CM COOLCUT

## (undated) DEVICE — PENCIL ELECTROSURG E-Z CLEAN -0035H

## (undated) DEVICE — GAUZE SPONGES,16 PLY: Brand: CURITY

## (undated) DEVICE — ARTHROSCOPY FLOOR MAT

## (undated) DEVICE — TUBING ARTHROSCOPY REDUCE PATIENT

## (undated) DEVICE — IMPERVIOUS STOCKINETTE: Brand: DEROYAL

## (undated) DEVICE — SYRINGE 5ML LL

## (undated) DEVICE — 3M™ IOBAN™ 2 ANTIMICROBIAL INCISE DRAPE 6640EZ: Brand: IOBAN™ 2

## (undated) DEVICE — CHLORAPREP HI-LITE 26ML ORANGE

## (undated) DEVICE — BETHLEHEM UNIVERSAL  ARTHRO PK: Brand: CARDINAL HEALTH

## (undated) DEVICE — ACE WRAP 6 IN UNSTERILE

## (undated) DEVICE — SUT MONOCRYL 4-0 PS-2 27 IN Y426H

## (undated) DEVICE — SYRINGE 30ML LL

## (undated) DEVICE — LIGHT GLOVE GREEN

## (undated) DEVICE — GLOVE INDICATOR PI UNDERGLOVE SZ 8 BLUE

## (undated) DEVICE — 3M™ STERI-STRIP™ REINFORCED ADHESIVE SKIN CLOSURES, R1547, 1/2 IN X 4 IN (12 MM X 100 MM), 6 STRIPS/ENVELOPE: Brand: 3M™ STERI-STRIP™

## (undated) DEVICE — TIBURON EXTREMITY SHEET: Brand: CONVERTORS

## (undated) DEVICE — DECANTER: Brand: UNBRANDED

## (undated) DEVICE — OCCLUSIVE GAUZE STRIP,3% BISMUTH TRIBROMOPHENATE IN PETROLATUM BLEND: Brand: XEROFORM

## (undated) DEVICE — INTENDED FOR TISSUE SEPARATION, AND OTHER PROCEDURES THAT REQUIRE A SHARP SURGICAL BLADE TO PUNCTURE OR CUT.: Brand: BARD-PARKER ® CARBON RIB-BACK BLADES

## (undated) DEVICE — GLOVE SRG BIOGEL 8

## (undated) DEVICE — CUFF TOURNIQUET 30 X 4 IN QUICK CONNECT DISP 1BLA

## (undated) DEVICE — STIRRUP STRAP ADULT DISP

## (undated) DEVICE — ABDOMINAL PAD: Brand: DERMACEA

## (undated) DEVICE — DRAPE SHEET THREE QUARTER